# Patient Record
Sex: FEMALE | Race: WHITE | Employment: OTHER | URBAN - METROPOLITAN AREA
[De-identification: names, ages, dates, MRNs, and addresses within clinical notes are randomized per-mention and may not be internally consistent; named-entity substitution may affect disease eponyms.]

---

## 2017-01-03 ENCOUNTER — HOSPITAL ENCOUNTER (INPATIENT)
Facility: HOSPITAL | Age: 67
LOS: 4 days | Discharge: HOME HEALTH | DRG: 191 | End: 2017-01-07
Attending: INTERNAL MEDICINE | Admitting: INTERNAL MEDICINE
Payer: MEDICARE

## 2017-01-03 LAB
BASOPHILS # BLD AUTO: 0.1 X10(3) UL (ref 0–0.1)
BASOPHILS NFR BLD AUTO: 0.9 %
BUN BLD-MCNC: 18 MG/DL (ref 8–20)
CALCIUM BLD-MCNC: 10 MG/DL (ref 8.3–10.3)
CHLORIDE: 100 MMOL/L (ref 101–111)
CO2: 30 MMOL/L (ref 22–32)
CREAT BLD-MCNC: 1.07 MG/DL (ref 0.55–1.02)
EOSINOPHIL # BLD AUTO: 0.14 X10(3) UL (ref 0–0.3)
EOSINOPHIL NFR BLD AUTO: 1.2 %
ERYTHROCYTE [DISTWIDTH] IN BLOOD BY AUTOMATED COUNT: 15.6 % (ref 11.5–16)
GLUCOSE BLD-MCNC: 90 MG/DL (ref 70–99)
HAV IGM SER QL: 1.8 MG/DL (ref 1.7–3)
HCT VFR BLD AUTO: 42.1 % (ref 34–50)
HGB BLD-MCNC: 13 G/DL (ref 12–16)
IMMATURE GRANULOCYTE COUNT: 0.17 X10(3) UL (ref 0–1)
IMMATURE GRANULOCYTE RATIO %: 1.5 %
LYMPHOCYTES # BLD AUTO: 1.84 X10(3) UL (ref 0.9–4)
LYMPHOCYTES NFR BLD AUTO: 15.8 %
MCH RBC QN AUTO: 25.3 PG (ref 27–33.2)
MCHC RBC AUTO-ENTMCNC: 30.9 G/DL (ref 31–37)
MCV RBC AUTO: 82.1 FL (ref 81–100)
MONOCYTES # BLD AUTO: 1.12 X10(3) UL (ref 0.1–0.6)
MONOCYTES NFR BLD AUTO: 9.6 %
NEUTROPHIL ABS PRELIM: 8.29 X10 (3) UL (ref 1.3–6.7)
NEUTROPHILS # BLD AUTO: 8.29 X10(3) UL (ref 1.3–6.7)
NEUTROPHILS NFR BLD AUTO: 71 %
PLATELET # BLD AUTO: 314 10(3)UL (ref 150–450)
POTASSIUM SERPL-SCNC: 4.1 MMOL/L (ref 3.6–5.1)
RBC # BLD AUTO: 5.13 X10(6)UL (ref 3.8–5.1)
RED CELL DISTRIBUTION WIDTH-SD: 46.3 FL (ref 35.1–46.3)
SODIUM SERPL-SCNC: 136 MMOL/L (ref 136–144)
WBC # BLD AUTO: 11.7 X10(3) UL (ref 4–13)

## 2017-01-03 PROCEDURE — 83735 ASSAY OF MAGNESIUM: CPT | Performed by: HOSPITALIST

## 2017-01-03 PROCEDURE — 80048 BASIC METABOLIC PNL TOTAL CA: CPT | Performed by: HOSPITALIST

## 2017-01-03 PROCEDURE — 87633 RESP VIRUS 12-25 TARGETS: CPT | Performed by: HOSPITALIST

## 2017-01-03 PROCEDURE — 94640 AIRWAY INHALATION TREATMENT: CPT

## 2017-01-03 PROCEDURE — 85025 COMPLETE CBC W/AUTO DIFF WBC: CPT | Performed by: HOSPITALIST

## 2017-01-03 PROCEDURE — 87798 DETECT AGENT NOS DNA AMP: CPT | Performed by: HOSPITALIST

## 2017-01-03 PROCEDURE — 87486 CHLMYD PNEUM DNA AMP PROBE: CPT | Performed by: HOSPITALIST

## 2017-01-03 PROCEDURE — 87581 M.PNEUMON DNA AMP PROBE: CPT | Performed by: HOSPITALIST

## 2017-01-03 PROCEDURE — 87999 UNLISTED MICROBIOLOGY PX: CPT

## 2017-01-03 PROCEDURE — 94660 CPAP INITIATION&MGMT: CPT

## 2017-01-03 RX ORDER — GLIMEPIRIDE 4 MG/1
4 TABLET ORAL
COMMUNITY
End: 2017-05-02

## 2017-01-03 RX ORDER — PRAVASTATIN SODIUM 20 MG
20 TABLET ORAL NIGHTLY
Status: DISCONTINUED | OUTPATIENT
Start: 2017-01-03 | End: 2017-01-07

## 2017-01-03 RX ORDER — FUROSEMIDE 40 MG/1
40 TABLET ORAL
Status: DISCONTINUED | OUTPATIENT
Start: 2017-01-03 | End: 2017-01-07

## 2017-01-03 RX ORDER — CYCLOBENZAPRINE HCL 5 MG
5 TABLET ORAL 3 TIMES DAILY
Status: DISCONTINUED | OUTPATIENT
Start: 2017-01-03 | End: 2017-01-07

## 2017-01-03 RX ORDER — FAMOTIDINE 20 MG/1
20 TABLET ORAL DAILY
Status: DISCONTINUED | OUTPATIENT
Start: 2017-01-04 | End: 2017-01-07

## 2017-01-03 RX ORDER — POLYETHYLENE GLYCOL 3350 17 G/17G
17 POWDER, FOR SOLUTION ORAL DAILY PRN
Status: DISCONTINUED | OUTPATIENT
Start: 2017-01-03 | End: 2017-01-07

## 2017-01-03 RX ORDER — ATENOLOL 25 MG/1
25 TABLET ORAL DAILY
Status: DISCONTINUED | OUTPATIENT
Start: 2017-01-04 | End: 2017-01-07

## 2017-01-03 RX ORDER — TRAZODONE HYDROCHLORIDE 50 MG/1
150 TABLET ORAL NIGHTLY
Status: DISCONTINUED | OUTPATIENT
Start: 2017-01-03 | End: 2017-01-07

## 2017-01-03 RX ORDER — ONDANSETRON 2 MG/ML
4 INJECTION INTRAMUSCULAR; INTRAVENOUS EVERY 6 HOURS PRN
Status: DISCONTINUED | OUTPATIENT
Start: 2017-01-03 | End: 2017-01-07

## 2017-01-03 RX ORDER — LISINOPRIL 5 MG/1
5 TABLET ORAL DAILY
COMMUNITY
End: 2017-12-19

## 2017-01-03 RX ORDER — MIRTAZAPINE 15 MG/1
15 TABLET, FILM COATED ORAL NIGHTLY
Status: DISCONTINUED | OUTPATIENT
Start: 2017-01-03 | End: 2017-01-07

## 2017-01-03 RX ORDER — LISINOPRIL 5 MG/1
5 TABLET ORAL DAILY
Status: DISCONTINUED | OUTPATIENT
Start: 2017-01-04 | End: 2017-01-07

## 2017-01-03 RX ORDER — ACETAMINOPHEN 325 MG/1
650 TABLET ORAL EVERY 6 HOURS PRN
Status: DISCONTINUED | OUTPATIENT
Start: 2017-01-03 | End: 2017-01-07

## 2017-01-03 RX ORDER — IPRATROPIUM BROMIDE AND ALBUTEROL SULFATE 2.5; .5 MG/3ML; MG/3ML
3 SOLUTION RESPIRATORY (INHALATION)
Status: DISCONTINUED | OUTPATIENT
Start: 2017-01-03 | End: 2017-01-05

## 2017-01-03 RX ORDER — FLUTICASONE PROPIONATE 50 MCG
2 SPRAY, SUSPENSION (ML) NASAL DAILY
Status: DISCONTINUED | OUTPATIENT
Start: 2017-01-04 | End: 2017-01-07

## 2017-01-03 RX ORDER — IPRATROPIUM BROMIDE 21 UG/1
2 SPRAY, METERED NASAL 3 TIMES DAILY PRN
Status: DISCONTINUED | OUTPATIENT
Start: 2017-01-03 | End: 2017-01-07

## 2017-01-03 RX ORDER — DULOXETIN HYDROCHLORIDE 30 MG/1
60 CAPSULE, DELAYED RELEASE ORAL DAILY
Status: DISCONTINUED | OUTPATIENT
Start: 2017-01-04 | End: 2017-01-07

## 2017-01-04 ENCOUNTER — APPOINTMENT (OUTPATIENT)
Dept: GENERAL RADIOLOGY | Facility: HOSPITAL | Age: 67
DRG: 191 | End: 2017-01-04
Attending: HOSPITALIST
Payer: MEDICARE

## 2017-01-04 LAB
ADENOVIRUS PCR:: NEGATIVE
B PERT DNA SPEC QL NAA+PROBE: NEGATIVE
BASOPHILS # BLD AUTO: 0.11 X10(3) UL (ref 0–0.1)
BASOPHILS NFR BLD AUTO: 1.1 %
BUN BLD-MCNC: 20 MG/DL (ref 8–20)
C PNEUM DNA SPEC QL NAA+PROBE: NEGATIVE
CALCIUM BLD-MCNC: 9.7 MG/DL (ref 8.3–10.3)
CHLORIDE: 100 MMOL/L (ref 101–111)
CO2: 33 MMOL/L (ref 22–32)
CORONAVIRUS 229E PCR:: POSITIVE
CORONAVIRUS HKU1 PCR:: NEGATIVE
CORONAVIRUS NL63 PCR:: NEGATIVE
CORONAVIRUS OC43 PCR:: NEGATIVE
CREAT BLD-MCNC: 0.95 MG/DL (ref 0.55–1.02)
EOSINOPHIL # BLD AUTO: 0.21 X10(3) UL (ref 0–0.3)
EOSINOPHIL NFR BLD AUTO: 2.2 %
ERYTHROCYTE [DISTWIDTH] IN BLOOD BY AUTOMATED COUNT: 15.6 % (ref 11.5–16)
FLUAV H1 2009 PAND RNA SPEC QL NAA+PROBE: NEGATIVE
FLUAV H1 RNA SPEC QL NAA+PROBE: NEGATIVE
FLUAV H3 RNA SPEC QL NAA+PROBE: NEGATIVE
FLUAV RNA SPEC QL NAA+PROBE: NEGATIVE
FLUBV RNA SPEC QL NAA+PROBE: NEGATIVE
GLUCOSE BLD-MCNC: 113 MG/DL (ref 65–99)
GLUCOSE BLD-MCNC: 125 MG/DL (ref 70–99)
GLUCOSE BLD-MCNC: 155 MG/DL (ref 65–99)
GLUCOSE BLD-MCNC: 313 MG/DL (ref 65–99)
HAV IGM SER QL: 2.1 MG/DL (ref 1.7–3)
HCT VFR BLD AUTO: 40.6 % (ref 34–50)
HGB BLD-MCNC: 12.4 G/DL (ref 12–16)
IMMATURE GRANULOCYTE COUNT: 0.2 X10(3) UL (ref 0–1)
IMMATURE GRANULOCYTE RATIO %: 2.1 %
LYMPHOCYTES # BLD AUTO: 1.49 X10(3) UL (ref 0.9–4)
LYMPHOCYTES NFR BLD AUTO: 15.5 %
MCH RBC QN AUTO: 25.3 PG (ref 27–33.2)
MCHC RBC AUTO-ENTMCNC: 30.5 G/DL (ref 31–37)
MCV RBC AUTO: 82.7 FL (ref 81–100)
METAPNEUMOVIRUS PCR:: NEGATIVE
MONOCYTES # BLD AUTO: 0.91 X10(3) UL (ref 0.1–0.6)
MONOCYTES NFR BLD AUTO: 9.4 %
MYCOPLASMA PNEUMONIA PCR:: NEGATIVE
NEUTROPHIL ABS PRELIM: 6.72 X10 (3) UL (ref 1.3–6.7)
NEUTROPHILS # BLD AUTO: 6.72 X10(3) UL (ref 1.3–6.7)
NEUTROPHILS NFR BLD AUTO: 69.7 %
PARAINFLUENZA 1 PCR:: NEGATIVE
PARAINFLUENZA 2 PCR:: NEGATIVE
PARAINFLUENZA 3 PCR:: NEGATIVE
PARAINFLUENZA 4 PCR:: NEGATIVE
PLATELET # BLD AUTO: 284 10(3)UL (ref 150–450)
POTASSIUM SERPL-SCNC: 3.8 MMOL/L (ref 3.6–5.1)
RBC # BLD AUTO: 4.91 X10(6)UL (ref 3.8–5.1)
RED CELL DISTRIBUTION WIDTH-SD: 47.2 FL (ref 35.1–46.3)
RHINOVIRUS/ENTERO PCR:: NEGATIVE
RSV RNA SPEC QL NAA+PROBE: NEGATIVE
SODIUM SERPL-SCNC: 138 MMOL/L (ref 136–144)
WBC # BLD AUTO: 9.6 X10(3) UL (ref 4–13)

## 2017-01-04 PROCEDURE — 85025 COMPLETE CBC W/AUTO DIFF WBC: CPT | Performed by: HOSPITALIST

## 2017-01-04 PROCEDURE — 94640 AIRWAY INHALATION TREATMENT: CPT

## 2017-01-04 PROCEDURE — 83735 ASSAY OF MAGNESIUM: CPT | Performed by: HOSPITALIST

## 2017-01-04 PROCEDURE — 71010 XR CHEST AP PORTABLE  (CPT=71010): CPT

## 2017-01-04 PROCEDURE — 80048 BASIC METABOLIC PNL TOTAL CA: CPT | Performed by: HOSPITALIST

## 2017-01-04 PROCEDURE — 82962 GLUCOSE BLOOD TEST: CPT

## 2017-01-04 RX ORDER — ENOXAPARIN SODIUM 100 MG/ML
0.5 INJECTION SUBCUTANEOUS DAILY
Status: DISCONTINUED | OUTPATIENT
Start: 2017-01-04 | End: 2017-01-07

## 2017-01-04 RX ORDER — DEXTROSE MONOHYDRATE 25 G/50ML
50 INJECTION, SOLUTION INTRAVENOUS
Status: DISCONTINUED | OUTPATIENT
Start: 2017-01-04 | End: 2017-01-07

## 2017-01-04 RX ORDER — METHYLPREDNISOLONE SODIUM SUCCINATE 40 MG/ML
40 INJECTION, POWDER, LYOPHILIZED, FOR SOLUTION INTRAMUSCULAR; INTRAVENOUS EVERY 8 HOURS
Status: COMPLETED | OUTPATIENT
Start: 2017-01-04 | End: 2017-01-05

## 2017-01-04 NOTE — PROGRESS NOTES
[facility] Progress Note:  Anticoagulation Weight Dose Adjustment for enoxaparin ([place] Perfect)    Saima Tinoco is a 77year old female who has been prescribed enoxaparin (LOVENOX) for VTE prophylaxis.       Estimated Creatinine Clearance: 48.2 mL/min (b

## 2017-01-04 NOTE — PAYOR COMM NOTE
Attending Physician: Alphonso Crook DO    Review Type: ADMISSION   Reviewer: Be HOLLINGSWORTH       Date: January 4, 2017 - 2:09 PM  Payor: Tracey Edwards MEDICARE ADV HMO  Authorization Number: [id number]/A  Admit date: 1/3/2017  5:51 PM   Admitted from Emergency Dept. Ronan Peterson Given 3 mL Nebulization [place], [place]    1/4/2017 8885 Given 3 mL Nebulization [place]    1/3/2017 2000 Given 3 mL Nebulization Desire Mclean          RESULTS LAST 24HRS:  Labs Reviewed   BASIC METABOLIC PANEL (8) - Abnormal; Notable for the fo    3. Mo:   - continue pap therapy  4. Dispo: full code. Potential d/c in 1-2 days.

## 2017-01-04 NOTE — RESPIRATORY THERAPY NOTE
ASHLEY Equipment Usage Summary :            Set Mode :AFLEX            Usage in Hours:6;49            90% Pressure (EPAP) : 6.5             90% Insp Pressure (IPAP):            AHI : 4.8            Supplemental Oxygen :   3     LPM

## 2017-01-04 NOTE — PLAN OF CARE
RESPIRATORY - ADULT    • Achieves optimal ventilation and oxygenation Progressing        Pt is alert and oriented, lung sounds are wheezy, pt is positive for corona virus from today, will inform the Dr. She remains on con and droplet isolation.  Slight scarlet

## 2017-01-04 NOTE — PLAN OF CARE
Patient/Family Goals    • Patient/Family Long Term Goal Progressing    • Patient/Family Short Term Goal Progressing        RESPIRATORY - ADULT    • Achieves optimal ventilation and oxygenation Progressing          Patient is alert and oriented.   No complai

## 2017-01-04 NOTE — CONSULTS
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 SPI DX/THER Lucina Almonte  4/22/2015    Comment Procedure: ;  Surgeon: Trupti Kitchen MD;  Location: [address] [place] [address] Grandfather      pancreatic   • Other[other] [OTHER] Mother      Sarcoid   • Hypertension Mother    • Lipids Mother    • Obesity Mother    • Psychiatric Mother         Home Medications:    Outpatient Prescriptions Marked as Taking for the 1/3/17 encounter Take 500 mg by mouth 2 (two) times daily with meals. Disp:  Rfl:    Tiotropium Bromide Monohydrate 18 MCG Inhalation Cap Inhale 1 capsule (18 mcg total) into the lungs daily.  Disp: 90 capsule Rfl: 3   Ketoconazole-Hydrocortisone 2 & 1 % (CREAM) External Ki 01/04/17  0040 01/04/17  0500 01/04/17  0843 01/04/17  1334   BP: 130/58 150/75 136/66 110/61   Pulse: 60 67 81 73   Temp: 98 °F (36.7 °C) 98.2 °F (36.8 °C) 97.5 °F (36.4 °C) 97.9 °F (36.6 °C)   TempSrc: Axillary Oral Oral Oral   Resp: 16 16 18 18   Weight (mg/dL)   Date Value   08/14/2015 0.88   01/09/2014 1.14*   09/04/2013 0.89   ----------  CREATININE (mg/dL)   Date Value   01/04/2017 0.95   01/03/2017 1.07*   11/03/2016 0.70   08/12/2016 0.86   02/02/2016 1.00   06/24/2015 0.91   ----------]    No resul

## 2017-01-04 NOTE — H&P
[facility] Hospitalist History and Physical      No chief complaint on file.        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 obese   Head:  Normocephalic, without obvious abnormality, atraumatic. Eyes:  Sclera anicteric, No conjunctival pallor, EOMs intact.     Nose: Nares normal,  Mucosa normal    Throat: Lips, mucosa, and tongue normal Assessment/Plan:     Patient is a 77year old female with PMH sig for HTN, obesity, HL, GERD, depression/anxiety, ASHLEY, copd on chronic O2 who presents to PCP office with SOB.     #COPD exacerbation - due to coronavirus  -chest xr reviewed, no

## 2017-01-05 LAB
GLUCOSE BLD-MCNC: 160 MG/DL (ref 65–99)
GLUCOSE BLD-MCNC: 173 MG/DL (ref 65–99)
GLUCOSE BLD-MCNC: 178 MG/DL (ref 65–99)
GLUCOSE BLD-MCNC: 268 MG/DL (ref 65–99)

## 2017-01-05 PROCEDURE — 82962 GLUCOSE BLOOD TEST: CPT

## 2017-01-05 PROCEDURE — 94640 AIRWAY INHALATION TREATMENT: CPT

## 2017-01-05 PROCEDURE — 94664 DEMO&/EVAL PT USE INHALER: CPT

## 2017-01-05 RX ORDER — GUAIFENESIN 600 MG
600 TABLET, EXTENDED RELEASE 12 HR ORAL 2 TIMES DAILY
Status: DISCONTINUED | OUTPATIENT
Start: 2017-01-05 | End: 2017-01-06

## 2017-01-05 RX ORDER — IPRATROPIUM BROMIDE AND ALBUTEROL SULFATE 2.5; .5 MG/3ML; MG/3ML
3 SOLUTION RESPIRATORY (INHALATION)
Status: DISCONTINUED | OUTPATIENT
Start: 2017-01-05 | End: 2017-01-07

## 2017-01-05 RX ORDER — PREDNISONE 20 MG/1
40 TABLET ORAL
Status: DISCONTINUED | OUTPATIENT
Start: 2017-01-06 | End: 2017-01-07

## 2017-01-05 RX ORDER — FUROSEMIDE 10 MG/ML
40 INJECTION INTRAMUSCULAR; INTRAVENOUS ONCE
Status: COMPLETED | OUTPATIENT
Start: 2017-01-05 | End: 2017-01-05

## 2017-01-05 NOTE — CM/SW NOTE
01/05/17 1600   CM/SW Referral Data   Referral Source    Reason for Referral Discharge planning   Informant Patient   Pertinent Medical Hx   Primary Care Physician Name [facility]  (Mason IV)   Patient Info   Patient's Mental Status Alert;O issues resolved through [employer] customer service. CM will obtain new order for CPAP mask with nasal pillows, head and chin straps. CM will ask the [place] liaison to follow up with pt. CM contact info left with pt.   Will follow up in am.    Francisco Javier Maravilla

## 2017-01-05 NOTE — PROGRESS NOTES
[facility] Hospitalist Progress Note                                                                   Prabhu 45  3/7/1950    SUBJECTIVE: pt is doing better. Still coughing, but less sob. spray Nasal Daily   • furosemide  40 mg Oral BID (Diuretic)   • Levothyroxine Sodium  137 mcg Oral Before breakfast   • lisinopril  5 mg Oral Daily   • Cyclobenzaprine HCl  5 mg Oral TID   • mirtazapine  15 mg Oral Nightly   • famoTIDine  20 mg Oral Daily

## 2017-01-05 NOTE — RESPIRATORY THERAPY NOTE
ASHLEY Equipment Usage Summary :            Set Mode :AFLEX            Usage in Hours:1;58            90% Pressure (EPAP) :6.5              90% Insp Pressure (IPAP):            AHI : 4            Supplemental Oxygen :    3    LPM

## 2017-01-05 NOTE — PROGRESS NOTES
Pulmonary Progress Note        NAME: Gladis Hess - ROOM: 546/817-W - MRN: [id number] - Age: 77year old - : 3/7/1950        SUBJECTIVE: No events overnight, still feels exhausted, less wheezing    OBJECTIVE:   17  0445 17  0720  01/04/17  0651   WBC 11.7 9.6   HGB 13.0 12.4   MCV 82.1 82.7   .0 284.0         Recent Labs   01/03/17 1917 01/04/17  0651    138   K 4.1 3.8   * 100*   CO2 30.0 33.0*   BUN 18 20   CA 10.0 9.7   MG 1.8 2.1       No results for input(s outpatient  -needs cost effective options for d/c  3. Viral bronchitis: RVP with coronavirus  - hold on abx  - pulm toilet.    4. Mo:   - continue pap therapy  5. Dispo: full code. Potential d/c tomorrow if pt feels better.       [address] [facility]

## 2017-01-05 NOTE — PLAN OF CARE
Diabetes/Glucose Control    • Glucose maintained within prescribed range Progressing        Patient/Family Goals    • Patient/Family Long Term Goal Progressing    • Patient/Family Short Term Goal Progressing        RESPIRATORY - ADULT    • Achieves optimal 3 L NC, 2 L NC prn baseline at home; droplet precautions maintained, + for coronavirus; on tele, NSR; up self to washroom with steady gait; receiving IV solumedrol, will switch to PO prednisone tomorrow AM, possible d/c tomorrow if feeling better per MD; p

## 2017-01-06 PROBLEM — Z99.89 OSA ON CPAP: Status: ACTIVE | Noted: 2017-01-06

## 2017-01-06 PROBLEM — G47.33 OSA ON CPAP: Status: ACTIVE | Noted: 2017-01-06

## 2017-01-06 LAB
GLUCOSE BLD-MCNC: 157 MG/DL (ref 65–99)
GLUCOSE BLD-MCNC: 175 MG/DL (ref 65–99)
GLUCOSE BLD-MCNC: 189 MG/DL (ref 65–99)
GLUCOSE BLD-MCNC: 221 MG/DL (ref 65–99)

## 2017-01-06 PROCEDURE — 82962 GLUCOSE BLOOD TEST: CPT

## 2017-01-06 PROCEDURE — 94664 DEMO&/EVAL PT USE INHALER: CPT

## 2017-01-06 PROCEDURE — 94640 AIRWAY INHALATION TREATMENT: CPT

## 2017-01-06 RX ORDER — SENNOSIDES 8.6 MG
8.6 TABLET ORAL 2 TIMES DAILY PRN
Status: DISCONTINUED | OUTPATIENT
Start: 2017-01-06 | End: 2017-01-07

## 2017-01-06 RX ORDER — DOCUSATE SODIUM 100 MG/1
100 CAPSULE, LIQUID FILLED ORAL 2 TIMES DAILY PRN
Status: DISCONTINUED | OUTPATIENT
Start: 2017-01-06 | End: 2017-01-07

## 2017-01-06 NOTE — PROGRESS NOTES
[facility] Hospitalist Progress Note                                                                   Prabhu 45  3/7/1950    Cc: f/u dyspnea    SUBJECTIVE: pt is doing better.  Complains a Daily   • Fluticasone Propionate  2 spray Nasal Daily   • furosemide  40 mg Oral BID (Diuretic)   • Levothyroxine Sodium  137 mcg Oral Before breakfast   • lisinopril  5 mg Oral Daily   • Cyclobenzaprine HCl  5 mg Oral TID   • mirtazapine  15 mg Oral Night

## 2017-01-06 NOTE — PROGRESS NOTES
Assumed care of pt at 2230, see flowsheets. Pt is AOX4. Pt c/o neck pain, PRN tylenol adminisitered with hot pack, with relief, wctm. Pt maintaining O2 saturation >92% on 3L nasal cannula, pt refusing CPAP, . Pt on tele, NSR. Pt up ad ban, BRP.  PIV sali

## 2017-01-06 NOTE — PROGRESS NOTES
Pulmonary Progress Note        NAME: Paige Boxer - ROOM: 695/517-U - MRN: [id number] - Age: 77year old - : 3/7/1950        SUBJECTIVE: No events overnight, doesn't like the mucinex    OBJECTIVE:   17  0029 17  0515 17  0700  WBC 11.7 9.6   HGB 13.0 12.4   MCV 82.1 82.7   .0 284.0         Recent Labs   01/03/17  1917 01/04/17  0651    138   K 4.1 3.8   * 100*   CO2 30.0 33.0*   BUN 18 20   CA 10.0 9.7   MG 1.8 2.1       No results for input(s): ALT, AST, AL and monitor  -needs cost effective options for d/c  3. Viral bronchitis: RVP with coronavirus  - hold on abx  - pulm toilet.    4. Mo:   - continue pap therapy  5.  Dispo: full code.  -plan to d/c tomorrow barring clinical worsening      Tamara WHITT

## 2017-01-06 NOTE — RESPIRATORY THERAPY NOTE
Set Mode :                          Usage in Hours:                           90% Exp. Pressure(EPAP):                          90% Insp. Pressure(IPAP):                          AHI :                          Supplement

## 2017-01-06 NOTE — PAYOR COMM NOTE
Attending Physician: Cynthia Friend MD    Review Type: CONTINUED STAY  Reviewer: Dm HOLLINGSWORTH     Date: January 6, 2017 - 12:24 PM  Pay[facility]  Authorization Number: [id number]  Admit date: 1/3/2017  5:51 PM        REVIEWER COMMENTS Dose Route User    1/6/2017 0700 Given 3 mL Nebulization Cristina Weeks RCP    1/5/2017 1908 Given 3 mL Nebulization Katrin Nila    1/5/2017 1310 Given 3 mL Nebulization Mi Patel, SYLVIA      lisinopril (PRINIVIL,ZESTRIL) tab 5 mg     Date Action Dose R

## 2017-01-06 NOTE — PROGRESS NOTES
Multidisciplinary Discharge Rounds held 1/6/2017. Treatment team members present today include , , Charge Nurse,  Nurse, RT, PT and Pharmacy caring for [employer].      Other care providers present:    Patient Active Prob

## 2017-01-06 NOTE — PROGRESS NOTES
Sp02 percent ambulation on room air. ..  85%    Sp02 percent ambulation on 02 94% on 2 liters per minute

## 2017-01-06 NOTE — CM/SW NOTE
Order for new CPAP mask and supplies along with pt request for larger portable O2 tanks sent to [address] [place] [address]. Spoke to their liaison, Charli Pollock, who will follow up and request tech be sent to the home. Liaison contact number given to pt.   Pt states [address] [place] [address] already c

## 2017-01-07 VITALS
HEART RATE: 65 BPM | TEMPERATURE: 98 F | DIASTOLIC BLOOD PRESSURE: 70 MMHG | SYSTOLIC BLOOD PRESSURE: 134 MMHG | OXYGEN SATURATION: 97 % | RESPIRATION RATE: 18 BRPM | BODY MASS INDEX: 52 KG/M2 | WEIGHT: 292.13 LBS

## 2017-01-07 LAB
GLUCOSE BLD-MCNC: 163 MG/DL (ref 65–99)
GLUCOSE BLD-MCNC: 99 MG/DL (ref 65–99)

## 2017-01-07 PROCEDURE — 82962 GLUCOSE BLOOD TEST: CPT

## 2017-01-07 PROCEDURE — 94640 AIRWAY INHALATION TREATMENT: CPT

## 2017-01-07 RX ORDER — PREDNISONE 20 MG/1
40 TABLET ORAL
Qty: 6 TABLET | Refills: 0 | Status: SHIPPED | OUTPATIENT
Start: 2017-01-08 | End: 2017-01-07

## 2017-01-07 RX ORDER — PREDNISONE 10 MG/1
TABLET ORAL
Qty: 26 TABLET | Refills: 0 | Status: ON HOLD | OUTPATIENT
Start: 2017-01-07 | End: 2017-06-27

## 2017-01-07 RX ORDER — BENZONATATE 100 MG/1
100 CAPSULE ORAL 3 TIMES DAILY PRN
Qty: 60 CAPSULE | Refills: 0 | Status: SHIPPED | OUTPATIENT
Start: 2017-01-07 | End: 2017-07-12 | Stop reason: ALTCHOICE

## 2017-01-07 NOTE — PROGRESS NOTES
Pulmonary Progress Note        NAME: Braulio Mccloud - ROOM: [address] [place] [address] - MRN: [id number] - Age: 77year old - : 3/7/1950        SUBJECTIVE: No events overnight    OBJECTIVE:   17  1946 17  0012 17  0424 17  0848   BP: 127/58 11 .0 284.0         Recent Labs   01/03/17 1917 01/04/17  0651    138   K 4.1 3.8   * 100*   CO2 30.0 33.0*   BUN 18 20   CA 10.0 9.7   MG 1.8 2.1       No results for input(s): ALT, AST, ALB, AMYLASE, LIPASE, LDH in the last 72 hours. d/c  3. Viral bronchitis: RVP with coronavirus  - hold on abx  - pulm toilet.    4. Mo:   - continue pap therapy  5. Dispo: full code.   -ok for home  -f/u in clinic in the next week or two      [address] [facility] Pulmonary and [address]

## 2017-01-07 NOTE — PLAN OF CARE
Diabetes/Glucose Control    • Glucose maintained within prescribed range Adequate for Discharge        Patient/Family Goals    • Patient/Family Long Term Goal Adequate for Discharge    • Patient/Family Short Term Goal Adequate for Discharge        RESPIRAT

## 2017-01-07 NOTE — RESPIRATORY THERAPY NOTE
ASHLEY Equipment Usage Summary :            Set Mode :AFLEX            Usage in Hours:1;23            90% Pressure (EPAP) : 6.1             90% Insp Pressure (IPAP):            AHI : 6.5            Supplemental Oxygen :    4    LPM

## 2017-01-07 NOTE — CM/SW NOTE
sw notified that Jacob Lint is too expensive for patient. sw looked into mfr assistance programs and there is one program however pt is not eligible since she is a medicare recipient.  anuj notified RN who will discuss with MD.     [address] [facility]

## 2017-01-07 NOTE — PLAN OF CARE
Diabetes/Glucose Control    • Glucose maintained within prescribed range Progressing        Patient/Family Goals    • Patient/Family Long Term Goal Progressing    • Patient/Family Short Term Goal Progressing        Pt is A&Ox4. 2-4L O2. WALKER. Cough.  Refuses

## 2017-01-09 NOTE — CM/SW NOTE
Patient discharged on 01/07/2017 as previously planned.        01/09/17 0900   Discharge disposition   Discharged to: Home-Health   Name of Lee Ann ProcDutch Thakkar 1 services after discharge DME   E provider [facility]   Discharge transpor

## 2017-01-09 NOTE — DISCHARGE SUMMARY
General Medicine Discharge Summary     Patient ID:  Gladis Hess  77year old  3/7/1950    Admit date: 1/3/2017    Discharge date and time: 1/7/2017  5:25 PM     Attending Physician: Leonides Posadas MD office with SOB. #COPD exacerbation, acute on chronic- due to coronavirus  -chest xr reviewed, no evidence of PNA  -cont Nebs, inhalers.  Transition to PO steroids  -Pulm consulted, appreciate  -Wean o2 as able, chronically on oxygen    #HTN- cont bp med arrives    DULoxetine HCl 30 MG Oral Cap DR Particles  Take 2 capsules (60 mg total) by mouth daily. , Normal, Disp-90 capsule, R-3    Fluticasone Furoate-Vilanterol 100-25 MCG/INH Inhalation Aerosol Powder, Breath Activated  Inhale 1 puff into the lungs da 01/07/17  0424   BP: 134/70   Pulse: 65   Temp: 97.9 °F (36.6 °C)   Resp: 18       General: no acute distress, alert and oriented x 3  Heart: RRR  Lungs: clear bilaterally, no active wheezing  Abdomen: nontender, nondistended, intact BS  Extremities: +1 ed

## 2017-01-27 ENCOUNTER — HOSPITAL ENCOUNTER (OUTPATIENT)
Dept: GENERAL RADIOLOGY | Facility: HOSPITAL | Age: 67
Discharge: HOME | End: 2017-01-27
Attending: OTOLARYNGOLOGY
Payer: MEDICARE

## 2017-01-27 DIAGNOSIS — R13.10 DYSPHAGIA: ICD-10-CM

## 2017-01-27 PROCEDURE — 74230 X-RAY XM SWLNG FUNCJ C+: CPT

## 2017-01-27 PROCEDURE — 92611 MOTION FLUOROSCOPY/SWALLOW: CPT

## 2017-01-27 NOTE — PROGRESS NOTES
ADULT VIDEOFLUOROSCOPIC SWALLOWING STUDY    Admission Date: 1/27/2017  Evaluation Date: 01/27/2017  Radiologist: Adenike Lay    Dear Erasmo Vega,  This letter is to inform you of Aye Núñez Videofluoroscopic Swallowing Study results and/or plan o 51 RDI 53 SaO2 ko 79 % BIPAP 16/11 with O2 entrained @ 2 l/min   • COPD (chronic obstructive pulmonary disease) (HCC)      02-2L nc prn at home   • Sleep apnea                       Imaging results:        CONCLUSION:      Low lung volumes.  Mild increas aspiration of consistencies / thin, nectar, honey, puree or solid. GOALS  Goal #1 The patient will tolerate regular consistency and thin liquids without overt signs or symptoms of aspiration with 95 % accuracy over 0 session(s).    Goal #2 The patient/fa

## 2017-02-02 PROCEDURE — 84443 ASSAY THYROID STIM HORMONE: CPT | Performed by: INTERNAL MEDICINE

## 2017-02-02 PROCEDURE — 86800 THYROGLOBULIN ANTIBODY: CPT | Performed by: INTERNAL MEDICINE

## 2017-06-24 NOTE — LETTER
[facility] [address], [address]  Consent for Procedure/Sedation    Date: 10/27/2020   Time: 0817      1.  I authorize the performance upon Nicole Tuttle the following:cardiac catheterization, left ventricular cineangiography Signature of person authorized                                     Relationship to  to consent for patient: _________________________ patient: ___________________    Witness: _______________________________ Date: _____________________    Printed: 10/27/202 No

## 2017-06-27 ENCOUNTER — HOSPITAL ENCOUNTER (OUTPATIENT)
Facility: HOSPITAL | Age: 67
Setting detail: OBSERVATION
Discharge: HOME | End: 2017-06-29
Attending: EMERGENCY MEDICINE | Admitting: INTERNAL MEDICINE
Payer: MEDICARE

## 2017-06-27 ENCOUNTER — APPOINTMENT (OUTPATIENT)
Dept: GENERAL RADIOLOGY | Facility: HOSPITAL | Age: 67
End: 2017-06-27
Attending: EMERGENCY MEDICINE
Payer: MEDICARE

## 2017-06-27 ENCOUNTER — APPOINTMENT (OUTPATIENT)
Dept: NUCLEAR MEDICINE | Facility: HOSPITAL | Age: 67
End: 2017-06-27
Attending: EMERGENCY MEDICINE
Payer: MEDICARE

## 2017-06-27 DIAGNOSIS — R07.9 ACUTE CHEST PAIN: Primary | ICD-10-CM

## 2017-06-27 LAB
ALBUMIN SERPL-MCNC: 2.9 G/DL (ref 3.5–4.8)
ALP LIVER SERPL-CCNC: 128 U/L (ref 55–142)
ALT SERPL-CCNC: 17 U/L (ref 14–54)
APTT PPP: 35.1 SECONDS (ref 25–34)
AST SERPL-CCNC: 18 U/L (ref 15–41)
BASOPHILS # BLD AUTO: 0.07 X10(3) UL (ref 0–0.1)
BASOPHILS NFR BLD AUTO: 0.6 %
BILIRUB SERPL-MCNC: 0.4 MG/DL (ref 0.1–2)
BUN BLD-MCNC: 11 MG/DL (ref 8–20)
CALCIUM BLD-MCNC: 9.8 MG/DL (ref 8.3–10.3)
CHLORIDE: 101 MMOL/L (ref 101–111)
CO2: 34 MMOL/L (ref 22–32)
CREAT BLD-MCNC: 0.9 MG/DL (ref 0.55–1.02)
D-DIMER: 1.52 UG/ML FEU (ref 0–0.49)
EOSINOPHIL # BLD AUTO: 0.15 X10(3) UL (ref 0–0.3)
EOSINOPHIL NFR BLD AUTO: 1.3 %
ERYTHROCYTE [DISTWIDTH] IN BLOOD BY AUTOMATED COUNT: 17 % (ref 11.5–16)
EST. AVERAGE GLUCOSE BLD GHB EST-MCNC: 166 MG/DL (ref 68–126)
GLUCOSE BLD-MCNC: 160 MG/DL (ref 65–99)
GLUCOSE BLD-MCNC: 160 MG/DL (ref 70–99)
HBA1C MFR BLD HPLC: 7.4 % (ref ?–5.7)
HCT VFR BLD AUTO: 40 % (ref 34–50)
HGB BLD-MCNC: 11.6 G/DL (ref 12–16)
IMMATURE GRANULOCYTE COUNT: 0.23 X10(3) UL (ref 0–1)
IMMATURE GRANULOCYTE RATIO %: 2 %
INR BLD: 1.03 (ref 0.89–1.11)
LYMPHOCYTES # BLD AUTO: 1.49 X10(3) UL (ref 0.9–4)
LYMPHOCYTES NFR BLD AUTO: 13 %
M PROTEIN MFR SERPL ELPH: 8 G/DL (ref 6.1–8.3)
MCH RBC QN AUTO: 23.4 PG (ref 27–33.2)
MCHC RBC AUTO-ENTMCNC: 29 G/DL (ref 31–37)
MCV RBC AUTO: 80.8 FL (ref 81–100)
MONOCYTES # BLD AUTO: 1.11 X10(3) UL (ref 0.1–0.6)
MONOCYTES NFR BLD AUTO: 9.7 %
NEUTROPHIL ABS PRELIM: 8.4 X10 (3) UL (ref 1.3–6.7)
NEUTROPHILS # BLD AUTO: 8.4 X10(3) UL (ref 1.3–6.7)
NEUTROPHILS NFR BLD AUTO: 73.4 %
PLATELET # BLD AUTO: 342 10(3)UL (ref 150–450)
POTASSIUM SERPL-SCNC: 4 MMOL/L (ref 3.6–5.1)
PROCALCITONIN SERPL-MCNC: <0.11 NG/ML (ref ?–0.11)
PSA SERPL DL<=0.01 NG/ML-MCNC: 13.5 SECONDS (ref 12–14.3)
RBC # BLD AUTO: 4.95 X10(6)UL (ref 3.8–5.1)
RED CELL DISTRIBUTION WIDTH-SD: 49.3 FL (ref 35.1–46.3)
SODIUM SERPL-SCNC: 138 MMOL/L (ref 136–144)
TROPONIN: <0.046 NG/ML (ref ?–0.05)
TROPONIN: <0.046 NG/ML (ref ?–0.05)
TSI SER-ACNC: 3.65 MIU/ML (ref 0.35–5.5)
WBC # BLD AUTO: 11.5 X10(3) UL (ref 4–13)

## 2017-06-27 PROCEDURE — 84484 ASSAY OF TROPONIN QUANT: CPT | Performed by: HOSPITALIST

## 2017-06-27 PROCEDURE — 80053 COMPREHEN METABOLIC PANEL: CPT | Performed by: EMERGENCY MEDICINE

## 2017-06-27 PROCEDURE — 36415 COLL VENOUS BLD VENIPUNCTURE: CPT

## 2017-06-27 PROCEDURE — 85610 PROTHROMBIN TIME: CPT | Performed by: EMERGENCY MEDICINE

## 2017-06-27 PROCEDURE — 85378 FIBRIN DEGRADE SEMIQUANT: CPT | Performed by: EMERGENCY MEDICINE

## 2017-06-27 PROCEDURE — 82962 GLUCOSE BLOOD TEST: CPT

## 2017-06-27 PROCEDURE — 84484 ASSAY OF TROPONIN QUANT: CPT | Performed by: EMERGENCY MEDICINE

## 2017-06-27 PROCEDURE — 84145 PROCALCITONIN (PCT): CPT | Performed by: HOSPITALIST

## 2017-06-27 PROCEDURE — 83036 HEMOGLOBIN GLYCOSYLATED A1C: CPT | Performed by: HOSPITALIST

## 2017-06-27 PROCEDURE — 84443 ASSAY THYROID STIM HORMONE: CPT | Performed by: HOSPITALIST

## 2017-06-27 PROCEDURE — 94640 AIRWAY INHALATION TREATMENT: CPT

## 2017-06-27 PROCEDURE — 71010 XR CHEST AP PORTABLE  (CPT=71010): CPT | Performed by: EMERGENCY MEDICINE

## 2017-06-27 PROCEDURE — 85025 COMPLETE CBC W/AUTO DIFF WBC: CPT | Performed by: EMERGENCY MEDICINE

## 2017-06-27 PROCEDURE — 99285 EMERGENCY DEPT VISIT HI MDM: CPT

## 2017-06-27 PROCEDURE — 93005 ELECTROCARDIOGRAM TRACING: CPT

## 2017-06-27 PROCEDURE — 78582 LUNG VENTILAT&PERFUS IMAGING: CPT | Performed by: EMERGENCY MEDICINE

## 2017-06-27 PROCEDURE — 85730 THROMBOPLASTIN TIME PARTIAL: CPT | Performed by: EMERGENCY MEDICINE

## 2017-06-27 PROCEDURE — 93010 ELECTROCARDIOGRAM REPORT: CPT

## 2017-06-27 RX ORDER — TRAZODONE HYDROCHLORIDE 50 MG/1
50 TABLET ORAL NIGHTLY
Status: DISCONTINUED | OUTPATIENT
Start: 2017-06-27 | End: 2017-06-29

## 2017-06-27 RX ORDER — DEXTROSE MONOHYDRATE 25 G/50ML
50 INJECTION, SOLUTION INTRAVENOUS
Status: DISCONTINUED | OUTPATIENT
Start: 2017-06-27 | End: 2017-06-29

## 2017-06-27 RX ORDER — LISINOPRIL 5 MG/1
5 TABLET ORAL DAILY
Status: DISCONTINUED | OUTPATIENT
Start: 2017-06-28 | End: 2017-06-29

## 2017-06-27 RX ORDER — PANTOPRAZOLE SODIUM 20 MG/1
20 TABLET, DELAYED RELEASE ORAL
Status: DISCONTINUED | OUTPATIENT
Start: 2017-06-28 | End: 2017-06-29

## 2017-06-27 RX ORDER — SODIUM CHLORIDE 9 MG/ML
INJECTION, SOLUTION INTRAVENOUS CONTINUOUS
Status: ACTIVE | OUTPATIENT
Start: 2017-06-27 | End: 2017-06-27

## 2017-06-27 RX ORDER — ALBUTEROL SULFATE 90 UG/1
2 AEROSOL, METERED RESPIRATORY (INHALATION) EVERY 4 HOURS PRN
Status: DISCONTINUED | OUTPATIENT
Start: 2017-06-27 | End: 2017-06-29

## 2017-06-27 RX ORDER — MIRTAZAPINE 15 MG/1
15 TABLET, FILM COATED ORAL NIGHTLY
Status: DISCONTINUED | OUTPATIENT
Start: 2017-06-27 | End: 2017-06-29

## 2017-06-27 RX ORDER — DULOXETIN HYDROCHLORIDE 30 MG/1
60 CAPSULE, DELAYED RELEASE ORAL DAILY
Status: DISCONTINUED | OUTPATIENT
Start: 2017-06-28 | End: 2017-06-29

## 2017-06-27 RX ORDER — ATENOLOL 50 MG/1
50 TABLET ORAL DAILY
Status: DISCONTINUED | OUTPATIENT
Start: 2017-06-28 | End: 2017-06-29

## 2017-06-27 RX ORDER — ASPIRIN 81 MG/1
324 TABLET, CHEWABLE ORAL ONCE
Status: COMPLETED | OUTPATIENT
Start: 2017-06-27 | End: 2017-06-27

## 2017-06-27 RX ORDER — IPRATROPIUM BROMIDE 21 UG/1
2 SPRAY, METERED NASAL 3 TIMES DAILY PRN
Status: DISCONTINUED | OUTPATIENT
Start: 2017-06-27 | End: 2017-06-29

## 2017-06-27 RX ORDER — FLUTICASONE PROPIONATE 50 MCG
2 SPRAY, SUSPENSION (ML) NASAL DAILY
Status: DISCONTINUED | OUTPATIENT
Start: 2017-06-28 | End: 2017-06-29

## 2017-06-27 RX ORDER — IPRATROPIUM BROMIDE AND ALBUTEROL SULFATE 2.5; .5 MG/3ML; MG/3ML
3 SOLUTION RESPIRATORY (INHALATION)
Status: DISCONTINUED | OUTPATIENT
Start: 2017-06-27 | End: 2017-06-29

## 2017-06-27 RX ORDER — PRAVASTATIN SODIUM 20 MG
20 TABLET ORAL NIGHTLY
Status: DISCONTINUED | OUTPATIENT
Start: 2017-06-27 | End: 2017-06-29

## 2017-06-27 RX ORDER — ONDANSETRON 2 MG/ML
4 INJECTION INTRAMUSCULAR; INTRAVENOUS EVERY 4 HOURS PRN
Status: DISCONTINUED | OUTPATIENT
Start: 2017-06-27 | End: 2017-06-29

## 2017-06-27 RX ORDER — CLONAZEPAM 0.5 MG/1
2 TABLET ORAL AS NEEDED
Status: DISCONTINUED | OUTPATIENT
Start: 2017-06-27 | End: 2017-06-28

## 2017-06-27 NOTE — ED PROVIDER NOTES
Patient Seen in: [facility] Emergency Department    History   Patient presents with:  Chest Pain Angina (cardiovascular)    Stated Complaint: cp     HPI    The patient is a 78-year-old female who presents emergency room with a history of chest disco • Problems with swallowing     Due to nodules on thyroid   • Reflux    • Sleep apnea    • Status post thyroidectomy    • Thyroid ca [facility]) 6/27/2013   • Thyroid cancer ([facility] [address]     localized, s/p resection       Past Surgical History:  No date: APPENDECTOMY MANAGEMENT  4/22/2015: M-SEDJOVANNI BY PRABHJOT PHYS PERFANGELG SV 5+ YR      Comment: Procedure: ;  Surgeon: Valentino Nugent MD;                 Location: [address] [place] [address] MANAGEMENT  2008: OTHER      Comment: removed benign tumor lt  abt 1986: OTHER SURGICAL HISTO INCRUSE ELLIPTA 62.5 MCG/INH Inhalation Aerosol Powder, Breath Activated,  INHALE 1 PUFF BY MOUTH DAILY   TraZODone HCl 50 MG Oral Tab,  Take 1 tablet (50 mg total) by mouth nightly.    Fluticasone Propionate 50 MCG/ACT Nasal Suspension,  2 sprays by Nasal total) into the lungs daily. Fluticasone Furoate-Vilanterol (BREO ELLIPTA) 200-25 MCG/INH Inhalation Aerosol Powder, Breath Activated,  Inhale 1 puff into the lungs daily.    Tiotropium Bromide Monohydrate (SPIRIVA RESPIMAT) 1.25 MCG/ACT Inhalation Aero S appearance. HEENT: Head is normocephalic, atraumatic. Pupils are 3 mm equally round and reactive to light. Oropharynx is clear. Mucous membranes are moist.  NECK: There is no focal tenderness to palpation appreciated. There is no JVD.  No meningeal signs o results are used as part of the total clinical evaluation of the patient.    CBC W/ DIFFERENTIAL - Abnormal; Notable for the following:     HGB 11.6 (*)     MCV 80.8 (*)     MCH 23.4 (*)     MCHC 29.0 (*)     RDW 17.0 (*)     RDW-SD 49.3 (*)     Neutrophil and cardiac monitor. Patient will be omitted to the hospital in light of multiple risk factors for heart disease including high blood pressure high cholesterol and history of previous tobacco use.   Patient's case discussed with Dr. Rahat Mcmullen, and the pa

## 2017-06-27 NOTE — PLAN OF CARE
1800-received from ED. Aa/ox4. Breathing unlabored at rest, WALKER. On oxygen at home, 2.5L. Denies pain. Routine admission completed.

## 2017-06-27 NOTE — PLAN OF CARE
NURSING ADMISSION NOTE      Patient admitted via Cart  Oriented to room. Safety precautions initiated. Bed in low position. Call light in reach.     Admit Navigators completed  Report given to Alberta Romero

## 2017-06-27 NOTE — ED INITIAL ASSESSMENT (HPI)
Pt was was woken up by left sided chest pian, under left breast. Pt states pain started at 0100 last night

## 2017-06-28 LAB
ATRIAL RATE: 81 BPM
GLUCOSE BLD-MCNC: 105 MG/DL (ref 65–99)
GLUCOSE BLD-MCNC: 107 MG/DL (ref 65–99)
GLUCOSE BLD-MCNC: 190 MG/DL (ref 65–99)
GLUCOSE BLD-MCNC: 267 MG/DL (ref 65–99)
P AXIS: 67 DEGREES
P-R INTERVAL: 136 MS
Q-T INTERVAL: 350 MS
QRS DURATION: 90 MS
QTC CALCULATION (BEZET): 406 MS
R AXIS: -11 DEGREES
T AXIS: 28 DEGREES
TROPONIN: <0.046 NG/ML (ref ?–0.05)
VENTRICULAR RATE: 81 BPM

## 2017-06-28 PROCEDURE — 80048 BASIC METABOLIC PNL TOTAL CA: CPT | Performed by: INTERNAL MEDICINE

## 2017-06-28 PROCEDURE — 94640 AIRWAY INHALATION TREATMENT: CPT

## 2017-06-28 PROCEDURE — 82962 GLUCOSE BLOOD TEST: CPT

## 2017-06-28 PROCEDURE — 84484 ASSAY OF TROPONIN QUANT: CPT | Performed by: HOSPITALIST

## 2017-06-28 RX ORDER — PREDNISONE 20 MG/1
20 TABLET ORAL
Status: DISCONTINUED | OUTPATIENT
Start: 2017-06-28 | End: 2017-06-29

## 2017-06-28 RX ORDER — CLONAZEPAM 0.5 MG/1
2 TABLET ORAL NIGHTLY PRN
Status: DISCONTINUED | OUTPATIENT
Start: 2017-06-28 | End: 2017-06-29

## 2017-06-28 NOTE — H&P
[facility] Hospitalist History and Physical      CC: Chest pain    PCP: Shelby Baum MD    History of Present Illness: Patient is a 79year old female with PMH sig for COPD on home 02, ASHLEY, HTN, HL presenting with left sided chest pain since 2am. She reports she thyroidectomy    • Thyroid ca [facility]) 6/27/2013   • Thyroid cancer ([facility]     localized, s/p resection        Medications:  Outpatient Meds:    No current facility-administered medications on file prior to encounter.    Current Outpatient Prescriptions on File mouth 3 (three) times daily as needed for cough. Disp: 60 capsule Rfl: 0   lisinopril 5 MG Oral Tab Take 5 mg by mouth daily. Disp:  Rfl:    Levothyroxine Sodium 137 MCG Oral Tab Take 137 mcg by mouth once daily.  Disp: 90 tablet Rfl: 3   TraMADol HCl 50 MG AC   • Pravastatin Sodium  20 mg Oral Nightly   • Umeclidinium Bromide  1 puff Inhalation Daily   • Tiotropium Bromide Monohydrate  2 puff Inhalation Daily   • TraZODone HCl  50 mg Oral Nightly   • insulin aspart  1-5 Units Subcutaneous TID CC and HS     C 06/27/2017   TP 8.0 06/27/2017   AST 18 06/27/2017   ALT 17 06/27/2017   PTT 35.1 06/27/2017   INR 1.03 06/27/2017   PTP 13.5 06/27/2017   DDIMER 1.52 06/27/2017   TROP <0.046 06/27/2017       Above labs reviewed.     Radiology:    I independently reviewed previous hospital records reviewed.    [facility] hospitalist to continue to follow patient while in house     Jair Howard MD  [facility] Hospitalist  Pager: [phone]

## 2017-06-28 NOTE — BH PROGRESS NOTE
[facility] Resource Referral Counselor Note    Ilda Wild Patient Status:  Observation    3/7/1950 MRN [id number]   [facility] 8NE-A Attending Ron Murdock MD   Hosp Day # 0 PCP Sabrina Saunders MD       S(subjective) Pa

## 2017-06-28 NOTE — PAYOR COMM NOTE
--------------  ADMISSION REVIEW     Payor: [employer] MEDIC[employer] ADV HMO  Subscriber #:  [id number]  Authorization Number: N/A    Admit date: 6/27/2017  1:26 PM       Admitting Physician: Subha Brar MD  Attending Physician:  Subha Brar MD evaluation of the patient.    CBC W/ DIFFERENTIAL - Abnormal; Notable for the following:     HGB 11.6 (*)     MCV 80.8 (*)     MCH 23.4 (*)     MCHC 29.0 (*)     RDW 17.0 (*)     RDW-SD 49.3 (*)     Neutrophil Absolute Prelim 8.40 (*)     Neutrophil Absolut underlying COPD  - No fever here or leukocytosis, CXR doesn't look like PNA  - Will check procal  - She is not wheezy but has poor airmovement  - Mild COPD exacerbation?  - Will ask pulm to see in the AM- schedule nebs overnight- will hold on steroids for Route User    6/28/2017 0805 Given 20 mg Oral Cody Salazar RN      Pravastatin Sodium (PRAVACHOL) tab 20 mg     Date Action Dose Route User    6/27/2017 2126 Given 20 mg Oral Bernard MAHONEY RN      TraZODone HCl (DESYREL) tab 50 mg     Date Acti

## 2017-06-28 NOTE — CONSULTS
[address] Patient Status:  Observation    3/7/1950 MRN [id number]   [facility] 8NE-A Attending Mireya Srivastava MD   Hosp Day # 0 PCP Timothy Brewer MD     Date of Admission: 2017  Admission Diagnosis: Acute RIGHT  No date: BIOPSY OF BREAST, INCISIONAL      Comment: multiple, all benign  2007: BIOPSY OF BREAST, INCISIONAL      Comment: findings: benign; by Dr Sam Azevedo in                [place]  No date:   2009: Vance Gifford cord polyps  4/22/2015: PATIENT DOCUMENTED NOT TO HAVE EXPERIENCED ANY*      Comment: Procedure: ;  Surgeon: Amparo Hernandez MD;                 Location: [address] [place] [address] MANAGEMENT  4/22/2015: PATIENT [facility] ORDER FOR IV ANT*      Comme • Obesity Mother    • Psychiatric Mother          Home Medications:    Outpatient Prescriptions Marked as Taking for the 6/27/17 encounter [facility] Encounter):   ALENDRONATE SODIUM 70 MG Oral Tab TAKE 1 TABLET EVERY WEEK (Patient taking differently: TAKE mouth daily. Disp:  Rfl:    Levothyroxine Sodium 137 MCG Oral Tab Take 137 mcg by mouth once daily. Disp: 90 tablet Rfl: 3   TraMADol HCl 50 MG Oral Tab Take 2 tablets (100 mg total) by mouth every 6 (six) hours as needed for Pain.  Disp: 60 tablet Rfl: 0 AC  •  Pravastatin Sodium (PRAVACHOL) tab 20 mg, 20 mg, Oral, Nightly  •  Umeclidinium Bromide (INCRUSE ELLIPTA) 62.5 MCG/INH inhaler 1 puff, 1 puff, Inhalation, Daily  •  TraZODone HCl (DESYREL) tab 50 mg, 50 mg, Oral, Nightly  •  glucose (DEX4) oral liqu appears stated age and cooperative  Head: Normocephalic, without obvious abnormality, atraumatic  Neck: no adenopathy, no carotid bruit, no JVD, supple, symmetrical, trachea midline and thyroid not enlarged, symmetric, no tenderness/mass/nodules  Lungs: cl needed    Andrea Robins MD  6/28/2017  9:42 AM

## 2017-06-28 NOTE — PROGRESS NOTES
[facility] Hospitalist Progress Note                                                                   [address]  3/7/1950    SUBJECTIVE:   Breathing feels better since admission. No fevers. Bromide, glucose **OR** Glucose-Vitamin C **OR** dextrose 50% **OR** glucose **OR** Glucose-Vitamin C    Assessment/Plan:  Principal Problem:    Acute chest pain         78 yo F admitted to left chest pressure since 2am, now resolved, and SOB over baseline

## 2017-06-28 NOTE — PLAN OF CARE
Diabetes/Glucose Control    • Glucose maintained within prescribed range Progressing        Patient/Family Goals    • Patient/Family Long Term Goal Progressing    • Patient/Family Short Term Goal Progressing        Cardiac monitor shows. ... SR  Alert and o

## 2017-06-29 ENCOUNTER — APPOINTMENT (OUTPATIENT)
Dept: CV DIAGNOSTICS | Facility: HOSPITAL | Age: 67
End: 2017-06-29
Attending: INTERNAL MEDICINE
Payer: MEDICARE

## 2017-06-29 VITALS
DIASTOLIC BLOOD PRESSURE: 64 MMHG | OXYGEN SATURATION: 98 % | TEMPERATURE: 98 F | HEART RATE: 63 BPM | BODY MASS INDEX: 48.87 KG/M2 | HEIGHT: 63 IN | RESPIRATION RATE: 18 BRPM | SYSTOLIC BLOOD PRESSURE: 113 MMHG | WEIGHT: 275.81 LBS

## 2017-06-29 LAB
BUN BLD-MCNC: 15 MG/DL (ref 8–20)
CALCIUM BLD-MCNC: 10.1 MG/DL (ref 8.3–10.3)
CHLORIDE: 101 MMOL/L (ref 101–111)
CO2: 33 MMOL/L (ref 22–32)
CREAT BLD-MCNC: 0.98 MG/DL (ref 0.55–1.02)
GLUCOSE BLD-MCNC: 102 MG/DL (ref 70–99)
GLUCOSE BLD-MCNC: 120 MG/DL (ref 65–99)
GLUCOSE BLD-MCNC: 180 MG/DL (ref 65–99)
GLUCOSE BLD-MCNC: 195 MG/DL (ref 65–99)
POTASSIUM SERPL-SCNC: 4.4 MMOL/L (ref 3.6–5.1)
SODIUM SERPL-SCNC: 140 MMOL/L (ref 136–144)

## 2017-06-29 PROCEDURE — 94640 AIRWAY INHALATION TREATMENT: CPT

## 2017-06-29 PROCEDURE — 93017 CV STRESS TEST TRACING ONLY: CPT | Performed by: INTERNAL MEDICINE

## 2017-06-29 PROCEDURE — 78452 HT MUSCLE IMAGE SPECT MULT: CPT | Performed by: INTERNAL MEDICINE

## 2017-06-29 PROCEDURE — 93018 CV STRESS TEST I&R ONLY: CPT | Performed by: INTERNAL MEDICINE

## 2017-06-29 PROCEDURE — 96372 THER/PROPH/DIAG INJ SC/IM: CPT

## 2017-06-29 PROCEDURE — 82962 GLUCOSE BLOOD TEST: CPT

## 2017-06-29 RX ORDER — HEPARIN SODIUM 5000 [USP'U]/ML
5000 INJECTION, SOLUTION INTRAVENOUS; SUBCUTANEOUS EVERY 8 HOURS SCHEDULED
Status: DISCONTINUED | OUTPATIENT
Start: 2017-06-29 | End: 2017-06-29

## 2017-06-29 RX ORDER — PREDNISONE 20 MG/1
20 TABLET ORAL
Qty: 3 TABLET | Refills: 0 | Status: SHIPPED | OUTPATIENT
Start: 2017-06-29 | End: 2017-06-29

## 2017-06-29 RX ORDER — PREDNISONE 20 MG/1
20 TABLET ORAL
Qty: 3 TABLET | Refills: 0 | Status: SHIPPED | OUTPATIENT
Start: 2017-06-29 | End: 2017-07-12 | Stop reason: ALTCHOICE

## 2017-06-29 RX ORDER — FUROSEMIDE 40 MG/1
40 TABLET ORAL DAILY
Qty: 30 TABLET | Refills: 6 | Status: SHIPPED | OUTPATIENT
Start: 2017-06-30 | End: 2017-11-27

## 2017-06-29 RX ORDER — AMINOPHYLLINE DIHYDRATE 25 MG/ML
INJECTION, SOLUTION INTRAVENOUS
Status: COMPLETED
Start: 2017-06-29 | End: 2017-06-29

## 2017-06-29 RX ORDER — FUROSEMIDE 40 MG/1
40 TABLET ORAL DAILY
Status: DISCONTINUED | OUTPATIENT
Start: 2017-06-30 | End: 2017-06-29

## 2017-06-29 NOTE — PROGRESS NOTES
Patient completed the [facility] Stress test. After receiving Vergil Danker the patient had 6/10 chest pain with wheezing. Oxygen at 2L was given along with Aminophylline 125 mgms with resolution of chest pain. No ekg changes were noted.

## 2017-06-29 NOTE — DISCHARGE SUMMARY
General Medicine Discharge Summary     Patient ID:  Jovita Finley  79year old  3/7/1950    Admit date: 6/27/2017    Discharge date and time: 6/29/2017    Attending Physician: Pamela Ledesma MD have NOT CHANGED    ALENDRONATE SODIUM 70 MG Oral Tab  TAKE 1 TABLET EVERY WEEK    atenolol 50 MG Oral Tab  Take 1 tablet (50 mg total) by mouth daily.     ClonazePAM 2 MG Oral Tab  TAKE 1 TABLET EVERY DAY AS NEEDED FOR ANXIETY  (DOSE  DECREASED)    glimepi as needed for Pain.       STOP taking these medications    Tiotropium Bromide Monohydrate (SPIRIVA HANDIHALER) 18 MCG Inhalation Cap    Activity: activity as tolerated  Diet: diabetic diet  Wound Care: as directed  Code Status: Full Code      Exam on day of

## 2017-06-29 NOTE — PROGRESS NOTES
06/28/17 2015   Vital Signs   Temp 98 °F (36.7 °C)   Temp src Oral   Pulse 78   Heart Rate Source Monitor   Resp 20   /61   BP Location Left arm   BP Method Automatic   Patient Position Lying   Oxygen Therapy   SpO2 95 %   O2 Device Nasal cannula

## 2017-06-29 NOTE — PROGRESS NOTES
[address] Patient Status:  Observation    3/7/1950 MRN [id number]   [facility] 8NE-A Attending Olegario Villarreal MD   Hosp Day # 0 PCP Campos Dunn MD     SUBJECTIVE:overall feels better.  No cough nor CP     OBJ Glucose-Vitamin C (DEX-4) 4-0.006 g chewable tab 4 tablet, 4 tablet, Oral, Q15 Min PRN **OR** dextrose 50% injection 50 mL, 50 mL, Intravenous, Q15 Min PRN **OR** glucose (DEX4) oral liquid 30 g, 30 g, Oral, Q15 Min PRN **OR** Glucose-Vitamin C (DEX-4) 4-0

## 2017-06-29 NOTE — PROGRESS NOTES
[facility]  Progress Note    Sander Soulier Patient Status:  Observation    3/7/1950 MRN [id number]   [facility] 8NE-A Attending Lachelle Lora MD   Hosp Day # 0 PCP Patel Parr MD     Subjective:   No further Readings from Last 3 Encounters:  06/28/17 : 275 lb 12.7 oz (125.1 kg)  05/03/17 : 284 lb (128.8 kg)  02/02/17 : 283 lb (128.4 kg)      Allergies:    Codeine                 Hives  Iodine [Radiology C*    Anaphylaxis    Comment:KIDNEY FAILURE  Mobic [[facility]   Lab  06/27/17   1339  06/27/17   1918  06/28/17   0028   TROP  <0.046  <0.046  <0.046         Tele: SR.        Assessment:    1. Chest pain   -Highly atypical for ischemia   -R/O MI   -Verbal report on stress negative  2. COPD  3.  HL  4.   HTN

## 2017-07-11 PROBLEM — E66.2 OBESITY HYPOVENTILATION SYNDROME (HCC): Status: ACTIVE | Noted: 2017-07-11

## 2017-07-12 PROCEDURE — 82043 UR ALBUMIN QUANTITATIVE: CPT | Performed by: INTERNAL MEDICINE

## 2017-07-12 PROCEDURE — 82570 ASSAY OF URINE CREATININE: CPT | Performed by: INTERNAL MEDICINE

## 2017-11-17 PROBLEM — E21.3 HYPERPARATHYROIDISM (HCC): Status: ACTIVE | Noted: 2017-11-17

## 2018-02-05 PROBLEM — F13.20 BENZODIAZEPINE DEPENDENCE, CONTINUOUS (HCC): Status: ACTIVE | Noted: 2018-02-05

## 2018-02-08 PROBLEM — I50.32 CHRONIC DIASTOLIC CHF (CONGESTIVE HEART FAILURE) (HCC): Status: ACTIVE | Noted: 2018-02-08

## 2018-02-09 PROBLEM — J44.9 PULMONARY HYPERTENSION DUE TO COPD (HCC): Status: ACTIVE | Noted: 2018-02-09

## 2018-02-09 PROBLEM — J96.11 CHRONIC RESPIRATORY FAILURE WITH HYPOXIA (HCC): Status: ACTIVE | Noted: 2018-02-09

## 2018-02-09 PROBLEM — I27.23 PULMONARY HYPERTENSION DUE TO COPD (HCC): Status: ACTIVE | Noted: 2018-02-09

## 2018-02-09 PROBLEM — F33.1 MAJOR DEPRESSIVE DISORDER, RECURRENT, MODERATE (HCC): Status: ACTIVE | Noted: 2018-02-09

## 2018-02-09 PROBLEM — R07.9 ACUTE CHEST PAIN: Status: RESOLVED | Noted: 2017-06-27 | Resolved: 2018-02-09

## 2018-04-02 PROBLEM — Z63.8 FAMILY CONFLICT: Status: ACTIVE | Noted: 2018-04-02

## 2018-04-05 RX ORDER — DEXTROSE MONOHYDRATE 25 G/50ML
50 INJECTION, SOLUTION INTRAVENOUS
Status: CANCELLED | OUTPATIENT
Start: 2018-04-05

## 2018-05-14 ENCOUNTER — HOSPITAL ENCOUNTER (OUTPATIENT)
Facility: HOSPITAL | Age: 68
Discharge: HOME | End: 2018-05-16
Attending: SURGERY | Admitting: SURGERY
Payer: MEDICARE

## 2018-05-14 ENCOUNTER — SURGERY (OUTPATIENT)
Age: 68
End: 2018-05-14

## 2018-05-14 ENCOUNTER — ANESTHESIA (OUTPATIENT)
Dept: SURGERY | Facility: HOSPITAL | Age: 68
End: 2018-05-14
Payer: MEDICARE

## 2018-05-14 ENCOUNTER — ANESTHESIA EVENT (OUTPATIENT)
Dept: SURGERY | Facility: HOSPITAL | Age: 68
End: 2018-05-14
Payer: MEDICARE

## 2018-05-14 DIAGNOSIS — K43.9 VENTRAL HERNIA WITHOUT OBSTRUCTION OR GANGRENE: ICD-10-CM

## 2018-05-14 PROCEDURE — 0WUF4JZ SUPPLEMENT ABDOMINAL WALL WITH SYNTHETIC SUBSTITUTE, PERCUTANEOUS ENDOSCOPIC APPROACH: ICD-10-PCS | Performed by: SURGERY

## 2018-05-14 PROCEDURE — 82962 GLUCOSE BLOOD TEST: CPT

## 2018-05-14 PROCEDURE — 94660 CPAP INITIATION&MGMT: CPT

## 2018-05-14 PROCEDURE — 8E0W4CZ ROBOTIC ASSISTED PROCEDURE OF TRUNK REGION, PERCUTANEOUS ENDOSCOPIC APPROACH: ICD-10-PCS | Performed by: SURGERY

## 2018-05-14 DEVICE — VENTRIO ST HERNIA PATCH
Type: IMPLANTABLE DEVICE | Site: ABDOMEN | Status: FUNCTIONAL
Brand: VENTRIO ST HERNIA PATCH

## 2018-05-14 RX ORDER — ZOLPIDEM TARTRATE 10 MG/1
10 TABLET ORAL NIGHTLY PRN
Status: DISCONTINUED | OUTPATIENT
Start: 2018-05-14 | End: 2018-05-16

## 2018-05-14 RX ORDER — HEPARIN SODIUM 5000 [USP'U]/ML
7500 INJECTION, SOLUTION INTRAVENOUS; SUBCUTANEOUS EVERY 8 HOURS SCHEDULED
Status: DISCONTINUED | OUTPATIENT
Start: 2018-05-14 | End: 2018-05-16

## 2018-05-14 RX ORDER — DULOXETIN HYDROCHLORIDE 30 MG/1
90 CAPSULE, DELAYED RELEASE ORAL DAILY
COMMUNITY
End: 2018-08-13

## 2018-05-14 RX ORDER — ASPIRIN 81 MG/1
81 TABLET ORAL DAILY
COMMUNITY

## 2018-05-14 RX ORDER — MIRTAZAPINE 15 MG/1
15 TABLET, FILM COATED ORAL NIGHTLY
Status: DISCONTINUED | OUTPATIENT
Start: 2018-05-14 | End: 2018-05-16

## 2018-05-14 RX ORDER — QUETIAPINE 50 MG/1
50 TABLET, FILM COATED ORAL DAILY
Status: DISCONTINUED | OUTPATIENT
Start: 2018-05-15 | End: 2018-05-16

## 2018-05-14 RX ORDER — HYDROMORPHONE HYDROCHLORIDE 1 MG/ML
0.4 INJECTION, SOLUTION INTRAMUSCULAR; INTRAVENOUS; SUBCUTANEOUS EVERY 5 MIN PRN
Status: DISCONTINUED | OUTPATIENT
Start: 2018-05-14 | End: 2018-05-14 | Stop reason: HOSPADM

## 2018-05-14 RX ORDER — CLONAZEPAM 2 MG/1
2 TABLET ORAL
COMMUNITY
End: 2019-02-21 | Stop reason: ALTCHOICE

## 2018-05-14 RX ORDER — METOPROLOL SUCCINATE 25 MG/1
25 TABLET, EXTENDED RELEASE ORAL DAILY
COMMUNITY
End: 2018-10-02

## 2018-05-14 RX ORDER — LEVOTHYROXINE SODIUM 137 UG/1
137 TABLET ORAL
COMMUNITY
End: 2019-10-17

## 2018-05-14 RX ORDER — BUPIVACAINE HYDROCHLORIDE AND EPINEPHRINE 5; 5 MG/ML; UG/ML
INJECTION, SOLUTION EPIDURAL; INTRACAUDAL; PERINEURAL AS NEEDED
Status: DISCONTINUED | OUTPATIENT
Start: 2018-05-14 | End: 2018-05-14 | Stop reason: HOSPADM

## 2018-05-14 RX ORDER — HYDROMORPHONE HYDROCHLORIDE 1 MG/ML
0.8 INJECTION, SOLUTION INTRAMUSCULAR; INTRAVENOUS; SUBCUTANEOUS EVERY 2 HOUR PRN
Status: DISCONTINUED | OUTPATIENT
Start: 2018-05-14 | End: 2018-05-16

## 2018-05-14 RX ORDER — METOCLOPRAMIDE HYDROCHLORIDE 5 MG/ML
10 INJECTION INTRAMUSCULAR; INTRAVENOUS AS NEEDED
Status: DISCONTINUED | OUTPATIENT
Start: 2018-05-14 | End: 2018-05-14 | Stop reason: HOSPADM

## 2018-05-14 RX ORDER — HYDROMORPHONE HYDROCHLORIDE 1 MG/ML
1.2 INJECTION, SOLUTION INTRAMUSCULAR; INTRAVENOUS; SUBCUTANEOUS EVERY 2 HOUR PRN
Status: DISCONTINUED | OUTPATIENT
Start: 2018-05-14 | End: 2018-05-16

## 2018-05-14 RX ORDER — HYDROCODONE BITARTRATE AND ACETAMINOPHEN 5; 325 MG/1; MG/1
1 TABLET ORAL EVERY 4 HOURS PRN
Status: DISCONTINUED | OUTPATIENT
Start: 2018-05-14 | End: 2018-05-16

## 2018-05-14 RX ORDER — HEPARIN SODIUM 5000 [USP'U]/ML
5000 INJECTION, SOLUTION INTRAVENOUS; SUBCUTANEOUS ONCE
Status: COMPLETED | OUTPATIENT
Start: 2018-05-14 | End: 2018-05-14

## 2018-05-14 RX ORDER — SODIUM CHLORIDE, SODIUM LACTATE, POTASSIUM CHLORIDE, CALCIUM CHLORIDE 600; 310; 30; 20 MG/100ML; MG/100ML; MG/100ML; MG/100ML
INJECTION, SOLUTION INTRAVENOUS CONTINUOUS
Status: DISCONTINUED | OUTPATIENT
Start: 2018-05-14 | End: 2018-05-14 | Stop reason: HOSPADM

## 2018-05-14 RX ORDER — GLIMEPIRIDE 4 MG/1
4 TABLET ORAL
COMMUNITY
End: 2019-08-01

## 2018-05-14 RX ORDER — DEXTROSE, SODIUM CHLORIDE, AND POTASSIUM CHLORIDE 5; .45; .15 G/100ML; G/100ML; G/100ML
INJECTION INTRAVENOUS CONTINUOUS
Status: DISCONTINUED | OUTPATIENT
Start: 2018-05-14 | End: 2018-05-16

## 2018-05-14 RX ORDER — OMEPRAZOLE 20 MG/1
40 CAPSULE, DELAYED RELEASE ORAL
COMMUNITY
End: 2019-04-19

## 2018-05-14 RX ORDER — DEXTROSE, SODIUM CHLORIDE, AND POTASSIUM CHLORIDE 5; .45; .15 G/100ML; G/100ML; G/100ML
INJECTION INTRAVENOUS
Status: COMPLETED
Start: 2018-05-14 | End: 2018-05-14

## 2018-05-14 RX ORDER — FLUOROMETHOLONE 0.1 %
1 SUSPENSION, DROPS(FINAL DOSAGE FORM)(ML) OPHTHALMIC (EYE) 4 TIMES DAILY
Status: DISCONTINUED | OUTPATIENT
Start: 2018-05-14 | End: 2018-05-16

## 2018-05-14 RX ORDER — METOPROLOL SUCCINATE 25 MG/1
25 TABLET, EXTENDED RELEASE ORAL
Status: DISCONTINUED | OUTPATIENT
Start: 2018-05-15 | End: 2018-05-16

## 2018-05-14 RX ORDER — CLONAZEPAM 1 MG/1
2 TABLET ORAL
Status: DISCONTINUED | OUTPATIENT
Start: 2018-05-14 | End: 2018-05-16

## 2018-05-14 RX ORDER — ACETAMINOPHEN 500 MG
1000 TABLET ORAL ONCE
COMMUNITY
End: 2019-02-21 | Stop reason: ALTCHOICE

## 2018-05-14 RX ORDER — ALENDRONATE SODIUM 70 MG/1
70 TABLET ORAL WEEKLY
COMMUNITY
End: 2019-02-14

## 2018-05-14 RX ORDER — QUETIAPINE 50 MG/1
50 TABLET, FILM COATED ORAL DAILY
COMMUNITY
End: 2018-11-09 | Stop reason: ALTCHOICE

## 2018-05-14 RX ORDER — ONDANSETRON 2 MG/ML
4 INJECTION INTRAMUSCULAR; INTRAVENOUS AS NEEDED
Status: DISCONTINUED | OUTPATIENT
Start: 2018-05-14 | End: 2018-05-14 | Stop reason: HOSPADM

## 2018-05-14 RX ORDER — HYDROCODONE BITARTRATE AND ACETAMINOPHEN 10; 325 MG/1; MG/1
1 TABLET ORAL AS NEEDED
Status: DISCONTINUED | OUTPATIENT
Start: 2018-05-14 | End: 2018-05-14 | Stop reason: HOSPADM

## 2018-05-14 RX ORDER — CIPROFLOXACIN HYDROCHLORIDE 3.5 MG/ML
1 SOLUTION/ DROPS TOPICAL
Status: DISCONTINUED | OUTPATIENT
Start: 2018-05-14 | End: 2018-05-16

## 2018-05-14 RX ORDER — DULOXETIN HYDROCHLORIDE 30 MG/1
90 CAPSULE, DELAYED RELEASE ORAL DAILY
Status: DISCONTINUED | OUTPATIENT
Start: 2018-05-15 | End: 2018-05-16

## 2018-05-14 RX ORDER — METOCLOPRAMIDE HYDROCHLORIDE 5 MG/ML
10 INJECTION INTRAMUSCULAR; INTRAVENOUS EVERY 6 HOURS PRN
Status: DISCONTINUED | OUTPATIENT
Start: 2018-05-14 | End: 2018-05-16

## 2018-05-14 RX ORDER — HYDROCODONE BITARTRATE AND ACETAMINOPHEN 10; 325 MG/1; MG/1
2 TABLET ORAL AS NEEDED
Status: DISCONTINUED | OUTPATIENT
Start: 2018-05-14 | End: 2018-05-14 | Stop reason: HOSPADM

## 2018-05-14 RX ORDER — DEXTROSE MONOHYDRATE 25 G/50ML
50 INJECTION, SOLUTION INTRAVENOUS
Status: DISCONTINUED | OUTPATIENT
Start: 2018-05-14 | End: 2018-05-16

## 2018-05-14 RX ORDER — NALOXONE HYDROCHLORIDE 0.4 MG/ML
80 INJECTION, SOLUTION INTRAMUSCULAR; INTRAVENOUS; SUBCUTANEOUS AS NEEDED
Status: DISCONTINUED | OUTPATIENT
Start: 2018-05-14 | End: 2018-05-14 | Stop reason: HOSPADM

## 2018-05-14 RX ORDER — TRAMADOL HYDROCHLORIDE 50 MG/1
50 TABLET ORAL EVERY 6 HOURS PRN
COMMUNITY
End: 2018-07-27

## 2018-05-14 RX ORDER — ESZOPICLONE 3 MG/1
3 TABLET, FILM COATED ORAL NIGHTLY
COMMUNITY
End: 2018-05-29

## 2018-05-14 RX ORDER — FLUTICASONE PROPIONATE 50 MCG
2 SPRAY, SUSPENSION (ML) NASAL DAILY
Status: DISCONTINUED | OUTPATIENT
Start: 2018-05-15 | End: 2018-05-16

## 2018-05-14 RX ORDER — ONDANSETRON 2 MG/ML
4 INJECTION INTRAMUSCULAR; INTRAVENOUS EVERY 6 HOURS PRN
Status: DISCONTINUED | OUTPATIENT
Start: 2018-05-14 | End: 2018-05-16

## 2018-05-14 RX ORDER — HYDROMORPHONE HYDROCHLORIDE 1 MG/ML
0.4 INJECTION, SOLUTION INTRAMUSCULAR; INTRAVENOUS; SUBCUTANEOUS EVERY 2 HOUR PRN
Status: DISCONTINUED | OUTPATIENT
Start: 2018-05-14 | End: 2018-05-16

## 2018-05-14 RX ORDER — MEPERIDINE HYDROCHLORIDE 25 MG/ML
12.5 INJECTION INTRAMUSCULAR; INTRAVENOUS; SUBCUTANEOUS AS NEEDED
Status: DISCONTINUED | OUTPATIENT
Start: 2018-05-14 | End: 2018-05-14 | Stop reason: HOSPADM

## 2018-05-14 RX ORDER — HYDROCODONE BITARTRATE AND ACETAMINOPHEN 5; 325 MG/1; MG/1
2 TABLET ORAL EVERY 4 HOURS PRN
Status: DISCONTINUED | OUTPATIENT
Start: 2018-05-14 | End: 2018-05-16

## 2018-05-14 RX ORDER — PANTOPRAZOLE SODIUM 40 MG/1
40 TABLET, DELAYED RELEASE ORAL
Status: DISCONTINUED | OUTPATIENT
Start: 2018-05-15 | End: 2018-05-16

## 2018-05-14 RX ORDER — DEXTROSE MONOHYDRATE 25 G/50ML
50 INJECTION, SOLUTION INTRAVENOUS
Status: DISCONTINUED | OUTPATIENT
Start: 2018-05-14 | End: 2018-05-14 | Stop reason: HOSPADM

## 2018-05-14 NOTE — OPERATIVE REPORT
[facility]    PATIENT'S NAME: Severiano Grieves   ATTENDING PHYSICIAN: Curtis Samuel M.D. OPERATING PHYSICIAN: Curtis Samuel M.D.    PATIENT ACCOUNT#:   [de-identified]    LOCATION:  PACU [facility] PACU 3 ED [address] [facility] #:   [id number]       DATE OF BIRTH:  [birth year]/ the abdominal cavity to measure the hernia defects and suture repair. An 8 x 12 cm Ventrio ST mesh was then placed in the abdominal cavity.   It was then secured to the posterior fascia with an EndoClose device and suture to hold it in place while a 2-0 V-

## 2018-05-14 NOTE — CONSULTS
[facility] Hospitalist H&P       CC: medicine consult for comanagement of medical conditions     PCP: Viji Gary MD    History of Present Illness: Patient is a 76year old female with PMH sig for remote A fib, HTN, HL, T2DM, COPD, ASHLEY on cpap, hx thyroid ca date: BIOPSY OF BREAST, INCISIONAL      Comment: multiple, all benign  2007: BIOPSY OF BREAST, INCISIONAL      Comment: findings: benign; by Dr Bryson Devlin in                [place]  No date:   2009: COLONOSCOPY,BIOPSY      Comment: fin Surgeon: Lalita Swan MD;                 Location: [address] [place] [address]  No date: NEEDLE BIOPSY LEFT  No date: NEEDLE BIOPSY RIGHT  2008: OTHER      Comment: removed benign tumor lt  4/22/2015: PATIENT DOCUMENTED NOT TO HAVE EXPERIENCED ANY* by mouth daily as needed for Anxiety. Disp:  Rfl:    DULoxetine HCl 30 MG Oral Cap DR Particles Take 90 mg by mouth daily.  Disp:  Rfl:    glimepiride 4 MG Oral Tab Take 4 mg by mouth every morning before breakfast. Disp:  Rfl:    Levothyroxine Sodium 137 M Disp:  Rfl: 0   [DISCONTINUED] fluorometholone 0.1 % Ophthalmic Suspension Apply 1 drop to eye 4 (four) times daily.  Disp: 1 Bottle Rfl: 1   [DISCONTINUED] TRAZODONE HCL 50 MG Oral Tab TAKE 1 TABLET EVERY NIGHT (DOSE ADJUST AS OF 3/8/17) Disp: 30 tablet Rf kg/m²   General:  Drowsy but easily arousable   Head:  Normocephalic, without obvious abnormality, atraumatic. Eyes:  Sclera anicteric, No conjunctival pallor, EOMs intact. Nose: Nares normal. Septum midline. Mucosa normal. No drainage.    Throat: Lips pain  -norco prn with bowel regimen to prevent constipation  -dilaudid IV prn depending on pain severity    T2DM  -A1c 7  -holding PO hypoglycemic agents  -SSI and accuchecks    HTN, HL  -cont BB  -hold furosemide until adequate PO intake  -resume lisinopr

## 2018-05-14 NOTE — PROGRESS NOTES
[facility] Progress Note:  Anticoagulation Weight Dose Adjustment for heparin    Contreras Skaggs is a 76year old female who has been prescribed heparin 5000 units every 8 hrs for VTE prophylaxis.       CrCl cannot be calculated (Patient's most recent lab

## 2018-05-14 NOTE — ANESTHESIA PREPROCEDURE EVALUATION
PRE-OP EVALUATION    Patient Name: Jerry Delphi    Pre-op Diagnosis: Ventral hernia without obstruction or gangrene [K43.9]    Procedure(s):  ROBOTIC ASSISTED REPAIR INCARCERATED VENTRAL HERNIA  POSSIBLE MESH    Surgeon(s) and Role:     Jannie Stewart MD INSTILL 1 DROP IN AFFECTED EYE(S) FOUR TIMES DAILY Disp: 5 mL Rfl: 0   lisinopril 5 MG Oral Tab Take 1 tablet (5 mg total) by mouth daily. Disp: 90 tablet Rfl: 3   furosemide 40 MG Oral Tab Take 1.5 tablets (60 mg total) by mouth daily.  Disp: 135 tablet Rf Propionate 50 MCG/ACT Nasal Suspension 2 sprays by Nasal route daily. Disp: 1 Bottle Rfl: 3   Cholecalciferol (VITAMIN D) 2000 UNITS Oral Cap Take 10,000 Units by mouth daily. Disp:  Rfl:        Allergies: Codeine;  Iodine [Radiology Contrast Iodinated Dy Comment: Procedure: CAUDAL EPIDURAL STEROID INJECTION;                Surgeon: Shaka Roth MD;  Location: [facility]  4/1/2015: INJECTION, W/WO CONTRAST, DX/THERAPEUTIC SUBST* N/A      Comment: Procedure: LUMBAR EPIDURAL;  Surgeon: TONSILLECTOMY  8/11/2009: UPPER GI ENDOSCOPY,BIOPSY      Comment: findings: hiatal hernia, mild gastritis; bx                results unknown; by Dr Clara Aviles     Smoking status: Former Smoker  3.00 Packs/day  For 50.00 Years     Types: Cigarettes    Start

## 2018-05-14 NOTE — BRIEF OP NOTE
Pre-Operative Diagnosis: Ventral hernia without obstruction or gangrene [K43.9]     Post-Operative Diagnosis: Ventral hernia without obstruction or gangrene [K43.9]      Procedure Performed:   Procedure(s):  ROBOTIC ASSISTED REPAIR OF COMPLEX INCARCERATED

## 2018-05-14 NOTE — H&P
HPI:     Ilda Wild is a 79year old female who presents for evaluation of ventral hernia. Patient has a long-standing ventral hernia. Patient had a previous hernia repair several years ago.  She also had a hysterectomy with a complicated postoperative date:   2009: COLONOSCOPY,BIOPSY      Comment: findings: mild non-specific erythema asc                colon; bx results unknown; by Dr Shireen Hernandez in                [facility] 1986: DISK SURG,ANTER,CERVICAL,SINGLE LVL      Comment: C5-C7 PATIENT [place] PREOPERATIVE ORDER FOR IV ANT*      Comment: Procedure: ;  Surgeon: Casey Goff MD;                 Location: [facility]  abt 1999: REMOVAL OF LUNG,LOBECTOMY      Comment: VATS right; told has a pseudotumor  abt 200 differently: TAKE 1 TABLET EVERY WEEK Tuesday) Disp: 12 tablet Rfl: 1   INCRUSE ELLIPTA 62.5 MCG/INH Inhalation Aerosol Powder, Breath Activated INHALE 1 PUFF BY MOUTH DAILY Disp: 30 each Rfl: 1   simvastatin 10 MG Oral Tab Take 1 tablet (10 mg total) by m Mother           Smoking status: Former Smoker                                                              Packs/day: 2.00      Years: 40.00        Types: Cigarettes     Quit date: 1/1/1999  Smokeless tobacco: Never Used                      Alcohol use:

## 2018-05-14 NOTE — ANESTHESIA POSTPROCEDURE EVALUATION
[address] Patient Status:  Outpatient in a Bed   Age/Gender 76year old female MRN [id number]   [facility] SURGERY Attending Hillary Luna MD   Hosp Day # 0 PCP Rola Gray MD       Anesthesia Post-op Note    Procedure

## 2018-05-15 ENCOUNTER — APPOINTMENT (OUTPATIENT)
Dept: GENERAL RADIOLOGY | Facility: HOSPITAL | Age: 68
End: 2018-05-15
Attending: INTERNAL MEDICINE
Payer: MEDICARE

## 2018-05-15 PROBLEM — K43.9 VENTRAL HERNIA WITHOUT OBSTRUCTION OR GANGRENE: Status: ACTIVE | Noted: 2018-05-15

## 2018-05-15 PROCEDURE — 97116 GAIT TRAINING THERAPY: CPT

## 2018-05-15 PROCEDURE — 94640 AIRWAY INHALATION TREATMENT: CPT

## 2018-05-15 PROCEDURE — 85027 COMPLETE CBC AUTOMATED: CPT | Performed by: SURGERY

## 2018-05-15 PROCEDURE — 97162 PT EVAL MOD COMPLEX 30 MIN: CPT

## 2018-05-15 PROCEDURE — 82962 GLUCOSE BLOOD TEST: CPT

## 2018-05-15 PROCEDURE — 80053 COMPREHEN METABOLIC PANEL: CPT | Performed by: SURGERY

## 2018-05-15 PROCEDURE — 71045 X-RAY EXAM CHEST 1 VIEW: CPT | Performed by: INTERNAL MEDICINE

## 2018-05-15 PROCEDURE — 81003 URINALYSIS AUTO W/O SCOPE: CPT | Performed by: HOSPITALIST

## 2018-05-15 RX ORDER — IPRATROPIUM BROMIDE AND ALBUTEROL SULFATE 2.5; .5 MG/3ML; MG/3ML
3 SOLUTION RESPIRATORY (INHALATION) EVERY 6 HOURS PRN
Status: DISCONTINUED | OUTPATIENT
Start: 2018-05-15 | End: 2018-05-16

## 2018-05-15 RX ORDER — FUROSEMIDE 10 MG/ML
20 INJECTION INTRAMUSCULAR; INTRAVENOUS ONCE
Status: COMPLETED | OUTPATIENT
Start: 2018-05-15 | End: 2018-05-15

## 2018-05-15 NOTE — PROGRESS NOTES
[facility]  Progress Note    Jear[facility] Heart Patient Status:  Observation    3/7/1950 MRN [id number]   [facility] 3NW-A Attending Lisa Berumen MD   Hosp Day # 0 PCP Gonzalo Jones MD     Subjective:    Patient tolerating PO diet, incisio dependence, continuous (HCC)     Chronic diastolic CHF (congestive heart failure) (HCC)     Major depressive disorder, recurrent, moderate (HCC)     Pulmonary hypertension due to COPD ([facility] [address]     Chronic respiratory failure with hypoxia ([facility] [address]     Family conf

## 2018-05-15 NOTE — PROGRESS NOTES
CPAP APPLIED BY RESPIRATORY AND SATS IN THE LOWER 90'S, PT WAKES AT 1645 AND C/O DIFFICULTY BREATHING WITH CPAP ON, ALSO PT AGITATED AND STATES SHE NEEDS TO URINATE, PT MORE AWAKE AND ALERT, ASSISTED TO BATHROOM AND UNABLE TO URINATE SO PT STRAIGHT CATHED

## 2018-05-15 NOTE — PHYSICAL THERAPY NOTE
PHYSICAL THERAPY EVALUATION - INPATIENT     Room Number: 320/320-A  Evaluation Date: 5/15/2018  Type of Evaluation: Initial  Physician Order: PT Eval and Treat    Presenting Problem: Ventral hernia and s/p robotic-assisted repair of incarcerated comp apnea     CPAP   • Status post thyroidectomy    • Thyroid ca [facility]) 6/27/2013   • Thyroid cancer ([facility] [address]     localized, s/p resection   • Visual impairment     GLASSES       Past Surgical History  Past Surgical History:  No date: APPENDECTOMY  No date: BENIGN polyps  4/1/2015: KIRAN BY  PHYS [facility] SVC 5+ YR N/A      Comment: Procedure: LUMBAR EPIDURAL;  Surgeon:                Asia Land MD;  Location: [place] [address] MANAGEMENT  4/22/2015: KIRAN BY  PHYS [address] [facility] 59  N SVC 5+ YR pt typically picks up her 2 grandsons from school and helps with afterschool care; pt reports being challenged c negotiating stoop into the home    SUBJECTIVE  \"I feel terrible, they cut my whole stomach open\"    Patient self-stated goal is decr pn     O (including a wheelchair)?: A Little   -   Need to walk in hospital room?: A Little   -   Climbing 3-5 steps with a railing?: A Lot       AM-PAC Score:  Raw Score: 18   PT Approx Degree of Impairment Score: 46.58%   Standardized Score (AM-PAC Scale): 43.63 following impairments posture, endurance, strength, gait and balance.   Functional outcome measures completed include The AM-PAC '6-Clicks' Inpatient Basic Mobility Short Form was completed and this patient is demonstrating a 46.58% degree of impairment in

## 2018-05-15 NOTE — BH PROGRESS NOTE
[facility] Resource Referral Counselor Note    Valley Angles Patient Status:  Observation    3/7/1950 MRN [id number]   [facility] 3NW-A Attending Gilbert Rivers MD   Hosp Day # 0 PCP Timothy Brewer MD       S(subjective) The pt stat

## 2018-05-15 NOTE — PROGRESS NOTES
[facility] Hospitalist Progress Note     PCP: Yeison Wilson MD    SUBJECTIVE:  No CP, SOB, or N/V. Patient has had 2 episodes of urinary retention requiring straight catheterization and again is unable to void presently. She has not passed flatus.   States sev Before breakfast   • Metoprolol Succinate ER  25 mg Oral Daily Beta Blocker   • mirtazapine  15 mg Oral Nightly   • ciprofloxacin HCl  1 drop Both Eyes TID & HS   • Pantoprazole Sodium  40 mg Oral QAM AC   • QUEtiapine Fumarate  50 mg Oral Daily   • Jose Ramon thyroidectomy; hypothyroidism  -cont synthroid     GERD  -PPI     Antiphospholipid antibody syndrome  -resume aspirin when ok per gen sx     Hx of depression  -cymbalta     Insomnia  -lunesta at home; zolpidem here  -seroquel     ASHLEY  -cpap protocol     Juan Antonio Maki

## 2018-05-15 NOTE — CONSULTS
Pulmonary Consult     Assessment / Plan:  1. Hypoxia - post op. Due to atelectasis. Has known COPD and ASHLEY/OHS  - CXR  - instructed on proper IS use  - if no improvement will need PE eval. She is currently not tachycardic.  She has no dyspnea or chest pain organism unspecified(486)    • Problems with swallowing     Due to nodules on thyroid RESOLVED WITH SURGERY   • Reflux    • Sleep apnea     CPAP   • Status post thyroidectomy    • Thyroid ca [facility]) 6/27/2013   • Thyroid cancer ([facility] [address]     localized, s/p resec PAIN MANAGEMENT  abt 1986: LARYNGOSCOPY,DIRCT,INJ VOCAL CORD      Comment: vocal cord polyps  4/1/2015: M-SEDAJ BY  PHYS [facility] 5+ YR N/A      Comment: Procedure: LUMBAR EPIDURAL;  Surgeon:                Amparo Hernandez MD;  Location: D An[facility]. Disp:  Rfl:  5/14/2018 at 0700   DULoxetine HCl 30 MG Oral Cap DR Particles Take 90 mg by mouth daily.  Disp:  Rfl:  5/13/2018   glimepiride 4 MG Oral Tab Take 4 mg by mouth every morning before breakfast. Disp:  Rfl:  5/14/2018 at 0700   Levothyro MG Oral Tab TAKE 1 TABLET EVERY NIGHT Disp: 90 tablet Rfl: 3    Tiotropium Bromide Monohydrate 18 MCG Inhalation Cap Inhale 18 mcg into the lungs daily.  Disp:  Rfl:  5/7/2018   ofloxacin 0.3 % Ophthalmic Solution 1 drop 4 times a day to operated eye starti tablet (10 mg total) by mouth nightly. Disp: 90 tablet Rfl: 3   [DISCONTINUED] MIRTAZAPINE 15 MG Oral Tab TAKE 1 TABLET EVERY NIGHT Disp: 90 tablet Rfl: 3   [DISCONTINUED] Zolpidem Tartrate 10 MG Oral Tab Take 0.5 tablets (5 mg total) by mouth nightly.  Dis [Meloxicam]       HIVES  Morphine                HIVES  Adhesive Tape               Comment:RED SKIN AND BLISTERS  Compazine               HALLUCINATION  Lipitor [Atorvastat*    PAIN    Comment:LEG PAIN  Latex                   RASH  Hydrocodone

## 2018-05-15 NOTE — PAYOR COMM NOTE
--------------  ADMISSION REVIEW     Payor: [employer]  Subscriber #:  [id number]  Authorization Number: [id number]    Admit date: N/A  Admit time: N/A       Admitting Physician: Tomás Pedersen MD  Attending Physician:  Tomás Pedersen MD  Primary Care 5/14/2018 1155 New Bag 3 g Intravenous (Left Hand) Amy Farmer MD      CeFAZolin Sodium (ANCEF) 3 g in sodium chloride 0.9% 100 mL IVPB     Date Action Dose Route User    5/15/2018 0547 New Bag 3 g Intravenous Laura Davis RN    5/14/2018 2037 Upper Arm) Williams Ashton RN      dextrose 5 % and 0.45 % NaCl with KCl 20 mEq infusion     Date Action Dose Route User    5/15/2018 0035 New [address] (none) Intravenous Mela Friend RN    0/96/5534 1413 New Bag (none) Intravenous Logan Gould RN PO  -bmp in am     Chronic diastolic CHF   -resume lasix ASAP once tolerating PO as bp will allow     COPD  -cont breo & incruse     Hypothyroidism s/p thyroidectomy; hypothyroidism  -cont synthroid     GERD  -PPI     Antiphospholipid antibody syndrome  -r

## 2018-05-15 NOTE — CM/SW NOTE
05/15/18 1400   CM/SW Referral Data   Referral Source Social Work (self-referral)   Reason for Referral Discharge planning   Informant Patient   Pertinent Medical Hx   Primary Care Physician Name [facility]   Patient Info   Patient's Mental Status Alert;[place]

## 2018-05-15 NOTE — PLAN OF CARE
CARDIOVASCULAR - ADULT    • Maintains optimal cardiac output and hemodynamic stability Progressing        Patient/Family Goals    • Patient/Family Long Term Goal Progressing        RESPIRATORY - ADULT    • Achieves optimal ventilation and oxygenation Progr

## 2018-05-15 NOTE — HOME CARE LIAISON
MET WITH PTNT TO DISCUSS HOME HEALTH SERVICES AND COVERAGE CRITERIA. PTNT AGREEABLE TO Som Mckeon. PTNT GIVEN RESIDENTIAL BROCHURE. RESIDENTIAL WITH PROVIDE SN/PT ON DISCHARGE.     Thank you for this referral,   Jorge Momin

## 2018-05-15 NOTE — CM/SW NOTE
Updates sent to [address] [place], [place] [address]cy Yusef is pending improvement of hypoxia     Elvira Abrams RN, [facility]    [phone] pgr: [phone]

## 2018-05-15 NOTE — PROGRESS NOTES
Patient able to void 300 ml of kacey color urine. Bladder scan shows 265 ml of retention. Notified Dr. Jaclyn Baldwin and hold off on garcia catheter at this point. Patient complains of \"dust spots\" that move, but she can see through.   The dust spots come and

## 2018-05-15 NOTE — RESPIRATORY THERAPY NOTE
ASHLEY - Equipment Use Daily Summary:                  . Set Mode: AUTO CPAP WITH C-FLEX                . Usage in hours: 0:53                . 90% Pressure (EPAP) level: 13.9                . 90% Insp. Pressure (IPAP): [facility]  AHI: 8.9

## 2018-05-16 VITALS
RESPIRATION RATE: 18 BRPM | TEMPERATURE: 98 F | SYSTOLIC BLOOD PRESSURE: 108 MMHG | BODY MASS INDEX: 49.9 KG/M2 | OXYGEN SATURATION: 90 % | DIASTOLIC BLOOD PRESSURE: 58 MMHG | HEART RATE: 80 BPM | HEIGHT: 62 IN | WEIGHT: 271.19 LBS

## 2018-05-16 PROCEDURE — 82962 GLUCOSE BLOOD TEST: CPT

## 2018-05-16 PROCEDURE — 85025 COMPLETE CBC W/AUTO DIFF WBC: CPT | Performed by: HOSPITALIST

## 2018-05-16 RX ORDER — HYDROCODONE BITARTRATE AND ACETAMINOPHEN 5; 325 MG/1; MG/1
1 TABLET ORAL EVERY 4 HOURS PRN
Qty: 20 TABLET | Refills: 0 | Status: SHIPPED | OUTPATIENT
Start: 2018-05-16 | End: 2018-05-26

## 2018-05-16 NOTE — PROGRESS NOTES
Patient discharged home at this time. All discharge instructions reviewed with the patient. All questions and concerns were addressed. Prescriptions given to patient by pharmacy laya Cabezas. IV access removed.  Patient left unit in stable condition via wh

## 2018-05-16 NOTE — RESPIRATORY THERAPY NOTE
ASHLEY Equipment Usage Summary :            Set Mode :BILEVEL          Usage in Hours:3;38          90% Pressure (EPAP) : 11           90% Insp Pressure (IPAP); 16            AHI :  20              Supplemental Oxygen :   2   LPM

## 2018-05-16 NOTE — PLAN OF CARE
CARDIOVASCULAR - ADULT    • Maintains optimal cardiac output and hemodynamic stability Progressing        Diabetes/Glucose Control    • Glucose maintained within prescribed range Progressing        GASTROINTESTINAL - ADULT    • Minimal or absence of nausea drainage noted at this time. Educated patient not to remove steri strips, they will fall off on their own, verbalized understanding. Will continue to monitor.

## 2018-05-16 NOTE — DISCHARGE SUMMARY
General Medicine Discharge Summary     Patient ID:  Ethan Lee  76year old  3/7/1950    Admit date: 5/14/2018    Discharge date and time: 5/16/18    Attending Physician: Gilbert Rivers MD     Primary Ca depression  -cymbalta     Insomnia  -lunesta at home  -seroquel     ASHLEY  -cpap protocol       Consults: IP CONSULT TO HOSPITALIST  IP CONSULT TO SPIRITUAL CARE  IP CONSULT TO SOCIAL WORK  IP CONSULT TO RESPIRATORY CARE  IP CONSULT TO PULMONOLOGY  IP CONSUL breakfast.    Levothyroxine Sodium 137 MCG Oral Tab  Take 137 mcg by mouth before breakfast.    Metoprolol Succinate ER 25 MG Oral Tablet 24 Hr  Take 25 mg by mouth daily.     omeprazole 20 MG Oral Capsule Delayed Release  Take 40 mg by mouth every morning

## 2018-05-16 NOTE — PROGRESS NOTES
[facility]  Progress Note    Mendy Yu Patient Status:  Observation    3/7/1950 MRN [id number]   [facility] 3NW-A Attending Itzel Isabel MD   Hosp Day # 0 PCP Taran Rowan MD     Subjective:    Patient tolerating soft diet, repor without obstruction or gangrene      Impression:     77 y/o S/P: Robotic-assisted repair of incarcerated complex ventral hernia with mesh with extensive lysis of adhesions  -pain controlled, tolerating PO diet    Plan:    Diet as tolerated  Reviewed postop

## 2018-05-16 NOTE — PROGRESS NOTES
Pulmonary Progress Note        NAME: Pal Del Rosario - ROOM: 320/320-A - MRN: [id number] - Age: 76year old - : 3/7/1950        SUBJECTIVE: No events overnight, still having pain related to her surgery, breathing is better    OBJECTIVE:   18  0022 0 edema      Recent Labs   05/15/18  0524 05/16/18  0513   WBC 17.2* 12.7   HGB 13.0 11.6*   MCV 84.0 83.5   .0 318.0         Recent Labs   05/15/18  0524      K 4.6      CO2 30.0   BUN 12   CA 9.6         Recent Labs   05/15/18  0524   AL

## 2018-05-28 ENCOUNTER — HOSPITAL ENCOUNTER (EMERGENCY)
Facility: HOSPITAL | Age: 68
Discharge: HOME | End: 2018-05-28
Attending: EMERGENCY MEDICINE
Payer: MEDICARE

## 2018-05-28 VITALS
RESPIRATION RATE: 18 BRPM | DIASTOLIC BLOOD PRESSURE: 76 MMHG | HEART RATE: 76 BPM | WEIGHT: 271.19 LBS | TEMPERATURE: 98 F | SYSTOLIC BLOOD PRESSURE: 132 MMHG | HEIGHT: 62 IN | OXYGEN SATURATION: 99 % | BODY MASS INDEX: 49.9 KG/M2

## 2018-05-28 DIAGNOSIS — T78.3XXA ANGIOEDEMA, INITIAL ENCOUNTER: Primary | ICD-10-CM

## 2018-05-28 PROCEDURE — 96375 TX/PRO/DX INJ NEW DRUG ADDON: CPT

## 2018-05-28 PROCEDURE — 99284 EMERGENCY DEPT VISIT MOD MDM: CPT

## 2018-05-28 PROCEDURE — 96374 THER/PROPH/DIAG INJ IV PUSH: CPT

## 2018-05-28 PROCEDURE — S0028 INJECTION, FAMOTIDINE, 20 MG: HCPCS | Performed by: EMERGENCY MEDICINE

## 2018-05-28 RX ORDER — PREDNISONE 20 MG/1
40 TABLET ORAL DAILY
Qty: 10 TABLET | Refills: 0 | Status: SHIPPED | OUTPATIENT
Start: 2018-05-28 | End: 2018-05-29 | Stop reason: ALTCHOICE

## 2018-05-28 RX ORDER — FAMOTIDINE 10 MG/ML
20 INJECTION, SOLUTION INTRAVENOUS ONCE
Status: COMPLETED | OUTPATIENT
Start: 2018-05-28 | End: 2018-05-28

## 2018-05-28 RX ORDER — METHYLPREDNISOLONE SODIUM SUCCINATE 125 MG/2ML
125 INJECTION, POWDER, LYOPHILIZED, FOR SOLUTION INTRAMUSCULAR; INTRAVENOUS ONCE
Status: COMPLETED | OUTPATIENT
Start: 2018-05-28 | End: 2018-05-28

## 2018-05-28 RX ORDER — DIPHENHYDRAMINE HYDROCHLORIDE 50 MG/ML
25 INJECTION INTRAMUSCULAR; INTRAVENOUS ONCE
Status: COMPLETED | OUTPATIENT
Start: 2018-05-28 | End: 2018-05-28

## 2018-08-01 PROCEDURE — 84432 ASSAY OF THYROGLOBULIN: CPT | Performed by: NURSE PRACTITIONER

## 2018-08-01 PROCEDURE — 82043 UR ALBUMIN QUANTITATIVE: CPT | Performed by: NURSE PRACTITIONER

## 2018-08-01 PROCEDURE — 86800 THYROGLOBULIN ANTIBODY: CPT | Performed by: NURSE PRACTITIONER

## 2018-08-01 PROCEDURE — 82570 ASSAY OF URINE CREATININE: CPT | Performed by: NURSE PRACTITIONER

## 2018-08-02 NOTE — CONSULTS
[facility]  Report of Consultation    Ron Factor Patient Status:  Observation    3/7/1950 MRN [id number]   [facility] 8NE-A Attending Karolina Adames MD   Hosp Day # 0 PCP Jamar Pat MD     Reason for Consu Scheduled:   • predniSONE  20 mg Oral Daily with breakfast   • atenolol  50 mg Oral Daily   • Fluticasone Furoate-Vilanterol  1 puff Inhalation Daily   • DULoxetine HCl  60 mg Oral Daily   • Fluticasone Propionate  2 spray Nasal Daily   • Levothyroxine S total) by mouth nightly. Disp: 90 tablet Rfl: 1   simvastatin 10 MG Oral Tab Take 1 tablet (10 mg total) by mouth nightly. Disp: 90 tablet Rfl: 3   Fluticasone Propionate 50 MCG/ACT Nasal Suspension 2 sprays by Nasal route daily.  Disp: 1 Bottle Rfl: 3   Ti into the lungs daily. Disp: 3 Inhaler Rfl: 3   [DISCONTINUED] Tiotropium Bromide Monohydrate 18 MCG Inhalation Cap Inhale 1 capsule (18 mcg total) into the lungs daily.  Disp: 90 capsule Rfl: 3       Allergies:     Codeine                 Lisa Melgar appreciated.       Laboratories and Data:   Labs:    Recent Labs   Lab  06/27/17   1339   GLU  160*   BUN  11   CREATSERUM  0.90   CA  9.8   ALB  2.9*   NA  138   K  4.0   CL  101   CO2  34.0*   ALKPHO  128   AST  18   ALT  17   BILT  0.4   TP  8.0       Re

## 2018-11-12 PROBLEM — J22 LRTI (LOWER RESPIRATORY TRACT INFECTION): Status: ACTIVE | Noted: 2018-11-12

## 2019-01-24 ENCOUNTER — APPOINTMENT (OUTPATIENT)
Dept: GENERAL RADIOLOGY | Facility: HOSPITAL | Age: 69
DRG: 189 | End: 2019-01-24
Attending: EMERGENCY MEDICINE
Payer: MEDICARE

## 2019-01-24 ENCOUNTER — APPOINTMENT (OUTPATIENT)
Dept: GENERAL RADIOLOGY | Facility: HOSPITAL | Age: 69
DRG: 189 | End: 2019-01-24
Payer: MEDICARE

## 2019-01-24 ENCOUNTER — HOSPITAL ENCOUNTER (INPATIENT)
Facility: HOSPITAL | Age: 69
LOS: 2 days | Discharge: HOME | DRG: 189 | End: 2019-01-29
Attending: EMERGENCY MEDICINE | Admitting: HOSPITALIST
Payer: MEDICARE

## 2019-01-24 DIAGNOSIS — J44.0 ACUTE BRONCHITIS WITH COPD (HCC): Primary | ICD-10-CM

## 2019-01-24 DIAGNOSIS — J20.9 ACUTE BRONCHITIS WITH COPD (HCC): Primary | ICD-10-CM

## 2019-01-24 LAB
ADENOVIRUS PCR:: NEGATIVE
ALBUMIN SERPL-MCNC: 3.5 G/DL (ref 3.1–4.5)
ALBUMIN/GLOB SERPL: 0.7 {RATIO} (ref 1–2)
ALP LIVER SERPL-CCNC: 133 U/L (ref 55–142)
ALT SERPL-CCNC: 26 U/L (ref 14–54)
ANION GAP SERPL CALC-SCNC: 6 MMOL/L (ref 0–18)
AST SERPL-CCNC: 40 U/L (ref 15–41)
B PERT DNA SPEC QL NAA+PROBE: NEGATIVE
BASOPHILS # BLD AUTO: 0.07 X10(3) UL (ref 0–0.1)
BASOPHILS NFR BLD AUTO: 0.6 %
BILIRUB SERPL-MCNC: 0.5 MG/DL (ref 0.1–2)
BUN BLD-MCNC: 19 MG/DL (ref 8–20)
BUN/CREAT SERPL: 17.3 (ref 10–20)
C PNEUM DNA SPEC QL NAA+PROBE: NEGATIVE
CALCIUM BLD-MCNC: 9.9 MG/DL (ref 8.3–10.3)
CHLORIDE SERPL-SCNC: 101 MMOL/L (ref 101–111)
CO2 SERPL-SCNC: 33 MMOL/L (ref 22–32)
CORONAVIRUS 229E PCR:: NEGATIVE
CORONAVIRUS HKU1 PCR:: NEGATIVE
CORONAVIRUS NL63 PCR:: NEGATIVE
CORONAVIRUS OC43 PCR:: NEGATIVE
CREAT BLD-MCNC: 1.1 MG/DL (ref 0.55–1.02)
EOSINOPHIL # BLD AUTO: 0.24 X10(3) UL (ref 0–0.3)
EOSINOPHIL NFR BLD AUTO: 2.2 %
ERYTHROCYTE [DISTWIDTH] IN BLOOD BY AUTOMATED COUNT: 17.2 % (ref 11.5–16)
FLUAV RNA SPEC QL NAA+PROBE: NEGATIVE
FLUBV RNA SPEC QL NAA+PROBE: NEGATIVE
GLOBULIN PLAS-MCNC: 5 G/DL (ref 2.8–4.4)
GLUCOSE BLD-MCNC: 116 MG/DL (ref 65–99)
GLUCOSE BLD-MCNC: 99 MG/DL (ref 70–99)
HCT VFR BLD AUTO: 42.9 % (ref 34–50)
HGB BLD-MCNC: 13.4 G/DL (ref 12–16)
IMMATURE GRANULOCYTE COUNT: 0.11 X10(3) UL (ref 0–1)
IMMATURE GRANULOCYTE RATIO %: 1 %
LYMPHOCYTES # BLD AUTO: 1.93 X10(3) UL (ref 0.9–4)
LYMPHOCYTES NFR BLD AUTO: 17.9 %
M PROTEIN MFR SERPL ELPH: 8.5 G/DL (ref 6.4–8.2)
MCH RBC QN AUTO: 25.4 PG (ref 27–33.2)
MCHC RBC AUTO-ENTMCNC: 31.2 G/DL (ref 31–37)
MCV RBC AUTO: 81.3 FL (ref 81–100)
METAPNEUMOVIRUS PCR:: NEGATIVE
MONOCYTES # BLD AUTO: 1.44 X10(3) UL (ref 0.1–1)
MONOCYTES NFR BLD AUTO: 13.4 %
MYCOPLASMA PNEUMONIA PCR:: NEGATIVE
NEUTROPHIL ABS PRELIM: 6.99 X10 (3) UL (ref 1.3–6.7)
NEUTROPHILS # BLD AUTO: 6.99 X10(3) UL (ref 1.3–6.7)
NEUTROPHILS NFR BLD AUTO: 64.9 %
OSMOLALITY SERPL CALC.SUM OF ELEC: 292 MOSM/KG (ref 275–295)
PARAINFLUENZA 1 PCR:: NEGATIVE
PARAINFLUENZA 2 PCR:: NEGATIVE
PARAINFLUENZA 3 PCR:: NEGATIVE
PARAINFLUENZA 4 PCR:: NEGATIVE
PLATELET # BLD AUTO: 322 10(3)UL (ref 150–450)
POTASSIUM SERPL-SCNC: 4.5 MMOL/L (ref 3.6–5.1)
RBC # BLD AUTO: 5.28 X10(6)UL (ref 3.8–5.1)
RED CELL DISTRIBUTION WIDTH-SD: 50.1 FL (ref 35.1–46.3)
RHINOVIRUS/ENTERO PCR:: NEGATIVE
RSV RNA SPEC QL NAA+PROBE: NEGATIVE
SODIUM SERPL-SCNC: 140 MMOL/L (ref 136–144)
TROPONIN I SERPL-MCNC: <0.046 NG/ML (ref ?–0.05)
WBC # BLD AUTO: 10.8 X10(3) UL (ref 4–13)

## 2019-01-24 PROCEDURE — 99285 EMERGENCY DEPT VISIT HI MDM: CPT

## 2019-01-24 PROCEDURE — 85025 COMPLETE CBC W/AUTO DIFF WBC: CPT | Performed by: EMERGENCY MEDICINE

## 2019-01-24 PROCEDURE — 87798 DETECT AGENT NOS DNA AMP: CPT | Performed by: EMERGENCY MEDICINE

## 2019-01-24 PROCEDURE — 94640 AIRWAY INHALATION TREATMENT: CPT

## 2019-01-24 PROCEDURE — 96375 TX/PRO/DX INJ NEW DRUG ADDON: CPT

## 2019-01-24 PROCEDURE — 73560 X-RAY EXAM OF KNEE 1 OR 2: CPT | Performed by: EMERGENCY MEDICINE

## 2019-01-24 PROCEDURE — 83036 HEMOGLOBIN GLYCOSYLATED A1C: CPT | Performed by: HOSPITALIST

## 2019-01-24 PROCEDURE — 93005 ELECTROCARDIOGRAM TRACING: CPT

## 2019-01-24 PROCEDURE — 96366 THER/PROPH/DIAG IV INF ADDON: CPT

## 2019-01-24 PROCEDURE — 80053 COMPREHEN METABOLIC PANEL: CPT

## 2019-01-24 PROCEDURE — 84484 ASSAY OF TROPONIN QUANT: CPT | Performed by: EMERGENCY MEDICINE

## 2019-01-24 PROCEDURE — 71046 X-RAY EXAM CHEST 2 VIEWS: CPT | Performed by: EMERGENCY MEDICINE

## 2019-01-24 PROCEDURE — 93010 ELECTROCARDIOGRAM REPORT: CPT

## 2019-01-24 PROCEDURE — 96365 THER/PROPH/DIAG IV INF INIT: CPT

## 2019-01-24 PROCEDURE — 87633 RESP VIRUS 12-25 TARGETS: CPT | Performed by: EMERGENCY MEDICINE

## 2019-01-24 PROCEDURE — 71045 X-RAY EXAM CHEST 1 VIEW: CPT | Performed by: EMERGENCY MEDICINE

## 2019-01-24 PROCEDURE — 84484 ASSAY OF TROPONIN QUANT: CPT

## 2019-01-24 PROCEDURE — 80053 COMPREHEN METABOLIC PANEL: CPT | Performed by: EMERGENCY MEDICINE

## 2019-01-24 PROCEDURE — 85025 COMPLETE CBC W/AUTO DIFF WBC: CPT

## 2019-01-24 PROCEDURE — 87999 UNLISTED MICROBIOLOGY PX: CPT

## 2019-01-24 PROCEDURE — 87581 M.PNEUMON DNA AMP PROBE: CPT | Performed by: EMERGENCY MEDICINE

## 2019-01-24 PROCEDURE — 87486 CHLMYD PNEUM DNA AMP PROBE: CPT | Performed by: EMERGENCY MEDICINE

## 2019-01-24 PROCEDURE — 82962 GLUCOSE BLOOD TEST: CPT

## 2019-01-24 RX ORDER — METHYLPREDNISOLONE SODIUM SUCCINATE 125 MG/2ML
125 INJECTION, POWDER, LYOPHILIZED, FOR SOLUTION INTRAMUSCULAR; INTRAVENOUS ONCE
Status: COMPLETED | OUTPATIENT
Start: 2019-01-24 | End: 2019-01-24

## 2019-01-24 RX ORDER — METOPROLOL SUCCINATE 25 MG/1
25 TABLET, EXTENDED RELEASE ORAL
Status: DISCONTINUED | OUTPATIENT
Start: 2019-01-25 | End: 2019-01-29

## 2019-01-24 RX ORDER — ASPIRIN 81 MG/1
324 TABLET, CHEWABLE ORAL ONCE
Status: COMPLETED | OUTPATIENT
Start: 2019-01-24 | End: 2019-01-24

## 2019-01-24 RX ORDER — PENICILLIN V POTASSIUM 250 MG/1
250 TABLET ORAL 4 TIMES DAILY
Status: DISCONTINUED | OUTPATIENT
Start: 2019-01-24 | End: 2019-01-25

## 2019-01-24 RX ORDER — FLUTICASONE PROPIONATE 50 MCG
2 SPRAY, SUSPENSION (ML) NASAL DAILY
Status: DISCONTINUED | OUTPATIENT
Start: 2019-01-24 | End: 2019-01-29

## 2019-01-24 RX ORDER — SIMVASTATIN 10 MG
10 TABLET ORAL NIGHTLY
Status: DISCONTINUED | OUTPATIENT
Start: 2019-01-24 | End: 2019-01-29

## 2019-01-24 RX ORDER — PANTOPRAZOLE SODIUM 40 MG/1
40 TABLET, DELAYED RELEASE ORAL
Status: DISCONTINUED | OUTPATIENT
Start: 2019-01-25 | End: 2019-01-29

## 2019-01-24 RX ORDER — DULOXETIN HYDROCHLORIDE 30 MG/1
90 CAPSULE, DELAYED RELEASE ORAL DAILY
Status: DISCONTINUED | OUTPATIENT
Start: 2019-01-24 | End: 2019-01-29

## 2019-01-24 RX ORDER — ASPIRIN 81 MG/1
81 TABLET ORAL DAILY
Status: DISCONTINUED | OUTPATIENT
Start: 2019-01-24 | End: 2019-01-29

## 2019-01-24 RX ORDER — ALBUTEROL SULFATE 2.5 MG/3ML
2.5 SOLUTION RESPIRATORY (INHALATION) ONCE
Status: COMPLETED | OUTPATIENT
Start: 2019-01-24 | End: 2019-01-24

## 2019-01-24 RX ORDER — ALBUTEROL SULFATE 90 UG/1
1 AEROSOL, METERED RESPIRATORY (INHALATION) EVERY 6 HOURS PRN
Status: DISCONTINUED | OUTPATIENT
Start: 2019-01-24 | End: 2019-01-29

## 2019-01-24 RX ORDER — DEXTROSE MONOHYDRATE 25 G/50ML
50 INJECTION, SOLUTION INTRAVENOUS
Status: DISCONTINUED | OUTPATIENT
Start: 2019-01-24 | End: 2019-01-29

## 2019-01-24 RX ORDER — ALBUTEROL SULFATE 2.5 MG/3ML
5 SOLUTION RESPIRATORY (INHALATION) ONCE
Status: COMPLETED | OUTPATIENT
Start: 2019-01-24 | End: 2019-01-24

## 2019-01-24 RX ORDER — METHYLPREDNISOLONE SODIUM SUCCINATE 125 MG/2ML
60 INJECTION, POWDER, LYOPHILIZED, FOR SOLUTION INTRAMUSCULAR; INTRAVENOUS EVERY 8 HOURS
Status: DISCONTINUED | OUTPATIENT
Start: 2019-01-26 | End: 2019-01-25

## 2019-01-24 RX ORDER — IPRATROPIUM BROMIDE AND ALBUTEROL SULFATE 2.5; .5 MG/3ML; MG/3ML
3 SOLUTION RESPIRATORY (INHALATION)
Status: DISCONTINUED | OUTPATIENT
Start: 2019-01-25 | End: 2019-01-28

## 2019-01-24 RX ORDER — GABAPENTIN 100 MG/1
100 CAPSULE ORAL DAILY
Status: DISCONTINUED | OUTPATIENT
Start: 2019-01-24 | End: 2019-01-29

## 2019-01-25 ENCOUNTER — APPOINTMENT (OUTPATIENT)
Dept: CT IMAGING | Facility: HOSPITAL | Age: 69
DRG: 189 | End: 2019-01-25
Attending: HOSPITALIST
Payer: MEDICARE

## 2019-01-25 LAB
ATRIAL RATE: 78 BPM
BILIRUB UR QL STRIP.AUTO: NEGATIVE
CLARITY UR REFRACT.AUTO: CLEAR
COLOR UR AUTO: YELLOW
EST. AVERAGE GLUCOSE BLD GHB EST-MCNC: 157 MG/DL (ref 68–126)
GLUCOSE BLD-MCNC: 182 MG/DL (ref 65–99)
GLUCOSE BLD-MCNC: 184 MG/DL (ref 65–99)
GLUCOSE BLD-MCNC: 184 MG/DL (ref 65–99)
GLUCOSE BLD-MCNC: 305 MG/DL (ref 65–99)
GLUCOSE UR STRIP.AUTO-MCNC: >=500 MG/DL
HBA1C MFR BLD HPLC: 7.1 % (ref ?–5.7)
KETONES UR STRIP.AUTO-MCNC: NEGATIVE MG/DL
LEUKOCYTE ESTERASE UR QL STRIP.AUTO: NEGATIVE
NITRITE UR QL STRIP.AUTO: NEGATIVE
P AXIS: 58 DEGREES
P-R INTERVAL: 140 MS
PH UR STRIP.AUTO: 5 [PH] (ref 4.5–8)
PROCALCITONIN SERPL-MCNC: <0.11 NG/ML
PROT UR STRIP.AUTO-MCNC: NEGATIVE MG/DL
Q-T INTERVAL: 372 MS
QRS DURATION: 90 MS
QTC CALCULATION (BEZET): 424 MS
R AXIS: -29 DEGREES
RBC UR QL AUTO: NEGATIVE
SP GR UR STRIP.AUTO: 1.02 (ref 1–1.03)
T AXIS: 38 DEGREES
UROBILINOGEN UR STRIP.AUTO-MCNC: <2 MG/DL
VENTRICULAR RATE: 78 BPM

## 2019-01-25 PROCEDURE — 84145 PROCALCITONIN (PCT): CPT | Performed by: INTERNAL MEDICINE

## 2019-01-25 PROCEDURE — 94660 CPAP INITIATION&MGMT: CPT

## 2019-01-25 PROCEDURE — 5A09457 ASSISTANCE WITH RESPIRATORY VENTILATION, 24-96 CONSECUTIVE HOURS, CONTINUOUS POSITIVE AIRWAY PRESSURE: ICD-10-PCS | Performed by: INTERNAL MEDICINE

## 2019-01-25 PROCEDURE — 82962 GLUCOSE BLOOD TEST: CPT

## 2019-01-25 PROCEDURE — 94640 AIRWAY INHALATION TREATMENT: CPT

## 2019-01-25 PROCEDURE — 81003 URINALYSIS AUTO W/O SCOPE: CPT | Performed by: HOSPITALIST

## 2019-01-25 PROCEDURE — 94667 MNPJ CHEST WALL 1ST: CPT

## 2019-01-25 PROCEDURE — 70450 CT HEAD/BRAIN W/O DYE: CPT | Performed by: HOSPITALIST

## 2019-01-25 PROCEDURE — 94664 DEMO&/EVAL PT USE INHALER: CPT

## 2019-01-25 RX ORDER — HEPARIN SODIUM 5000 [USP'U]/ML
7500 INJECTION, SOLUTION INTRAVENOUS; SUBCUTANEOUS EVERY 8 HOURS SCHEDULED
Status: DISCONTINUED | OUTPATIENT
Start: 2019-01-25 | End: 2019-01-28

## 2019-01-25 RX ORDER — METHYLPREDNISOLONE SODIUM SUCCINATE 125 MG/2ML
60 INJECTION, POWDER, LYOPHILIZED, FOR SOLUTION INTRAMUSCULAR; INTRAVENOUS EVERY 8 HOURS
Status: DISCONTINUED | OUTPATIENT
Start: 2019-01-25 | End: 2019-01-28

## 2019-01-25 RX ORDER — POLYETHYLENE GLYCOL 3350 17 G/17G
17 POWDER, FOR SOLUTION ORAL DAILY PRN
Status: DISCONTINUED | OUTPATIENT
Start: 2019-01-25 | End: 2019-01-29

## 2019-01-25 RX ORDER — ACETAMINOPHEN 325 MG/1
650 TABLET ORAL EVERY 6 HOURS PRN
Status: DISCONTINUED | OUTPATIENT
Start: 2019-01-25 | End: 2019-01-29

## 2019-01-25 RX ORDER — DIAZEPAM 5 MG/1
10 TABLET ORAL NIGHTLY PRN
Status: DISCONTINUED | OUTPATIENT
Start: 2019-01-25 | End: 2019-01-28

## 2019-01-25 RX ORDER — DOCUSATE SODIUM 100 MG/1
100 CAPSULE, LIQUID FILLED ORAL 2 TIMES DAILY
Status: DISCONTINUED | OUTPATIENT
Start: 2019-01-25 | End: 2019-01-29

## 2019-01-25 NOTE — PROGRESS NOTES
[facility] Progress Note:  Anticoagulation Weight Dose Adjustment for heparin    Tennille Mclain is a 76year old female who has been prescribed heparin 5000 units every 8 hrs for VTE prophylaxis.       Estimated Creatinine Clearance: 38.7 mL/min (A) (base

## 2019-01-25 NOTE — PLAN OF CARE
RESPIRATORY - ADULT    • Achieves optimal ventilation and oxygenation Progressing        DX: COPD  PLAN OF CARE: OXYGEN THERAPY, TELE, NEBS, IV ANTBX/STEROIDS, CT BRAIN FOR FALL YESTERDAY  PT COMMUNICATES UNDERSTANDING, NO PAIN. EXPIRATORY WHEEZE/RHONCHI. discharge plan: N/A  HIGH RISK FOR READMISSION

## 2019-01-25 NOTE — CM/SW NOTE
01/25/19 1500   CM/SW Screening   Referral Source    Information Source Chart review;Nursing rounds   Patient's Mental Status Alert;Oriented   Patient's [address]   Patient lives with (family)   Patient Status Prior to Admission

## 2019-01-25 NOTE — H&P
[facility] Hospitalist H&P       CC: sob, wheezing    PCP: Annabelle Hinkle MD    History of Present Illness: Pt is a 77 yo with mmp including but not limited to chronic respiratory failure secondary to COPD, pHTN d/t copd, obesity hypoventilation sydrome, ch resection   • Visual impairment     GLASSES        PSH  Past Surgical History:   Procedure Laterality Date   • APPENDECTOMY     • BENIGN BIOPSY LEFT     • BIOPSY OF BREAST, INCISIONAL      multiple, all benign   • BIOPSY OF BREAST, INCISIONAL  7/9/2007 hemithyroidectomy; in [place]   • THYROIDECTOMY Right 6/11/2013    Performed by Rebecca Martinez MD at [facility] MAIN OR   • TONSILLECTOMY     • UPPER GI ENDOSCOPY,BIOPSY  8/11/2009    findings: hiatal hernia, mild gastritis; bx results unknown; by Dr Skye Oneill Inhalation Aero Soln Inhale 1 puff into the lungs every 6 (six) hours as needed for Wheezing. Disp:  Rfl:    acetaminophen 500 MG Oral Tab Take 1,000 mg by mouth one time. Disp:  Rfl:    aspirin 81 MG Oral Tab EC Take 81 mg by mouth daily.  Disp:  Rfl:    C pain, syncope.        OBJECTIVE:  /54 (BP Location: Left arm)   Pulse 81   Temp 98 °F (36.7 °C) (Oral)   Resp 19   Ht 157.5 cm (5' 2\")   Wt 260 lb 11.2 oz (118.3 kg)   SpO2 97%   BMI 47.68 kg/m²   General:  Alert, NAD, appears stated age, on 2L NC by: David Soliz MD on 1/24/2019 at 21:28     Approved by: David Soliz MD            Xr Knee (1 Or 2 Views), Right (cpt=73560)    Result Date: 1/24/2019  PROCEDURE:  XR KNEE (1 OR 2 VIEWS), RIGHT (CPT=73560)  COMPARISON:  None.   INDICATIONS:  chest azithro  -EKG and trop unremarkable    **pHTN, obesity hypoventilation syndrome- encourage weight loss. Follows with [facility] and pulmonary as outpatient    **acute renal insufficiency, mild  -hold ACEI for now (pt on lisinopril as outpatient?   ACEI listed as a

## 2019-01-25 NOTE — CONSULTS
Pulmonary / Critical Care H&P/Consult       NAME: Hiral 78: [id number]/507-[id number] - MRN: [id number] - Age: 76year old - :  3/7/1950    Date of Admission: 2019  6:39 PM  Admission Diagnosis: Acute bronchitis with COPD ([facility] [J44.0, J20.9]    Reason Procedure Laterality Date   • APPENDECTOMY     • BENIGN BIOPSY LEFT     • BIOPSY OF BREAST, INCISIONAL      multiple, all benign   • BIOPSY OF BREAST, INCISIONAL  7/9/2007    findings: benign; by Dr Servando Armijo in [address] [place] [address] N/A 2/18/2016 Price Mata MD at [facility] MAIN OR   • TONSILLECTOMY     • UPPER GI ENDOSCOPY,BIOPSY  8/11/2009    findings: hiatal hernia, mild gastritis; bx results unknown; by Dr Divine Pinto   • Dima Quezada N/A 5/14/2018    Performed by • Hypertension Mother    • Lipids Mother    • Obesity Mother    • Psychiatric Mother         Home Medications:    Outpatient Medications Marked as Taking for the 1/24/19 encounter [facility] Encounter):  Amoxicillin-Pot Clavulanate (AUGMENTIN) 875-125 MG every Monday. Disp:  Rfl:    Fluticasone Furoate-Vilanterol (BREO ELLIPTA) 200-25 MCG/INH Inhalation Aerosol Powder, Breath Activated Inhale 1 puff into the lungs daily.  Disp:  Rfl:    simvastatin 10 MG Oral Tab Take 1 tablet (10 mg total) by mouth nightly O2: 2Lnc              Wt Readings from Last 3 Encounters:  01/24/19 : 260 lb 11.2 oz (118.3 kg)  11/12/18 : 262 lb (118.8 kg)  11/09/18 : 257 lb (116.6 kg)      No intake or output data in the 24 hours ending 01/25/19 1117    /54 (BP Location: Bibi Pillai Does have home O2 though using more prn  - pulm toilet  - check procalc. Can cont azithro x 3 doses  2. Copd: with exac  - iv steroids, scheduled bd.  - cont breo, incruse (for spiriva)   - azithro as above  3.  Mo: not compliant with bipap  - willing to r

## 2019-01-25 NOTE — HOME CARE LIAISON
Referral received from ALICIA Cheng to offer home health services when discharged home. Patient declined Residential at this time. Brochure and liaison card given to patient for any further questions or concerns.   Thanks for the referral.

## 2019-01-25 NOTE — PLAN OF CARE
RESPIRATORY - ADULT    • Achieves optimal ventilation and oxygenation Progressing        Pt is alert and oriented. O2 is currently WNL on 2L nc. NSR/ST on tele. No c/o CP or SOB. Up self. Oriented to room. Admission navigator completed.   Call light a

## 2019-01-25 NOTE — ED INITIAL ASSESSMENT (HPI)
Pt c/o mid sternal chest pain for the past couple days. Pt c/o SOB.  Pt did not take her aspirin today

## 2019-01-26 LAB
ANION GAP SERPL CALC-SCNC: 5 MMOL/L (ref 0–18)
BASOPHILS # BLD AUTO: 0.02 X10(3) UL (ref 0–0.1)
BASOPHILS NFR BLD AUTO: 0.2 %
BUN BLD-MCNC: 24 MG/DL (ref 8–20)
BUN/CREAT SERPL: 28.9 (ref 10–20)
CALCIUM BLD-MCNC: 10.6 MG/DL (ref 8.3–10.3)
CHLORIDE SERPL-SCNC: 101 MMOL/L (ref 101–111)
CO2 SERPL-SCNC: 32 MMOL/L (ref 22–32)
CREAT BLD-MCNC: 0.83 MG/DL (ref 0.55–1.02)
EOSINOPHIL # BLD AUTO: 0 X10(3) UL (ref 0–0.3)
EOSINOPHIL NFR BLD AUTO: 0 %
ERYTHROCYTE [DISTWIDTH] IN BLOOD BY AUTOMATED COUNT: 17 % (ref 11.5–16)
GLUCOSE BLD-MCNC: 182 MG/DL (ref 65–99)
GLUCOSE BLD-MCNC: 190 MG/DL (ref 65–99)
GLUCOSE BLD-MCNC: 190 MG/DL (ref 70–99)
GLUCOSE BLD-MCNC: 231 MG/DL (ref 65–99)
GLUCOSE BLD-MCNC: 271 MG/DL (ref 65–99)
HAV IGM SER QL: 2.4 MG/DL (ref 1.8–2.5)
HCT VFR BLD AUTO: 41.1 % (ref 34–50)
HGB BLD-MCNC: 12.1 G/DL (ref 12–16)
IMMATURE GRANULOCYTE COUNT: 0.13 X10(3) UL (ref 0–1)
IMMATURE GRANULOCYTE RATIO %: 1.1 %
LYMPHOCYTES # BLD AUTO: 0.97 X10(3) UL (ref 0.9–4)
LYMPHOCYTES NFR BLD AUTO: 8.4 %
MCH RBC QN AUTO: 24.2 PG (ref 27–33.2)
MCHC RBC AUTO-ENTMCNC: 29.4 G/DL (ref 31–37)
MCV RBC AUTO: 82.2 FL (ref 81–100)
MONOCYTES # BLD AUTO: 0.48 X10(3) UL (ref 0.1–1)
MONOCYTES NFR BLD AUTO: 4.1 %
NEUTROPHIL ABS PRELIM: 10.01 X10 (3) UL (ref 1.3–6.7)
NEUTROPHILS # BLD AUTO: 10.01 X10(3) UL (ref 1.3–6.7)
NEUTROPHILS NFR BLD AUTO: 86.2 %
OSMOLALITY SERPL CALC.SUM OF ELEC: 295 MOSM/KG (ref 275–295)
PLATELET # BLD AUTO: 331 10(3)UL (ref 150–450)
POTASSIUM SERPL-SCNC: 4.9 MMOL/L (ref 3.6–5.1)
RBC # BLD AUTO: 5 X10(6)UL (ref 3.8–5.1)
RED CELL DISTRIBUTION WIDTH-SD: 51 FL (ref 35.1–46.3)
SODIUM SERPL-SCNC: 138 MMOL/L (ref 136–144)
WBC # BLD AUTO: 11.6 X10(3) UL (ref 4–13)

## 2019-01-26 PROCEDURE — 85025 COMPLETE CBC W/AUTO DIFF WBC: CPT | Performed by: HOSPITALIST

## 2019-01-26 PROCEDURE — 80048 BASIC METABOLIC PNL TOTAL CA: CPT | Performed by: HOSPITALIST

## 2019-01-26 PROCEDURE — 84439 ASSAY OF FREE THYROXINE: CPT | Performed by: INTERNAL MEDICINE

## 2019-01-26 PROCEDURE — 96372 THER/PROPH/DIAG INJ SC/IM: CPT

## 2019-01-26 PROCEDURE — 84132 ASSAY OF SERUM POTASSIUM: CPT | Performed by: HOSPITALIST

## 2019-01-26 PROCEDURE — 94640 AIRWAY INHALATION TREATMENT: CPT

## 2019-01-26 PROCEDURE — 83735 ASSAY OF MAGNESIUM: CPT | Performed by: HOSPITALIST

## 2019-01-26 PROCEDURE — 82962 GLUCOSE BLOOD TEST: CPT

## 2019-01-26 PROCEDURE — 84443 ASSAY THYROID STIM HORMONE: CPT | Performed by: INTERNAL MEDICINE

## 2019-01-26 NOTE — PROGRESS NOTES
[facility] Hospitalist Progress Note                                                                   [address]  3/7/1950    SUBJECTIVE: pt doesn't feel well overall. Very tired and weak.  SOB Pantoprazole Sodium  40 mg Oral QAM AC   • simvastatin  10 mg Oral Nightly   • Umeclidinium Bromide  1 puff Inhalation Daily   • ipratropium-albuterol  3 mL Nebulization 6 times per day   • azithromycin  500 mg Intravenous Q24H   • Insulin Aspart Pen  1-5 PCP     **PPx-scds, heparin subq    [place] Hospitalist  [phone]

## 2019-01-26 NOTE — PROGRESS NOTES
[address] Patient Status:  Observation    3/7/1950 MRN [id number]   [facility] 5NW-A Attending Radha Yo, 160[address] [facility] Day # 0 PCP Dustin Garcia MD     SUBJECTIVE:pt still coughing. Overall a little better.  Elizabeth Blocker  •  Pantoprazole Sodium (PROTONIX) EC tab 40 mg, 40 mg, Oral, QAM AC  •  simvastatin (ZOCOR) tab 10 mg, 10 mg, Oral, Nightly  •  Umeclidinium Bromide (INCRUSE ELLIPTA) 62.5 MCG/INH inhaler 1 puff, 1 puff, Inhalation, Daily  •  ipratropium-albuterol copd exac. - Does have home O2 though using more prn  - pulm toilet  - PCT negative, RVP negative  - finish azithro x 3 doses and then stop  2. COPD: with exac  - iv steroids, scheduled bd.  - cont breo, incruse (for spiriva)   3.  ASHLEY: not compliant with

## 2019-01-26 NOTE — RESPIRATORY THERAPY NOTE
ASHLEY - Equipment Use Daily Summary:                  . Set Mode:BI-FLEX                . Usage in hours: 3:05                . 90% Pressure (EPAP) level:11                . 90% Insp. Pressure (IPAP): 16                . AHI: 0.6                .  Supplemental

## 2019-01-27 LAB
ATRIAL RATE: 150 BPM
GLUCOSE BLD-MCNC: 175 MG/DL (ref 65–99)
GLUCOSE BLD-MCNC: 175 MG/DL (ref 65–99)
GLUCOSE BLD-MCNC: 218 MG/DL (ref 65–99)
GLUCOSE BLD-MCNC: 235 MG/DL (ref 65–99)
Q-T INTERVAL: 324 MS
QRS DURATION: 92 MS
QTC CALCULATION (BEZET): 460 MS
R AXIS: -21 DEGREES
T AXIS: 70 DEGREES
T4 FREE SERPL-MCNC: 1 NG/DL (ref 0.9–1.8)
TSI SER-ACNC: 0.35 MIU/ML (ref 0.35–5.5)
VENTRICULAR RATE: 121 BPM

## 2019-01-27 PROCEDURE — 94668 MNPJ CHEST WALL SBSQ: CPT

## 2019-01-27 PROCEDURE — 82962 GLUCOSE BLOOD TEST: CPT

## 2019-01-27 PROCEDURE — 93010 ELECTROCARDIOGRAM REPORT: CPT | Performed by: INTERNAL MEDICINE

## 2019-01-27 PROCEDURE — 94664 DEMO&/EVAL PT USE INHALER: CPT

## 2019-01-27 PROCEDURE — 94640 AIRWAY INHALATION TREATMENT: CPT

## 2019-01-27 PROCEDURE — 96372 THER/PROPH/DIAG INJ SC/IM: CPT

## 2019-01-27 PROCEDURE — 93005 ELECTROCARDIOGRAM TRACING: CPT

## 2019-01-27 RX ORDER — PREDNISONE 20 MG/1
40 TABLET ORAL
Status: DISCONTINUED | OUTPATIENT
Start: 2019-01-28 | End: 2019-01-29

## 2019-01-27 RX ORDER — DILTIAZEM HYDROCHLORIDE 60 MG/1
60 TABLET, FILM COATED ORAL AS NEEDED
Status: DISCONTINUED | OUTPATIENT
Start: 2019-01-27 | End: 2019-01-29

## 2019-01-27 RX ORDER — PREDNISONE 10 MG/1
TABLET ORAL
Qty: 30 TABLET | Refills: 0 | Status: SHIPPED | OUTPATIENT
Start: 2019-01-27 | End: 2019-05-15

## 2019-01-27 NOTE — DISCHARGE SUMMARY
General Medicine Discharge Summary     Patient ID:  Eyad Montana  76year old  3/7/1950    Admit date: 1/24/2019    Discharge date and time: 1/27/19    Attending Physician: Felix Weinstein DO pulmonary as outpatient     **acute renal insufficiency - resolved  -hold ACEI for now (pt on lisinopril as outpatient? Angelica Redding listed as allergy-->resulting in angioedema--DC and f/u with PCP  - lasix resumed      **recent mechanical fall on ice  -CT Brain Furoate-Vilanterol      Inhale 1 puff into the lungs daily. Refills:  0     ClonazePAM 2 MG Tabs  Commonly known as:  KLONOPIN      Take 2 mg by mouth daily as needed for Anxiety.    Refills:  0     diazepam 10 MG Tabs  Commonly known as:  [address] Where to Get Your Medications      These medications were sent to [address] [place] [address] [place] - [address] [facility] ([place], [phone], [phone]  [facility] [address]    Phone:  [phone]

## 2019-01-27 NOTE — PROGRESS NOTES
[facility] Hospitalist Progress Note                                                                   [address]  3/7/1950    SUBJECTIVE: pt doing better today. Less SOB. Coughing still. Levothyroxine Sodium  137 mcg Oral Before breakfast   • Metoprolol Succinate ER  25 mg Oral Daily Beta Blocker   • Pantoprazole Sodium  40 mg Oral QAM AC   • simvastatin  10 mg Oral Nightly   • Umeclidinium Bromide  1 puff Inhalation Daily   • ipratropium- hypothyroidism, benzo dependence - continue home meds. For benzo dependence--has been 50 years- no longer sees psychiatry- [facility] [address]. On valium nightly prn     **morbid obesity with Body mass index is 47.68 kg/m².   -encourage weight loss, f/u with PCP     *

## 2019-01-27 NOTE — PROGRESS NOTES
[address] Patient Status:  Inpatient    3/7/1950 MRN [id number]   [facility] 5NW-A Attending Vernal Paget, DO   Hosp Day # 0 PCP Vladimir Kim MD     SUBJECTIVE:  Better. No wheezing.  Slight cough     OBJECTIVE: (PROTONIX) EC tab 40 mg, 40 mg, Oral, QAM AC  •  simvastatin (ZOCOR) tab 10 mg, 10 mg, Oral, Nightly  •  Umeclidinium Bromide (INCRUSE ELLIPTA) 62.5 MCG/INH inhaler 1 puff, 1 puff, Inhalation, Daily  •  ipratropium-albuterol (DUONEB) nebulizer solution 3 m asymptomatic afib with RVR which persisted at fast rate.  Confirmed with EKG  -cancel discharge  -cardiology consult for rate control and possible anticoagulation

## 2019-01-28 ENCOUNTER — APPOINTMENT (OUTPATIENT)
Dept: CV DIAGNOSTICS | Facility: HOSPITAL | Age: 69
DRG: 189 | End: 2019-01-28
Attending: INTERNAL MEDICINE
Payer: MEDICARE

## 2019-01-28 LAB
ANION GAP SERPL CALC-SCNC: 5 MMOL/L (ref 0–18)
APTT PPP: 28.7 SECONDS (ref 26.1–34.6)
APTT PPP: 56.4 SECONDS (ref 26.1–34.6)
BUN BLD-MCNC: 30 MG/DL (ref 8–20)
BUN/CREAT SERPL: 25.2 (ref 10–20)
CALCIUM BLD-MCNC: 10.3 MG/DL (ref 8.3–10.3)
CHLORIDE SERPL-SCNC: 97 MMOL/L (ref 101–111)
CO2 SERPL-SCNC: 33 MMOL/L (ref 22–32)
CREAT BLD-MCNC: 1.19 MG/DL (ref 0.55–1.02)
ERYTHROCYTE [DISTWIDTH] IN BLOOD BY AUTOMATED COUNT: 17.8 % (ref 11.5–16)
GLUCOSE BLD-MCNC: 183 MG/DL (ref 65–99)
GLUCOSE BLD-MCNC: 203 MG/DL (ref 65–99)
GLUCOSE BLD-MCNC: 224 MG/DL (ref 65–99)
GLUCOSE BLD-MCNC: 254 MG/DL (ref 65–99)
GLUCOSE BLD-MCNC: 258 MG/DL (ref 70–99)
HCT VFR BLD AUTO: 42.4 % (ref 34–50)
HGB BLD-MCNC: 13.3 G/DL (ref 12–16)
MCH RBC QN AUTO: 25.4 PG (ref 27–33.2)
MCHC RBC AUTO-ENTMCNC: 31.4 G/DL (ref 31–37)
MCV RBC AUTO: 80.9 FL (ref 81–100)
OSMOLALITY SERPL CALC.SUM OF ELEC: 295 MOSM/KG (ref 275–295)
PLATELET # BLD AUTO: 354 10(3)UL (ref 150–450)
POTASSIUM SERPL-SCNC: 4.4 MMOL/L (ref 3.6–5.1)
RBC # BLD AUTO: 5.24 X10(6)UL (ref 3.8–5.1)
RED CELL DISTRIBUTION WIDTH-SD: 50.3 FL (ref 35.1–46.3)
SODIUM SERPL-SCNC: 135 MMOL/L (ref 136–144)
WBC # BLD AUTO: 11.1 X10(3) UL (ref 4–13)

## 2019-01-28 PROCEDURE — 94664 DEMO&/EVAL PT USE INHALER: CPT

## 2019-01-28 PROCEDURE — 85730 THROMBOPLASTIN TIME PARTIAL: CPT | Performed by: INTERNAL MEDICINE

## 2019-01-28 PROCEDURE — 93306 TTE W/DOPPLER COMPLETE: CPT | Performed by: INTERNAL MEDICINE

## 2019-01-28 PROCEDURE — 85027 COMPLETE CBC AUTOMATED: CPT | Performed by: INTERNAL MEDICINE

## 2019-01-28 PROCEDURE — 94640 AIRWAY INHALATION TREATMENT: CPT

## 2019-01-28 PROCEDURE — 80048 BASIC METABOLIC PNL TOTAL CA: CPT | Performed by: INTERNAL MEDICINE

## 2019-01-28 PROCEDURE — 82962 GLUCOSE BLOOD TEST: CPT

## 2019-01-28 RX ORDER — DILTIAZEM HYDROCHLORIDE 240 MG/1
240 CAPSULE, COATED, EXTENDED RELEASE ORAL DAILY
Status: DISCONTINUED | OUTPATIENT
Start: 2019-01-28 | End: 2019-01-29

## 2019-01-28 RX ORDER — IPRATROPIUM BROMIDE AND ALBUTEROL SULFATE 2.5; .5 MG/3ML; MG/3ML
3 SOLUTION RESPIRATORY (INHALATION) EVERY 4 HOURS PRN
Status: DISCONTINUED | OUTPATIENT
Start: 2019-01-28 | End: 2019-01-29

## 2019-01-28 RX ORDER — TRAZODONE HYDROCHLORIDE 50 MG/1
50 TABLET ORAL NIGHTLY PRN
Status: DISCONTINUED | OUTPATIENT
Start: 2019-01-28 | End: 2019-01-29

## 2019-01-28 RX ORDER — HEPARIN SODIUM AND DEXTROSE 10000; 5 [USP'U]/100ML; G/100ML
INJECTION INTRAVENOUS CONTINUOUS
Status: DISCONTINUED | OUTPATIENT
Start: 2019-01-28 | End: 2019-01-29

## 2019-01-28 RX ORDER — HEPARIN SODIUM AND DEXTROSE 10000; 5 [USP'U]/100ML; G/100ML
1000 INJECTION INTRAVENOUS ONCE
Status: COMPLETED | OUTPATIENT
Start: 2019-01-28 | End: 2019-01-28

## 2019-01-28 RX ORDER — IPRATROPIUM BROMIDE AND ALBUTEROL SULFATE 2.5; .5 MG/3ML; MG/3ML
3 SOLUTION RESPIRATORY (INHALATION)
Status: DISCONTINUED | OUTPATIENT
Start: 2019-01-29 | End: 2019-01-29

## 2019-01-28 RX ORDER — ALPRAZOLAM 1 MG/1
1 TABLET ORAL 2 TIMES DAILY PRN
Status: DISCONTINUED | OUTPATIENT
Start: 2019-01-28 | End: 2019-01-29

## 2019-01-28 RX ORDER — HEPARIN SODIUM 5000 [USP'U]/ML
5000 INJECTION INTRAVENOUS; SUBCUTANEOUS ONCE
Status: COMPLETED | OUTPATIENT
Start: 2019-01-28 | End: 2019-01-28

## 2019-01-28 NOTE — CM/SW NOTE
Order received to check cost of NOAC, patient has [facility], will need script sent to patient's pharmacy and to call CM if a prior Phil Felix is required. Will give savings card when medication known. Reviewed with primary rn.      Brandon Garcia RN, Case Manage

## 2019-01-28 NOTE — PROGRESS NOTES
Received patient on cardizem gtt afib rates in the 100-120's. Patient a/o.vss. Titrate as ordered gtt at 10mg/hr. No questions or concerns at this time.

## 2019-01-28 NOTE — PROGRESS NOTES
[facility] Hospitalist Progress Note                                                                   [address]  3/7/1950    SUBJECTIVE: Pt tearful and states all her friends are dead.  Earnstine Beverage Levothyroxine Sodium  137 mcg Oral Before breakfast   • Metoprolol Succinate ER  25 mg Oral Daily Beta Blocker   • Pantoprazole Sodium  40 mg Oral QAM AC   • simvastatin  10 mg Oral Nightly   • Umeclidinium Bromide  1 puff Inhalation Daily   • ipratropium- lasix resumed     **recent mechanical fall on ice  -CT Brain ordered- hit head  -R knee XR with OA, no fx     **chronic diastolic HF- no acute issues. Cont lasix     **DMII- no acute issues. Hold any orals.  Iss, accuchecks     **HL, hypothyroidism s/p thyr

## 2019-01-28 NOTE — PLAN OF CARE
CARDIOVASCULAR - ADULT    • Maintains optimal cardiac output and hemodynamic stability Progressing    • Absence of cardiac arrhythmias or at baseline Progressing        Patient/Family Goals    • Patient/Family Long Term Goal Progressing        RESPIRATORY

## 2019-01-28 NOTE — CM/SW NOTE
Order received that patient has questions re: obs status and insurance. Spoke with patient, informed she is inpatient as of 1/21, IM given. Informed that she should refer to her insurance company for specific out of pockets costs.     /case man

## 2019-01-28 NOTE — ED PROVIDER NOTES
Patient Seen in: [facility] 2ne-a    History   Patient presents with:  Chest Pain Angina (cardiovascular)  Dyspnea CAROLA SOB (respiratory)    Stated Complaint: chest pain    HPI    80-year-old female here for difficulty breathing for the last several findings: benign; by Dr Fernando Graham in [address] [facility] N/A 2016    Performed by Joyce Nguyễn MD at [facility] ENDOSCOPY   •      • CAUDAL EPIDURAL STEROID INJECTION N/A 2014    Performed by Brennan Chandler MD at [address] [place] [address] ROBOT-ASSISTED LAPAROSCOPIC VENTRAL HERNIA REPAIR N/A 5/14/2018    Performed by Mary Lizarraga MD at [facility] MAIN OR       Family history reviewed and is not pertinent to presenting problem.     Social History    Tobacco Use      Smoking status: Former Smoker pain.      ED Course     Labs Reviewed   COMP METABOLIC PANEL (14) - Abnormal; Notable for the following components:       Result Value    CO2 33.0 (*)     Creatinine 1.10 (*)     GFR, Non- 52 (*)     Total Protein 8.5 (*)     Globulin  5.0 POCT GLUCOSE - Abnormal; Notable for the following components:    POC Glucose 182 (*)     All other components within normal limits   POCT GLUCOSE - Abnormal; Notable for the following components:    POC Glucose 231 (*)     All other components within no Abnormality         Status                     ---------                               -----------         ------                     CBC W/ DIFFERENTIAL[[id number]]          Abnormal            Final result                 Please view results for these melinda [id number]  [phone]    Schedule an appointment as soon as possible for a visit      Marialuisa Chirinos MD  [address] [facility]  [phone]    In 2 weeks          Medications Prescribed:  Current Discharge Medication List

## 2019-01-28 NOTE — PROGRESS NOTES
[address] Patient Status:  Inpatient    3/7/1950 MRN [id number]   [facility] 2NE-A Attending Hazel Lung, 1604 [facility] Day # 1 PCP Jeanie Teran MD     [address] [facility] cancelled yesterday due to A. Fib with RVR Daily  •  DULoxetine HCl (CYMBALTA) DR particles cap 90 mg, 90 mg, Oral, Daily  •  Fluticasone Furoate-Vilanterol (BREO ELLIPTA) 200-25 MCG/INH inhaler 1 puff, 1 puff, Inhalation, Daily  •  Fluticasone Propionate (FLONASE) 50 MCG/ACT nasal spray 2 spray, 2 Results   Component Value Date    INR 1.03 06/27/2017    INR 0.98 08/08/2016    INR 1.0 06/05/2013          Imaging: I have independently visualized all relevant chest imaging in PACS. I agree with the radiology interpretation except where noted.        AS

## 2019-01-28 NOTE — CONSULTS
Johan 2 Cardiology  Report of Consultation    [facility] Patient Status:  Inpatient    3/7/1950 MRN [id number]   [facility] 2NE-A Attending Chandni Gupta, 160[address] [facility] Day # 1 PCP Timothy Brewer MD     Reason for [facility] • Thyroid cancer ([facility] [address]     localized, s/p resection   • Visual impairment     GLASSES       Social History:   Smoking:  None  Alcohol:  None    Family History:   No family history of premature arthrosclerotic heart disease     Medications:   Scheduled: Disp:  Rfl: 0   Tiotropium Bromide Monohydrate (SPIRIVA HANDIHALER) 18 MCG Inhalation Cap Inhale 1 capsule (18 mcg total) into the lungs daily.  Disp: 90 capsule Rfl: 3   Albuterol Sulfate  (90 Base) MCG/ACT Inhalation Aero Soln Inhale 1 puff into th review of systems is otherwise negative or unremarkable. Physical Exam:    01/28/19  1211   BP: 105/73   Pulse: 92   Resp: 18   Temp: 98.3 °F (36.8 °C)     Wt Readings from Last 3 Encounters:  01/28/19 : 261 lb 9.6 oz (118.7 kg)  11/12/18 : 262 lb (118. 24.2*   MCHC  31.2  29.4*   RDW  17.2*  17.0*   NEPRELIM  6.99*  10.01*   WBC  10.8  11.6   PLT  322.0  331.0       No results for input(s): PTP, INR in the last 168 hours.     Recent Labs   Lab  01/24/19   1844   TROP  <0.046       Diagnostics:   Tele:  A

## 2019-01-29 VITALS
HEART RATE: 98 BPM | WEIGHT: 261.69 LBS | BODY MASS INDEX: 48.16 KG/M2 | TEMPERATURE: 99 F | RESPIRATION RATE: 18 BRPM | SYSTOLIC BLOOD PRESSURE: 134 MMHG | HEIGHT: 62 IN | OXYGEN SATURATION: 93 % | DIASTOLIC BLOOD PRESSURE: 76 MMHG

## 2019-01-29 LAB
APTT PPP: 55.8 SECONDS (ref 26.1–34.6)
GLUCOSE BLD-MCNC: 113 MG/DL (ref 65–99)
GLUCOSE BLD-MCNC: 174 MG/DL (ref 65–99)
GLUCOSE BLD-MCNC: 261 MG/DL (ref 65–99)
INR BLD: 0.95 (ref 0.9–1.1)
PLATELET # BLD AUTO: 343 10(3)UL (ref 150–450)
PSA SERPL DL<=0.01 NG/ML-MCNC: 13.1 SECONDS (ref 12.4–14.7)

## 2019-01-29 PROCEDURE — 90471 IMMUNIZATION ADMIN: CPT

## 2019-01-29 PROCEDURE — 94640 AIRWAY INHALATION TREATMENT: CPT

## 2019-01-29 PROCEDURE — 85610 PROTHROMBIN TIME: CPT | Performed by: INTERNAL MEDICINE

## 2019-01-29 PROCEDURE — 36430 TRANSFUSION BLD/BLD COMPNT: CPT

## 2019-01-29 PROCEDURE — 85730 THROMBOPLASTIN TIME PARTIAL: CPT | Performed by: INTERNAL MEDICINE

## 2019-01-29 PROCEDURE — 85049 AUTOMATED PLATELET COUNT: CPT | Performed by: INTERNAL MEDICINE

## 2019-01-29 PROCEDURE — 82962 GLUCOSE BLOOD TEST: CPT

## 2019-01-29 RX ORDER — DILTIAZEM HYDROCHLORIDE 300 MG/1
300 CAPSULE, COATED, EXTENDED RELEASE ORAL DAILY
Status: DISCONTINUED | OUTPATIENT
Start: 2019-01-30 | End: 2019-01-29

## 2019-01-29 RX ORDER — TRAZODONE HYDROCHLORIDE 50 MG/1
50 TABLET ORAL NIGHTLY PRN
Qty: 7 TABLET | Refills: 0 | Status: SHIPPED | OUTPATIENT
Start: 2019-01-29 | End: 2019-08-01

## 2019-01-29 RX ORDER — DILTIAZEM HYDROCHLORIDE 300 MG/1
300 CAPSULE, COATED, EXTENDED RELEASE ORAL DAILY
Qty: 30 CAPSULE | Refills: 3 | Status: ON HOLD | OUTPATIENT
Start: 2019-01-30 | End: 2019-06-11

## 2019-01-29 NOTE — PROGRESS NOTES
[address] Patient Status:  Inpatient    3/7/1950 MRN [id number]   [facility] 2NE-A Attending Nancy Olvera, 160[facility] Day # 2 PCP Milad Borja MD     SUBJECTIVE: Pt complains of thick mucus.   Denies significant S Oral, Daily  •  Fluticasone Furoate-Vilanterol (BREO ELLIPTA) 200-25 MCG/INH inhaler 1 puff, 1 puff, Inhalation, Daily  •  Fluticasone Propionate (FLONASE) 50 MCG/ACT nasal spray 2 spray, 2 spray, Nasal, Daily  •  furosemide (LASIX) tab 60 mg, 60 mg, Oral, MCHC 31.4 01/28/2019    RDW 17.8 01/28/2019    .0 01/29/2019     Lab Results   Component Value Date     01/28/2019    K 4.4 01/28/2019    CL 97 01/28/2019    CO2 33.0 01/28/2019    BUN 30 01/28/2019    CREATSERUM 1.19 01/28/2019

## 2019-01-29 NOTE — PROGRESS NOTES
[facility] Cardiology  Progress Note    Ky Sour Patient Status:  Inpatient    3/7/1950 MRN [id number]   [facility] 2NE-A Attending Radha Yo, [address] [facility] [address] Day # 2 PCP Dustin Garcia MD     Subjective:   No chest pain.   B hours) at 1/29/2019 1730  Last data filed at 1/29/2019 1330  Gross per 24 hour   Intake 1060 ml   Output 750 ml   Net 310 ml       Wt Readings from Last 3 Encounters:  01/29/19 : 261 lb 11 oz (118.7 kg)  11/12/18 : 262 lb (118.8 kg)  11/09/18 : 257 lb (116 --   10.3   ALB  3.5   --    --    --    NA  140  138   --   135*   K  4.5   --   4.9  4.4   CL  101  101   --   97*   CO2  33.0*  32.0   --   33.0*   ALKPHO  133   --    --    --    AST  40   --    --    --    ALT  26   --    --    --    BILT  0.5   -- ASHLEY  6. Abn CT lungs  7. NL LV function  8. Elevated PAP   -Most likely reflective of obesity, ASHLEY, COPD  9. Obesity           Plan:   1. D/C IV Heparin  2.  Eliquis  3. Increase Cardizem to 300mg Po qAM  4. Optimize COPD  5. Lasix  6.   F/U in 2 we

## 2019-01-29 NOTE — CM/SW NOTE
Script for Eliquis sent to pharmacy, no prior auth required. 30 day free trial card given to patient.     General Petersen RN,   Phone [phone]  Pager [phone]

## 2019-01-30 NOTE — PROGRESS NOTES
NURSING DISCHARGE NOTE    Discharged Home via Wheelchair. Accompanied by Support staff  Belongings Taken by patient/family. Pt PIV removed with catheter intact and tele discontinued. Pt received discharge instructions and follow up information.  Med

## 2019-02-03 NOTE — DISCHARGE SUMMARY
General Medicine Discharge Summary     Patient ID:  Porter Madrigal  76year old  3/7/1950    Admit date: 1/24/2019    Discharge date and time: 1/29/2019  6:41 PM     Attending Physician: Rachel Loza unremarkable     **Afib with RVR  -per chart review has had episode in the past  -tsh checked - cont current synthroid dose and f/u with pcp/endo as she hasnt seen in a while  -Echo done - see report: normal ef, elevated PA pressure  -Cards consulted  -IV MG Oral Tab  4 pills daily x 3 days, then 3 pills daily x 3 days, then 2 pills daily x 3 days, then 1 pill daily x 3 days, then end., Normal, Disp-30 tablet, R-0      CONTINUE these medications which have NOT CHANGED    furosemide 40 MG Oral Tab  Take 1.5 R-3    Fluticasone Propionate 50 MCG/ACT Nasal Suspension  2 sprays by Nasal route daily. , Script not printed, Disp-1 Bottle, R-3    Cholecalciferol (VITAMIN D) 2000 UNITS Oral Cap  Take 10,000 Units by mouth daily.   , Historical      STOP taking these med

## 2019-02-05 PROBLEM — R91.1 LUNG NODULE: Status: ACTIVE | Noted: 2019-02-05

## 2019-02-05 PROBLEM — N18.30 HYPERTENSIVE HEART AND KIDNEY DISEASE WITH CHRONIC DIASTOLIC CONGESTIVE HEART FAILURE AND STAGE 3 CHRONIC KIDNEY DISEASE (HCC): Status: ACTIVE | Noted: 2019-02-05

## 2019-02-05 PROBLEM — I50.32 HYPERTENSIVE HEART AND KIDNEY DISEASE WITH CHRONIC DIASTOLIC CONGESTIVE HEART FAILURE AND STAGE 3 CHRONIC KIDNEY DISEASE (HCC): Status: ACTIVE | Noted: 2019-02-05

## 2019-02-05 PROBLEM — J44.1 COPD WITH EXACERBATION (HCC): Status: ACTIVE | Noted: 2019-02-05

## 2019-02-05 PROBLEM — I13.0 HYPERTENSIVE HEART AND KIDNEY DISEASE WITH CHRONIC DIASTOLIC CONGESTIVE HEART FAILURE AND STAGE 3 CHRONIC KIDNEY DISEASE (HCC): Status: ACTIVE | Noted: 2019-02-05

## 2019-02-05 PROBLEM — I70.0 AORTIC ATHEROSCLEROSIS (HCC): Status: ACTIVE | Noted: 2019-02-05

## 2019-02-18 PROBLEM — E11.22 TYPE 2 DIABETES MELLITUS WITH STAGE 3 CHRONIC KIDNEY DISEASE, WITHOUT LONG-TERM CURRENT USE OF INSULIN (HCC): Status: ACTIVE | Noted: 2019-02-18

## 2019-02-18 PROBLEM — N18.30 TYPE 2 DIABETES MELLITUS WITH STAGE 3 CHRONIC KIDNEY DISEASE, WITHOUT LONG-TERM CURRENT USE OF INSULIN (HCC): Status: ACTIVE | Noted: 2019-02-18

## 2019-02-18 PROBLEM — I48.0 PAF (PAROXYSMAL ATRIAL FIBRILLATION) (HCC): Status: ACTIVE | Noted: 2019-02-18

## 2019-06-05 ENCOUNTER — APPOINTMENT (OUTPATIENT)
Dept: GENERAL RADIOLOGY | Facility: HOSPITAL | Age: 69
DRG: 193 | End: 2019-06-05
Attending: EMERGENCY MEDICINE
Payer: MEDICARE

## 2019-06-05 ENCOUNTER — HOSPITAL ENCOUNTER (INPATIENT)
Facility: HOSPITAL | Age: 69
LOS: 6 days | Discharge: HOME | DRG: 193 | End: 2019-06-11
Attending: EMERGENCY MEDICINE | Admitting: INTERNAL MEDICINE
Payer: MEDICARE

## 2019-06-05 DIAGNOSIS — J18.9 COMMUNITY ACQUIRED PNEUMONIA OF LEFT LOWER LOBE OF LUNG: Primary | ICD-10-CM

## 2019-06-05 DIAGNOSIS — J44.1 COPD EXACERBATION (HCC): ICD-10-CM

## 2019-06-05 DIAGNOSIS — E87.1 HYPONATREMIA: ICD-10-CM

## 2019-06-05 LAB
ALBUMIN SERPL-MCNC: 3.7 G/DL (ref 3.4–5)
ALBUMIN/GLOB SERPL: 0.8 {RATIO} (ref 1–2)
ALP LIVER SERPL-CCNC: 126 U/L (ref 55–142)
ALT SERPL-CCNC: 18 U/L (ref 13–56)
ANION GAP SERPL CALC-SCNC: 4 MMOL/L (ref 0–18)
AST SERPL-CCNC: 19 U/L (ref 15–37)
ATRIAL RATE: 54 BPM
BASOPHILS # BLD AUTO: 0.07 X10(3) UL (ref 0–0.2)
BASOPHILS NFR BLD AUTO: 0.7 %
BILIRUB SERPL-MCNC: 0.4 MG/DL (ref 0.1–2)
BILIRUB UR QL STRIP.AUTO: NEGATIVE
BUN BLD-MCNC: 12 MG/DL (ref 7–18)
BUN/CREAT SERPL: 15.2 (ref 10–20)
CALCIUM BLD-MCNC: 10.3 MG/DL (ref 8.5–10.1)
CHLORIDE SERPL-SCNC: 103 MMOL/L (ref 98–112)
CLARITY UR REFRACT.AUTO: CLEAR
CO2 SERPL-SCNC: 28 MMOL/L (ref 21–32)
COLOR UR AUTO: YELLOW
CREAT BLD-MCNC: 0.79 MG/DL (ref 0.55–1.02)
DEPRECATED RDW RBC AUTO: 47.3 FL (ref 35.1–46.3)
EOSINOPHIL # BLD AUTO: 0.14 X10(3) UL (ref 0–0.7)
EOSINOPHIL NFR BLD AUTO: 1.4 %
ERYTHROCYTE [DISTWIDTH] IN BLOOD BY AUTOMATED COUNT: 16.6 % (ref 11–15)
GLOBULIN PLAS-MCNC: 4.5 G/DL (ref 2.8–4.4)
GLUCOSE BLD-MCNC: 117 MG/DL (ref 70–99)
GLUCOSE BLD-MCNC: 264 MG/DL (ref 70–99)
GLUCOSE BLD-MCNC: 299 MG/DL (ref 70–99)
GLUCOSE UR STRIP.AUTO-MCNC: NEGATIVE MG/DL
HCT VFR BLD AUTO: 42.6 % (ref 35–48)
HGB BLD-MCNC: 13.1 G/DL (ref 12–16)
IMM GRANULOCYTES # BLD AUTO: 0.09 X10(3) UL (ref 0–1)
IMM GRANULOCYTES NFR BLD: 0.9 %
KETONES UR STRIP.AUTO-MCNC: NEGATIVE MG/DL
LEUKOCYTE ESTERASE UR QL STRIP.AUTO: NEGATIVE
LYMPHOCYTES # BLD AUTO: 1.57 X10(3) UL (ref 1–4)
LYMPHOCYTES NFR BLD AUTO: 16.1 %
M PROTEIN MFR SERPL ELPH: 8.2 G/DL (ref 6.4–8.2)
MCH RBC QN AUTO: 24.5 PG (ref 26–34)
MCHC RBC AUTO-ENTMCNC: 30.8 G/DL (ref 31–37)
MCV RBC AUTO: 79.6 FL (ref 80–100)
MONOCYTES # BLD AUTO: 1.28 X10(3) UL (ref 0.1–1)
MONOCYTES NFR BLD AUTO: 13.1 %
NEUTROPHILS # BLD AUTO: 6.62 X10 (3) UL (ref 1.5–7.7)
NEUTROPHILS # BLD AUTO: 6.62 X10(3) UL (ref 1.5–7.7)
NEUTROPHILS NFR BLD AUTO: 67.8 %
NITRITE UR QL STRIP.AUTO: NEGATIVE
NT-PROBNP SERPL-MCNC: 332 PG/ML (ref ?–125)
OSMOLALITY SERPL CALC.SUM OF ELEC: 281 MOSM/KG (ref 275–295)
P AXIS: 68 DEGREES
P-R INTERVAL: 150 MS
PH UR STRIP.AUTO: 5 [PH] (ref 4.5–8)
PLATELET # BLD AUTO: 323 10(3)UL (ref 150–450)
POTASSIUM SERPL-SCNC: 4.4 MMOL/L (ref 3.5–5.1)
PROCALCITONIN SERPL-MCNC: 0.04 NG/ML
PROT UR STRIP.AUTO-MCNC: NEGATIVE MG/DL
Q-T INTERVAL: 478 MS
QRS DURATION: 132 MS
QTC CALCULATION (BEZET): 453 MS
R AXIS: -25 DEGREES
RBC # BLD AUTO: 5.35 X10(6)UL (ref 3.8–5.3)
RBC UR QL AUTO: NEGATIVE
SODIUM SERPL-SCNC: 135 MMOL/L (ref 136–145)
SP GR UR STRIP.AUTO: 1.01 (ref 1–1.03)
T AXIS: 15 DEGREES
TROPONIN I SERPL-MCNC: <0.045 NG/ML (ref ?–0.04)
UROBILINOGEN UR STRIP.AUTO-MCNC: <2 MG/DL
VENTRICULAR RATE: 54 BPM
WBC # BLD AUTO: 9.8 X10(3) UL (ref 4–11)

## 2019-06-05 PROCEDURE — 96375 TX/PRO/DX INJ NEW DRUG ADDON: CPT

## 2019-06-05 PROCEDURE — 99285 EMERGENCY DEPT VISIT HI MDM: CPT

## 2019-06-05 PROCEDURE — 94660 CPAP INITIATION&MGMT: CPT

## 2019-06-05 PROCEDURE — 84145 PROCALCITONIN (PCT): CPT | Performed by: INTERNAL MEDICINE

## 2019-06-05 PROCEDURE — 80053 COMPREHEN METABOLIC PANEL: CPT | Performed by: EMERGENCY MEDICINE

## 2019-06-05 PROCEDURE — 94640 AIRWAY INHALATION TREATMENT: CPT

## 2019-06-05 PROCEDURE — 93010 ELECTROCARDIOGRAM REPORT: CPT

## 2019-06-05 PROCEDURE — 84484 ASSAY OF TROPONIN QUANT: CPT | Performed by: EMERGENCY MEDICINE

## 2019-06-05 PROCEDURE — 87449 NOS EACH ORGANISM AG IA: CPT | Performed by: INTERNAL MEDICINE

## 2019-06-05 PROCEDURE — 5A09357 ASSISTANCE WITH RESPIRATORY VENTILATION, LESS THAN 24 CONSECUTIVE HOURS, CONTINUOUS POSITIVE AIRWAY PRESSURE: ICD-10-PCS | Performed by: INTERNAL MEDICINE

## 2019-06-05 PROCEDURE — 85025 COMPLETE CBC W/AUTO DIFF WBC: CPT | Performed by: EMERGENCY MEDICINE

## 2019-06-05 PROCEDURE — 81003 URINALYSIS AUTO W/O SCOPE: CPT | Performed by: EMERGENCY MEDICINE

## 2019-06-05 PROCEDURE — 71045 X-RAY EXAM CHEST 1 VIEW: CPT | Performed by: EMERGENCY MEDICINE

## 2019-06-05 PROCEDURE — 93005 ELECTROCARDIOGRAM TRACING: CPT

## 2019-06-05 PROCEDURE — 36415 COLL VENOUS BLD VENIPUNCTURE: CPT

## 2019-06-05 PROCEDURE — 96365 THER/PROPH/DIAG IV INF INIT: CPT

## 2019-06-05 PROCEDURE — 82962 GLUCOSE BLOOD TEST: CPT

## 2019-06-05 PROCEDURE — 87040 BLOOD CULTURE FOR BACTERIA: CPT | Performed by: EMERGENCY MEDICINE

## 2019-06-05 PROCEDURE — 83880 ASSAY OF NATRIURETIC PEPTIDE: CPT | Performed by: EMERGENCY MEDICINE

## 2019-06-05 RX ORDER — PANTOPRAZOLE SODIUM 20 MG/1
20 TABLET, DELAYED RELEASE ORAL
Status: DISCONTINUED | OUTPATIENT
Start: 2019-06-05 | End: 2019-06-11

## 2019-06-05 RX ORDER — METOPROLOL SUCCINATE 25 MG/1
25 TABLET, EXTENDED RELEASE ORAL
Status: DISCONTINUED | OUTPATIENT
Start: 2019-06-06 | End: 2019-06-11

## 2019-06-05 RX ORDER — ROSUVASTATIN CALCIUM 40 MG/1
40 TABLET, COATED ORAL NIGHTLY
Status: DISCONTINUED | OUTPATIENT
Start: 2019-06-05 | End: 2019-06-11

## 2019-06-05 RX ORDER — ACETAMINOPHEN 325 MG/1
650 TABLET ORAL EVERY 6 HOURS PRN
Status: DISCONTINUED | OUTPATIENT
Start: 2019-06-05 | End: 2019-06-11

## 2019-06-05 RX ORDER — ENOXAPARIN SODIUM 100 MG/ML
40 INJECTION SUBCUTANEOUS DAILY
Status: CANCELLED | OUTPATIENT
Start: 2019-06-05

## 2019-06-05 RX ORDER — DIAZEPAM 10 MG/1
10 TABLET ORAL NIGHTLY PRN
Status: DISCONTINUED | OUTPATIENT
Start: 2019-06-05 | End: 2019-06-11

## 2019-06-05 RX ORDER — METHYLPREDNISOLONE SODIUM SUCCINATE 125 MG/2ML
60 INJECTION, POWDER, LYOPHILIZED, FOR SOLUTION INTRAMUSCULAR; INTRAVENOUS EVERY 8 HOURS
Status: DISCONTINUED | OUTPATIENT
Start: 2019-06-05 | End: 2019-06-06

## 2019-06-05 RX ORDER — METHYLPREDNISOLONE SODIUM SUCCINATE 125 MG/2ML
125 INJECTION, POWDER, LYOPHILIZED, FOR SOLUTION INTRAMUSCULAR; INTRAVENOUS ONCE
Status: COMPLETED | OUTPATIENT
Start: 2019-06-05 | End: 2019-06-05

## 2019-06-05 RX ORDER — ASPIRIN 81 MG/1
81 TABLET ORAL DAILY
Status: DISCONTINUED | OUTPATIENT
Start: 2019-06-06 | End: 2019-06-11

## 2019-06-05 RX ORDER — DEXTROSE MONOHYDRATE 25 G/50ML
50 INJECTION, SOLUTION INTRAVENOUS
Status: DISCONTINUED | OUTPATIENT
Start: 2019-06-05 | End: 2019-06-11

## 2019-06-05 RX ORDER — IPRATROPIUM BROMIDE AND ALBUTEROL SULFATE 2.5; .5 MG/3ML; MG/3ML
3 SOLUTION RESPIRATORY (INHALATION) ONCE
Status: COMPLETED | OUTPATIENT
Start: 2019-06-05 | End: 2019-06-05

## 2019-06-05 RX ORDER — IPRATROPIUM BROMIDE AND ALBUTEROL SULFATE 2.5; .5 MG/3ML; MG/3ML
3 SOLUTION RESPIRATORY (INHALATION) EVERY 6 HOURS
Status: DISCONTINUED | OUTPATIENT
Start: 2019-06-05 | End: 2019-06-10

## 2019-06-05 RX ORDER — GABAPENTIN 100 MG/1
100 CAPSULE ORAL DAILY
Status: DISCONTINUED | OUTPATIENT
Start: 2019-06-06 | End: 2019-06-11

## 2019-06-05 RX ORDER — SODIUM CHLORIDE 9 MG/ML
INJECTION, SOLUTION INTRAVENOUS CONTINUOUS
Status: CANCELLED | OUTPATIENT
Start: 2019-06-05

## 2019-06-05 RX ORDER — DILTIAZEM HYDROCHLORIDE 300 MG/1
300 CAPSULE, COATED, EXTENDED RELEASE ORAL DAILY
Status: DISCONTINUED | OUTPATIENT
Start: 2019-06-06 | End: 2019-06-11

## 2019-06-05 RX ORDER — FLUTICASONE PROPIONATE 50 MCG
2 SPRAY, SUSPENSION (ML) NASAL DAILY
Status: DISCONTINUED | OUTPATIENT
Start: 2019-06-06 | End: 2019-06-11

## 2019-06-05 RX ORDER — TRAZODONE HYDROCHLORIDE 50 MG/1
50 TABLET ORAL NIGHTLY PRN
Status: DISCONTINUED | OUTPATIENT
Start: 2019-06-05 | End: 2019-06-11

## 2019-06-05 NOTE — ED PROVIDER NOTES
Patient Seen in: [facility] Emergency Department    History   Patient presents with:  Dyspnea SHANNON SOB (respiratory)    Stated Complaint: shannon    HPI    63-year-old female presents to the emergency department after seeing her pulmonologist today.   Adina Clark SURGERY   • Reflux    • Sleep apnea     CPAP   • Status post thyroidectomy    • Thyroid ca [facility]) 6/27/2013   • Thyroid cancer ([facility] [address]     localized, s/p resection   • Visual impairment     GLASSES       Past Surgical History:   Procedure Laterality Date   • Karen Chu MD at [address] [facility] 5-6-7   • THYROID LOBECTOMY,UNILAT  abt 1982    left hemithyroidectomy; in [address]  Right 6/11/2013    Performed by Rogelio Pedersen MD at [address] [facility] stridor. She is in respiratory distress. She has wheezes. Abdominal: Soft. Bowel sounds are normal. There is no tenderness. There is no rebound. Musculoskeletal: Normal range of motion. She exhibits no edema or tenderness.    Lymphadenopathy:     She ha CULTURE   BLOOD CULTURE     EKG    Rate, intervals and axes as noted on EKG Report.   Rate: 54  Rhythm: Sinus Rhythm  Reading: Right bundle branch block no acute ST segment changes              Xr Chest Ap Portable  (cpt=71045)    Result Date: 6/5/2019  PRO 6/5/2019  3:04 PM                 Disposition and Plan     Clinical Impression:  Community acquired pneumonia of left lower lobe of lung ([facility] [address]  (primary encounter diagnosis)  COPD exacerbation ([facility] [address]  Hyponatremia    Disposition:  Admit  6/5/2019  3:04 pm

## 2019-06-05 NOTE — PLAN OF CARE
NURSING ADMISSION NOTE      Patient admitted via cart from ER  Oriented to room. Safety precautions initiated. Bed in low position. Call light in reach. Updated history and pta meds.  Gave report to Willow

## 2019-06-05 NOTE — ED NOTES
Transport here to take patient to room. Patient stable following neb treatment. Placed back on 2L nc O2. No acute distress observed. Remains updated with plan of care. Belongings placed in bag and accompany her during transport.

## 2019-06-05 NOTE — ED INITIAL ASSESSMENT (HPI)
Pt sent to ED by her doctor for wheezing. Pt hasn't felt well for > 2 weeks. Pt c/o cough. Pt does have COPD.

## 2019-06-05 NOTE — CONSULTS
Pulmonary H&P/Consult       NAME: Violet Closs - ROOM: C5/C5 - MRN: [id number] - Age: 71year old - :  3/7/1950    Date of Admission: 2019  1:07 PM  Admission Diagnosis: No admission diagnoses are documented for this encounter.   Reason for consult: localized, s/p resection   • Visual impairment     GLASSES      Past Surgical History:   Procedure Laterality Date   • APPENDECTOMY     • BENIGN BIOPSY LEFT  ?   • BIOPSY OF BREAST, INCISIONAL      multiple, all benign   • BIOPSY OF BREAST, INCISIONAL  7/9 hemithyroidectomy; in [place]   • THYROIDECTOMY Right 6/11/2013    Performed by Jose Taylor MD at [facility] MAIN OR   • TONSILLECTOMY     • UPPER GI ENDOSCOPY,BIOPSY  8/11/2009    findings: hiatal hernia, mild gastritis; bx results unknown; by Dr Pop Ackerman Attends [employer] service: Not on file        Active member of club or organization: Not on file        Attends meetings of clubs or organizations: Not on file        Relationship status: Not on file      Intimate partner violence:        Fear of current o 3 Encounters:  06/05/19 : 248 lb (112.5 kg)  06/05/19 : 248 lb (112.5 kg)  05/17/19 : 249 lb (112.9 kg)        Intake/Output Summary (Last 24 hours) at 6/5/2019 1551  Last data filed at 6/5/2019 1502  Gross per 24 hour   Intake 100 ml   Output —   Net 100 1.2 Final     Comment:     Reference interval is for non-anticoagulated patients.   Suggested INR therapeutic range for Vitamin K  antagonist therapy:     Standard Dose (moderate intensity                    therapeutic range):       2.0 - 3.0     Higher in

## 2019-06-05 NOTE — ED NOTES
Patient remains updated with results and plan of care. Reports not feeling much different after neb treatment. Continues to have expiratory wheezing. Provided with blanket and something to eat/drink. Remains on 2L nc O2 at this time.

## 2019-06-05 NOTE — ED NOTES
Report called to Luz Bales RN at this time for room 3621. Respiratory called to administer additional nebulizer treatment. Patient remains updated with results and plan of care. Remains on 2L nc O2. Eating sandwich and drinking juice.  Will arrange transport o

## 2019-06-05 NOTE — ED NOTES
Pt awake and alert, skin w/d,resps reg/slightly labored. Dr. Earline Elias at bedside speaking with pt.

## 2019-06-05 NOTE — H&P
[facility] Hospitalist H&P       CC: Patient presents with:  Dyspnea CAROLA SOB (respiratory)       PCP: Ralph Dey MD    History of Present Illness:  66y/o F with hx COPD, sarcoidosis, ASHLEY, HTN, HLD, hypothyroidism who was sent to the ED from pulm clinic for S Surgical History:   Procedure Laterality Date   • APPENDECTOMY     • BENIGN BIOPSY LEFT  ?   • BIOPSY OF BREAST, INCISIONAL      multiple, all benign   • BIOPSY OF BREAST, INCISIONAL  7/9/2007    findings: benign; by Dr Hannah Bal in [place] [address]. Performed by Harriet Humphreys MD at [facility] MAIN OR   • TONSILLECTOMY     • UPPER GI ENDOSCOPY,BIOPSY  8/11/2009    findings: hiatal hernia, mild gastritis; bx results unknown; by Dr Rodrigue Pina   • Giovanny Mario N/A 5/14/201 (112 kg)  06/05/19 : 248 lb (112.5 kg)  05/17/19 : 249 lb (112.9 kg)  05/15/19 : 250 lb (113.4 kg)  02/21/19 : 256 lb (116.1 kg)  02/19/19 : 254 lb (115.2 kg)    Gen: alert, oriented, NAD  Pulm: diminished throughout with diffuse exp wheezing and poor air lobe on image 136 is unchanged. There is more confluent opacification with associated bronchiectasis in the left lower lobe than on the previous study. There is biapical pleural parenchymal scarring as well.  Pleura:  Normal. No pleural effusion or thickeni There are several peripheral nodular opacities in the lateral and inferior aspect of the right lower lobe which are new since 11/15/2017. The largest lesion measures up to 12 mm.  The findings may be infectious or inflammatory, however, correlation with a d obstruction or wall thickening. There is no pneumoperitoneum or significant free fluid. URINARY BLADDER: No bladder wall thickening. No perivesical fat stranding. No evidence of bladder calculi. PELVIC LYMPH NODES: No pelvic/inguinal lymphadenopathy.  PELVI left lung base with partial silhouette of the left diaphragm contour. No new pleural effusion. Small scattered nodular opacities are similar to the prior. The largest 1 is in the right upper lung zone measuring approximately 1.5 cm.   Heart and pulmonary

## 2019-06-06 LAB
ANION GAP SERPL CALC-SCNC: 4 MMOL/L (ref 0–18)
BUN BLD-MCNC: 18 MG/DL (ref 7–18)
BUN/CREAT SERPL: 23.1 (ref 10–20)
CALCIUM BLD-MCNC: 10.2 MG/DL (ref 8.5–10.1)
CHLORIDE SERPL-SCNC: 103 MMOL/L (ref 98–112)
CO2 SERPL-SCNC: 29 MMOL/L (ref 21–32)
CREAT BLD-MCNC: 0.78 MG/DL (ref 0.55–1.02)
DEPRECATED RDW RBC AUTO: 46.6 FL (ref 35.1–46.3)
ERYTHROCYTE [DISTWIDTH] IN BLOOD BY AUTOMATED COUNT: 16.3 % (ref 11–15)
GLUCOSE BLD-MCNC: 211 MG/DL (ref 70–99)
GLUCOSE BLD-MCNC: 220 MG/DL (ref 70–99)
GLUCOSE BLD-MCNC: 224 MG/DL (ref 70–99)
GLUCOSE BLD-MCNC: 238 MG/DL (ref 70–99)
GLUCOSE BLD-MCNC: 287 MG/DL (ref 70–99)
HCT VFR BLD AUTO: 39.7 % (ref 35–48)
HGB BLD-MCNC: 12.1 G/DL (ref 12–16)
L PNEUMO AG UR QL: NEGATIVE
MCH RBC QN AUTO: 24.1 PG (ref 26–34)
MCHC RBC AUTO-ENTMCNC: 30.5 G/DL (ref 31–37)
MCV RBC AUTO: 79.1 FL (ref 80–100)
OSMOLALITY SERPL CALC.SUM OF ELEC: 291 MOSM/KG (ref 275–295)
PLATELET # BLD AUTO: 303 10(3)UL (ref 150–450)
POTASSIUM SERPL-SCNC: 4.4 MMOL/L (ref 3.5–5.1)
RBC # BLD AUTO: 5.02 X10(6)UL (ref 3.8–5.3)
SODIUM SERPL-SCNC: 136 MMOL/L (ref 136–145)
STREP PNEUMO ANTIGEN, URINE: NEGATIVE
WBC # BLD AUTO: 6.3 X10(3) UL (ref 4–11)

## 2019-06-06 PROCEDURE — 85027 COMPLETE CBC AUTOMATED: CPT | Performed by: INTERNAL MEDICINE

## 2019-06-06 PROCEDURE — 94640 AIRWAY INHALATION TREATMENT: CPT

## 2019-06-06 PROCEDURE — 82962 GLUCOSE BLOOD TEST: CPT

## 2019-06-06 PROCEDURE — 94664 DEMO&/EVAL PT USE INHALER: CPT

## 2019-06-06 PROCEDURE — 80048 BASIC METABOLIC PNL TOTAL CA: CPT | Performed by: INTERNAL MEDICINE

## 2019-06-06 RX ORDER — PREDNISONE 20 MG/1
40 TABLET ORAL
Status: DISCONTINUED | OUTPATIENT
Start: 2019-06-07 | End: 2019-06-11

## 2019-06-06 RX ORDER — METHYLPREDNISOLONE SODIUM SUCCINATE 125 MG/2ML
60 INJECTION, POWDER, LYOPHILIZED, FOR SOLUTION INTRAMUSCULAR; INTRAVENOUS EVERY 8 HOURS
Status: COMPLETED | OUTPATIENT
Start: 2019-06-06 | End: 2019-06-06

## 2019-06-06 RX ORDER — ALPRAZOLAM 0.5 MG/1
0.5 TABLET ORAL DAILY PRN
Status: DISCONTINUED | OUTPATIENT
Start: 2019-06-06 | End: 2019-06-11

## 2019-06-06 NOTE — BH PROGRESS NOTE
[facility] Resource Referral Counselor Note    Lawerealpa Cash Patient Status:  Inpatient    3/7/1950 MRN [id number]   [facility] 3NE-A Attending Kye Soriano MD   Hosp Day # 1 PCP Tom Dozier MD       S(subjective) Patient

## 2019-06-06 NOTE — PROGRESS NOTES
[facility] Hospitalist Progress Note                                                                   [address]  3/7/1950    SUBJECTIVE:  Pt seen and examined.   States breathing and cough are Daily   • furosemide  60 mg Oral Daily   • gabapentin  100 mg Oral Daily   • Levothyroxine Sodium  137 mcg Oral Before breakfast   • Metoprolol Succinate ER  25 mg Oral Daily Beta Blocker   • Pantoprazole Sodium  20 mg Oral BID AC   • Rosuvastatin Calcium

## 2019-06-06 NOTE — PROGRESS NOTES
[address] Patient Status:  Inpatient    3/7/1950 MRN [id number]   [facility] 3NE-A Attending Khai Mosher MD   Hosp Day # 1 PCP Katelyn Matthew MD     Pulm / Critical Care Progress Note     S: pt states she is fee cooperative, No respiratory distress. Head: Normocephalic, without obvious abnormality, atraumatic. Lungs: slight rales / wheezing   Chest wall: No tenderness or deformity. Heart: Regular rate and rhythm, normal S1S2, no murmur.    Abdomen: soft, non-

## 2019-06-06 NOTE — CM/SW NOTE
ALEJANDRO paged [address] care with referral.  Liaison will meet with the patient/familyto provide choice, explanation of services, and financial disclosure.

## 2019-06-06 NOTE — PLAN OF CARE
Problem: Diabetes/Glucose Control  Goal: Glucose maintained within prescribed range  Description  INTERVENTIONS:  - Monitor Blood Glucose as ordered  - Assess for signs and symptoms of hyperglycemia and hypoglycemia  - Administer ordered medications to m replacement as ordered  - Monitor response to electrolyte replacements, including rhythm and repeat lab results as appropriate  - Fluid restriction as ordered  - Instruct patient on fluid and nutrition restrictions as appropriate  Outcome: Progressing   A/

## 2019-06-06 NOTE — PLAN OF CARE
PT on 3L NC this morning, sats 95%. States her breathing feels \"better\" BS diminished pre neb to exp wheeze post. Will continue as scheduled.

## 2019-06-06 NOTE — HOME CARE LIAISON
Received referral from Akil Pillai 55 with patient to discuss home health services. Patient is currently declining home health services. Updated [facility] via text page.  Thank you for the referral.

## 2019-06-06 NOTE — RESPIRATORY THERAPY NOTE
ASHLEY Equipment Usage Summary :            Set Mode :AUTO CP5;52          90% Pressure (EPAP) :  11          90% Insp Pressure (IPAP);           AHI : 6.3          Supplemental Oxygen :  2    LPM

## 2019-06-06 NOTE — PLAN OF CARE
A&Ox4. Wearing 2L of oxygen while awake (wears 2L PRN at home). Denies CAROLA at this time, states she is starting to feel better. Lungs clear to auscultation on the right, diminished with crackles on the left. Non-productive cough. ASHLEY with Bipap at night.  R perform as needed  - Assess and instruct to report SOB or any respiratory difficulty  - Respiratory Therapy support as indicated  - Manage/alleviate anxiety  - Monitor for signs/symptoms of CO2 retention  Outcome: Progressing     Problem: METABOLIC/FLUID A

## 2019-06-07 LAB
ANION GAP SERPL CALC-SCNC: 3 MMOL/L (ref 0–18)
BASOPHILS # BLD AUTO: 0.02 X10(3) UL (ref 0–0.2)
BASOPHILS NFR BLD AUTO: 0.2 %
BUN BLD-MCNC: 25 MG/DL (ref 7–18)
BUN/CREAT SERPL: 29.1 (ref 10–20)
CALCIUM BLD-MCNC: 10.4 MG/DL (ref 8.5–10.1)
CHLORIDE SERPL-SCNC: 104 MMOL/L (ref 98–112)
CO2 SERPL-SCNC: 32 MMOL/L (ref 21–32)
CREAT BLD-MCNC: 0.86 MG/DL (ref 0.55–1.02)
DEPRECATED RDW RBC AUTO: 47.9 FL (ref 35.1–46.3)
EOSINOPHIL # BLD AUTO: 0 X10(3) UL (ref 0–0.7)
EOSINOPHIL NFR BLD AUTO: 0 %
ERYTHROCYTE [DISTWIDTH] IN BLOOD BY AUTOMATED COUNT: 16.8 % (ref 11–15)
GLUCOSE BLD-MCNC: 197 MG/DL (ref 70–99)
GLUCOSE BLD-MCNC: 215 MG/DL (ref 70–99)
GLUCOSE BLD-MCNC: 220 MG/DL (ref 70–99)
GLUCOSE BLD-MCNC: 239 MG/DL (ref 70–99)
GLUCOSE BLD-MCNC: 334 MG/DL (ref 70–99)
HCT VFR BLD AUTO: 40.2 % (ref 35–48)
HGB BLD-MCNC: 12.1 G/DL (ref 12–16)
IMM GRANULOCYTES # BLD AUTO: 0.1 X10(3) UL (ref 0–1)
IMM GRANULOCYTES NFR BLD: 0.8 %
LYMPHOCYTES # BLD AUTO: 0.84 X10(3) UL (ref 1–4)
LYMPHOCYTES NFR BLD AUTO: 7 %
MCH RBC QN AUTO: 24.2 PG (ref 26–34)
MCHC RBC AUTO-ENTMCNC: 30.1 G/DL (ref 31–37)
MCV RBC AUTO: 80.2 FL (ref 80–100)
MONOCYTES # BLD AUTO: 0.33 X10(3) UL (ref 0.1–1)
MONOCYTES NFR BLD AUTO: 2.7 %
NEUTROPHILS # BLD AUTO: 10.75 X10 (3) UL (ref 1.5–7.7)
NEUTROPHILS # BLD AUTO: 10.75 X10(3) UL (ref 1.5–7.7)
NEUTROPHILS NFR BLD AUTO: 89.3 %
OSMOLALITY SERPL CALC.SUM OF ELEC: 299 MOSM/KG (ref 275–295)
PLATELET # BLD AUTO: 341 10(3)UL (ref 150–450)
POTASSIUM SERPL-SCNC: 4.5 MMOL/L (ref 3.5–5.1)
RBC # BLD AUTO: 5.01 X10(6)UL (ref 3.8–5.3)
SODIUM SERPL-SCNC: 139 MMOL/L (ref 136–145)
WBC # BLD AUTO: 12 X10(3) UL (ref 4–11)

## 2019-06-07 PROCEDURE — 85025 COMPLETE CBC W/AUTO DIFF WBC: CPT | Performed by: INTERNAL MEDICINE

## 2019-06-07 PROCEDURE — 94640 AIRWAY INHALATION TREATMENT: CPT

## 2019-06-07 PROCEDURE — 97161 PT EVAL LOW COMPLEX 20 MIN: CPT

## 2019-06-07 PROCEDURE — 82962 GLUCOSE BLOOD TEST: CPT

## 2019-06-07 PROCEDURE — 97530 THERAPEUTIC ACTIVITIES: CPT

## 2019-06-07 PROCEDURE — 80048 BASIC METABOLIC PNL TOTAL CA: CPT | Performed by: INTERNAL MEDICINE

## 2019-06-07 NOTE — PROGRESS NOTES
[address] Patient Status:  Inpatient    3/7/1950 MRN [id number]   [facility] 3NE-A Attending Michelle Chahal MD   Hosp Day # 2 PCP Teodora Fischer MD     Pulm / Critical Care Progress Note     S: pt improving slowly. without obvious abnormality, atraumatic. Lungs: scattered exp wheezing   Chest wall: No tenderness or deformity. Heart: Regular rate and rhythm, normal S1S2, no murmur. Abdomen: soft, non-tender, non-distended, positive BS.    Extremity: no edema

## 2019-06-07 NOTE — PROGRESS NOTES
[facility] Hospitalist Progress Note                                                                   [address]  3/7/1950    SUBJECTIVE:  Pt seen and examined.   Feels a little better today, s Inhalation Daily   • Fluticasone Propionate  2 spray Nasal Daily   • furosemide  60 mg Oral Daily   • gabapentin  100 mg Oral Daily   • Levothyroxine Sodium  137 mcg Oral Before breakfast   • Metoprolol Succinate ER  25 mg Oral Daily Beta Blocker   • Panto

## 2019-06-07 NOTE — PLAN OF CARE
Assumed care at [id number]. A&O x 4. On 2L tolerating well. Scheduled nebulizer. IV ABT per MAR. IV solumedrol. Accucheck QID. CPAP at night. PRN valium and trazadone given. NSR on tele. Staff will continue to monitor.        Problem: Diabetes/Glucose Control  Go ADULT  Goal: Electrolytes maintained within normal limits  Description  INTERVENTIONS:  - Monitor labs and rhythm and assess patient for signs and symptoms of electrolyte imbalances  - Administer electrolyte replacement as ordered  - Monitor response to el

## 2019-06-07 NOTE — PHYSICAL THERAPY NOTE
PHYSICAL THERAPY EVALUATION - INPATIENT     Room Number: 5050/2344-V  Evaluation Date: 6/7/2019  Type of Evaluation: Initial  Physician Order: PT Eval and Treat    Presenting Problem: PNA, COPD  Reason for Therapy: Mobility Dysfunction and Discharge Visual impairment     GLASSES       Past Surgical History  Past Surgical History:   Procedure Laterality Date   • APPENDECTOMY     • BENIGN BIOPSY LEFT  ?   • BIOPSY OF BREAST, INCISIONAL      multiple, all benign   • BIOPSY OF BREAST, INCISIONAL  7/9/2007 hemithyroidectomy; in [place]   • THYROIDECTOMY Right 6/11/2013    Performed by Alisia Coelho MD at [facility] MAIN OR   • TONSILLECTOMY     • UPPER GI ENDOSCOPY,BIOPSY  8/11/2009    findings: hiatal hernia, mild gastritis; bx results unknown; by Dr Muse Sor a chair (including a wheelchair)?: None   -   Need to walk in hospital room?: None   -   Climbing 3-5 steps with a railing?: A Little       AM-PAC Score:  Raw Score: 23   Approx Degree of Impairment: 11.2%   Standardized Score (AM-PAC Scale): 56.93   CMS M RECOMMENDATIONS  PT Discharge Recommendations: Home    PLAN  PT Treatment Plan: Energy conservation; Endurance; Patient education;Gait training;Stair training;Transfer training  Rehab Potential : Good  Frequency (Obs): 3x/week  Number of Visits to Meet Estab

## 2019-06-07 NOTE — PLAN OF CARE
Problem: Impaired Functional Mobility  Goal: Achieve highest/safest level of mobility/gait  Description  Interventions:  - Assess patient's functional ability and stability  - Promote increasing activity/tolerance for mobility and gait  - Educate and eng Home

## 2019-06-07 NOTE — RESPIRATORY THERAPY NOTE
ASHLEY Equipment Usage Summary :            Set Mode :AUTO BIPAP W FLEX          Usage in Hours:4;23          90% Pressure (EPAP) : 11           90% Insp Pressure (IPAP);16          AHI : 1          Supplemental Oxygen :      LPM

## 2019-06-07 NOTE — PLAN OF CARE
Pt is alert and oriented, denies any pain or shortness of breath, wheezing noted bilaterally on auscultation. Saturating well on 2L of O2 per NC, was on 3L this morning. Will continue to wean off. Ambulating in halls, steady gait. IV antiobiotics infusion i

## 2019-06-08 LAB
GLUCOSE BLD-MCNC: 163 MG/DL (ref 70–99)
GLUCOSE BLD-MCNC: 210 MG/DL (ref 70–99)
GLUCOSE BLD-MCNC: 249 MG/DL (ref 70–99)
GLUCOSE BLD-MCNC: 261 MG/DL (ref 70–99)

## 2019-06-08 PROCEDURE — 94640 AIRWAY INHALATION TREATMENT: CPT

## 2019-06-08 PROCEDURE — 82962 GLUCOSE BLOOD TEST: CPT

## 2019-06-08 RX ORDER — BENZONATATE 100 MG/1
100 CAPSULE ORAL 3 TIMES DAILY
Status: DISCONTINUED | OUTPATIENT
Start: 2019-06-08 | End: 2019-06-11

## 2019-06-08 RX ORDER — GUAIFENESIN 600 MG
600 TABLET, EXTENDED RELEASE 12 HR ORAL 2 TIMES DAILY
Status: DISCONTINUED | OUTPATIENT
Start: 2019-06-08 | End: 2019-06-11

## 2019-06-08 RX ORDER — BENZONATATE 100 MG/1
100 CAPSULE ORAL 3 TIMES DAILY PRN
Status: DISCONTINUED | OUTPATIENT
Start: 2019-06-08 | End: 2019-06-08

## 2019-06-08 NOTE — PROGRESS NOTES
[facility] Hospitalist Progress Note                                                                   [address]  3/7/1950    SUBJECTIVE:  Feels breathing hasnt turned the corner yet.   Still • furosemide  60 mg Oral Daily   • gabapentin  100 mg Oral Daily   • Levothyroxine Sodium  137 mcg Oral Before breakfast   • Metoprolol Succinate ER  25 mg Oral Daily Beta Blocker   • Pantoprazole Sodium  20 mg Oral BID AC   • Rosuvastatin Calcium  40 mg

## 2019-06-08 NOTE — PLAN OF CARE
Patient alert and oriented x4. On ra, . NSR-SB on tele. Denies pain. Dry cough. PRN tessalon. IV steroids. IV abx. Resting comfortably in bed. Will continue to monitor.       Problem: Diabetes/Glucose Control  Goal: Glucose maintained within pre maintained within normal limits  Description  INTERVENTIONS:  - Monitor labs and rhythm and assess patient for signs and symptoms of electrolyte imbalances  - Administer electrolyte replacement as ordered  - Monitor response to electrolyte replacements, in

## 2019-06-08 NOTE — RESPIRATORY THERAPY NOTE
ASHLEY : EQUIPMENT USE: DAILY SUMMARY                                            SET MODE: BIFLEX                                          USAGE IN HOURS: 5:40                                          90% EXP. PRESSURE

## 2019-06-08 NOTE — DISCHARGE SUMMARY
General Medicine Discharge Summary     Patient ID:  Leigha De La Cruz  71year old  3/7/1950    Admit date: 6/5/2019    Discharge date and time: 6/8/2019    Attending Physician: Leonard Fragoso MD Patient instructions:      Current Discharge Medication List    CONTINUE these medications which have NOT CHANGED    predniSONE 10 MG Oral Tab  4 pills daily x 3 days, then 3 pills daily x 3 days, then 2 pills daily x 3 days, then 1 pill daily x 3 da mouth before breakfast.    Fluticasone Propionate 50 MCG/ACT Nasal Suspension  2 sprays by Nasal route daily.     ACCU-CHEK FASTCLIX LANCETS Does not apply Misc  Pt to test 4 times daily for 2 weeks then will change to 2 times daily Diag E11.9    Glucose Bl

## 2019-06-08 NOTE — PROGRESS NOTES
[address] Patient Status:  Inpatient    3/7/1950 MRN [id number]   [facility] 3NE-A Attending Nelson Burns MD   Hosp Day # 3 PCP Geno Hernandes MD     SUBJECTIVE: feels somewhat better but still coughing and w Sodium (SYNTHROID, LEVOTHROID) tab 137 mcg, 137 mcg, Oral, Before breakfast  •  Metoprolol Succinate ER (Toprol XL) 24 hr tab 25 mg, 25 mg, Oral, Daily Beta Blocker  •  Pantoprazole Sodium (PROTONIX) EC tab 20 mg, 20 mg, Oral, BID AC  •  Rosuvastatin Calci follow    Ana Santillan MD  6/8/2019  3:09 PM

## 2019-06-08 NOTE — PLAN OF CARE
Assumed care at [address] [place] [address]. A&O x 4. On 2L PRN and CPAP at night. NSR on tele. PRN valium and trazadone given per [facility]. Accucheck QID. IV ABT per MAR. IV solumedrol. Patient needs O2 walk on 6/8 before discharge. Staff will continue to monitor.        Problem: Ivone METABOLIC/FLUID AND ELECTROLYTES - ADULT  Goal: Electrolytes maintained within normal limits  Description  INTERVENTIONS:  - Monitor labs and rhythm and assess patient for signs and symptoms of electrolyte imbalances  - Administer electrolyte replacement a

## 2019-06-09 LAB
GLUCOSE BLD-MCNC: 117 MG/DL (ref 70–99)
GLUCOSE BLD-MCNC: 173 MG/DL (ref 70–99)
GLUCOSE BLD-MCNC: 232 MG/DL (ref 70–99)
GLUCOSE BLD-MCNC: 299 MG/DL (ref 70–99)

## 2019-06-09 PROCEDURE — 94640 AIRWAY INHALATION TREATMENT: CPT

## 2019-06-09 PROCEDURE — 82962 GLUCOSE BLOOD TEST: CPT

## 2019-06-09 NOTE — PLAN OF CARE
Patient does not want Mucinex, claims to already suffer from dry throat and nose, states Mucinex causes it to get worse than it is worth.

## 2019-06-09 NOTE — RESPIRATORY THERAPY NOTE
ASHLEY : EQUIPMENT USE: DAILY SUMMARY                                            SET MODE: BIFLEX                                          USAGE IN HOURS: 4:04                                          90% EXP. PRESSURE

## 2019-06-09 NOTE — PROGRESS NOTES
[address] Patient Status:  Inpatient    3/7/1950 MRN [id number]   [facility] 3NE-A Attending Jaqueline Delgadillo MD   Hosp Day # 4 PCP Glendy Mays MD     [facility]. On room air.  Still has occ cough Daily  •  Levothyroxine Sodium (SYNTHROID, LEVOTHROID) tab 137 mcg, 137 mcg, Oral, Before breakfast  •  Metoprolol Succinate ER (Toprol XL) 24 hr tab 25 mg, 25 mg, Oral, Daily Beta Blocker  •  Pantoprazole Sodium (PROTONIX) EC tab 20 mg, 20 mg, Oral, BID A control  - cont Breo, incruse for spiriva  - BD protocol  4. ASHLEY/OHS  -BiPAP 16/11; had not been using at home but did use overnight here and likes her mask  -PAPNAP as OP  5.  Dispo: full code  - dc planning for tomorrow    Kacie Harper MD  6/9/2019  4:3

## 2019-06-09 NOTE — CM/SW NOTE
SW received a page from the RN stating she needs a smaller O2 concentrator. SHANNAN  Reviewed previous SW notes, it appears pt has Home O2 from [employer] [place]. SHANNAN called [facility]  regarding [employer] tank/smaller tanks.  Di Bradley stated he would send the order forms to

## 2019-06-09 NOTE — PROGRESS NOTES
[facility] Hospitalist Progress Note                                                                   [address]  3/7/1950    SUBJECTIVE:  Required 2L with exertion. No fevers.   She does no Fluticasone Propionate  2 spray Nasal Daily   • furosemide  60 mg Oral Daily   • gabapentin  100 mg Oral Daily   • Levothyroxine Sodium  137 mcg Oral Before breakfast   • Metoprolol Succinate ER  25 mg Oral Daily Beta Blocker   • Pantoprazole Sodium  20 mg

## 2019-06-09 NOTE — PROGRESS NOTES
SPO2% ON ROOM AIR AT REST: 94  SPO2% AMBULATION ON ROOM AIR: 86  SPO2% AMBULATION ON O2: 97 ON: 2 LITERS PER MINUTE

## 2019-06-10 ENCOUNTER — APPOINTMENT (OUTPATIENT)
Dept: ULTRASOUND IMAGING | Facility: HOSPITAL | Age: 69
DRG: 193 | End: 2019-06-10
Attending: INTERNAL MEDICINE
Payer: MEDICARE

## 2019-06-10 LAB
GLUCOSE BLD-MCNC: 127 MG/DL (ref 70–99)
GLUCOSE BLD-MCNC: 132 MG/DL (ref 70–99)
GLUCOSE BLD-MCNC: 197 MG/DL (ref 70–99)
GLUCOSE BLD-MCNC: 231 MG/DL (ref 70–99)

## 2019-06-10 PROCEDURE — 82962 GLUCOSE BLOOD TEST: CPT

## 2019-06-10 PROCEDURE — 94640 AIRWAY INHALATION TREATMENT: CPT

## 2019-06-10 PROCEDURE — 93971 EXTREMITY STUDY: CPT | Performed by: INTERNAL MEDICINE

## 2019-06-10 RX ORDER — GUAIFENESIN 600 MG
600 TABLET, EXTENDED RELEASE 12 HR ORAL 2 TIMES DAILY
Qty: 30 TABLET | Refills: 0 | Status: SHIPPED | OUTPATIENT
Start: 2019-06-10 | End: 2019-08-01 | Stop reason: ALTCHOICE

## 2019-06-10 RX ORDER — CEFUROXIME AXETIL 500 MG/1
500 TABLET ORAL 2 TIMES DAILY
Qty: 10 TABLET | Refills: 0 | Status: SHIPPED | OUTPATIENT
Start: 2019-06-10 | End: 2019-07-02 | Stop reason: ALTCHOICE

## 2019-06-10 RX ORDER — IPRATROPIUM BROMIDE AND ALBUTEROL SULFATE 2.5; .5 MG/3ML; MG/3ML
3 SOLUTION RESPIRATORY (INHALATION) EVERY 4 HOURS PRN
Status: DISCONTINUED | OUTPATIENT
Start: 2019-06-10 | End: 2019-06-11

## 2019-06-10 NOTE — PROGRESS NOTES
This rn called down to ultrasound and they stated they will not be able to perform her ultrasound for 3 more hours, they are aware that discharge is pending having this test. Pt updated.

## 2019-06-10 NOTE — PROGRESS NOTES
[address] Patient Status:  Inpatient    3/7/1950 MRN [id number]   [facility] 3NE-A Attending Laurie Garcia MD   [facility] Day # 5 PCP Josh Hernández MD     SUBJECTIVE: Pt complains of asymmetric left leg swelling w MCG/ACT nasal spray 2 spray, 2 spray, Nasal, Daily  •  furosemide (LASIX) tab 60 mg, 60 mg, Oral, Daily  •  gabapentin (NEURONTIN) cap 100 mg, 100 mg, Oral, Daily  •  Levothyroxine Sodium (SYNTHROID, LEVOTHROID) tab 137 mcg, 137 mcg, Oral, Before breakfast hygiene  -BDs per protocol  2.  PNA- improving  -treat for CAP- failed OP treatment with 2 rounds of PO abx  -PCT neg but given clinical improvement continue abx  -s/p 5 days of azithro/ceftriaxone- Can transition to ceftin to finish 10d course of abx  -mon

## 2019-06-10 NOTE — RESPIRATORY THERAPY NOTE
ASHLEY : EQUIPMENT USE: DAILY SUMMARY                                            SET MODE: BIFLEX                                          USAGE IN HOURS: 6:29                                          90% EXP. PRESSURE

## 2019-06-10 NOTE — DISCHARGE SUMMARY
General Medicine Discharge Summary     Patient ID:  Geraldo Nageotte  71year old  3/7/1950    Admit date: 6/5/2019    Discharge date and time: 6/11/2019    Attending Physician: Geovany Galarza MD HOSPITALIST  IP CONSULT TO SOCIAL WORK  IP CONSULT TO PHYSICAL THERAPY  IP CONSULT TO RESPIRATORY CARE    Operative Procedures:        Patient instructions:      Current Discharge Medication List    START taking these medications    guaiFENesin  MG O total) into the lungs daily. Albuterol Sulfate  (90 Base) MCG/ACT Inhalation Aero Soln  Inhale 1 puff into the lungs every 6 (six) hours as needed for Wheezing. aspirin 81 MG Oral Tab EC  Take 81 mg by mouth daily.     glimepiride 4 MG Oral Tab

## 2019-06-10 NOTE — RESPIRATORY THERAPY NOTE
Received pt on RA saturating 94%. Pt BS are clear at the top and diminished at the bottom. Pt states her breathing is feeling a lot better. Continue duoneb tx as ordered.

## 2019-06-10 NOTE — PLAN OF CARE
Resumed care at 1930  Pt A&Ox4 cooperative  VS WNL, Ra, NSR  CPAP at noc  Dry congested cough, scheduled tessalon and mucinex given  Electrolyte protocol  Up ad ban, steady gait  Accucheck QID with novolog s/s 4units given at 2100  Left [facility] SL  Call light a fluid and nutrition restrictions as appropriate  Outcome: Progressing     Problem: Impaired Functional Mobility  Goal: Achieve highest/safest level of mobility/gait  Description  Interventions:  - Assess patient's functional ability and stability  - Promot

## 2019-06-10 NOTE — PROGRESS NOTES
Patient awaiting ultrasound of the left lower extremity d/t swelling. Ultrasound department called and backed up until tonight.   Patient may discharge after having ultrasound of left lower extremity if normal.

## 2019-06-11 VITALS
OXYGEN SATURATION: 95 % | SYSTOLIC BLOOD PRESSURE: 124 MMHG | DIASTOLIC BLOOD PRESSURE: 56 MMHG | TEMPERATURE: 98 F | WEIGHT: 247 LBS | HEART RATE: 58 BPM | RESPIRATION RATE: 20 BRPM | HEIGHT: 62 IN | BODY MASS INDEX: 45.45 KG/M2

## 2019-06-11 PROBLEM — E78.5 HYPERLIPIDEMIA ASSOCIATED WITH TYPE 2 DIABETES MELLITUS (HCC): Status: ACTIVE | Noted: 2019-06-11

## 2019-06-11 PROBLEM — E11.69 HYPERLIPIDEMIA ASSOCIATED WITH TYPE 2 DIABETES MELLITUS (HCC): Status: ACTIVE | Noted: 2019-06-11

## 2019-06-11 LAB
GLUCOSE BLD-MCNC: 117 MG/DL (ref 70–99)
GLUCOSE BLD-MCNC: 204 MG/DL (ref 70–99)

## 2019-06-11 PROCEDURE — 82962 GLUCOSE BLOOD TEST: CPT

## 2019-06-11 RX ORDER — PREDNISONE 10 MG/1
TABLET ORAL
Qty: 18 TABLET | Refills: 0 | Status: SHIPPED | OUTPATIENT
Start: 2019-06-11 | End: 2019-06-20 | Stop reason: ALTCHOICE

## 2019-06-11 RX ORDER — DILTIAZEM HYDROCHLORIDE 300 MG/1
300 CAPSULE, COATED, EXTENDED RELEASE ORAL DAILY
Qty: 30 CAPSULE | Refills: 3 | Status: SHIPPED | OUTPATIENT
Start: 2019-06-11 | End: 2019-08-23

## 2019-06-11 RX ORDER — BENZONATATE 100 MG/1
100 CAPSULE ORAL 3 TIMES DAILY
Qty: 30 CAPSULE | Refills: 1 | Status: SHIPPED | OUTPATIENT
Start: 2019-06-11 | End: 2019-07-02 | Stop reason: ALTCHOICE

## 2019-06-11 NOTE — PLAN OF CARE
NURSING DISCHARGE NOTE    Discharged Home via Wheelchair. Accompanied by Support staff  Belongings Taken by patient/family.       Patient educated on medications, s/s to monitor for, follow up appointments, and when to call [place] for [place] faxed to cough, deep breathe, Incentive Spirometry  - Assess the need for suctioning and perform as needed  - Assess and instruct to report SOB or any respiratory difficulty  - Respiratory Therapy support as indicated  - Manage/alleviate anxiety  - Monitor for sign

## 2019-06-11 NOTE — RESPIRATORY THERAPY NOTE
ASHLEY Equipment Usage Summary :            Set Mode :BIPAP W FLEX          Usage in Hours:5;27          90% Pressure (EPAP) : 11           90% Insp Pressure (IPAP);16          AHI : 1.4          Supplemental Oxygen :   3   LPM

## 2019-06-11 NOTE — PLAN OF CARE
Problem: Diabetes/Glucose Control  Goal: Glucose maintained within prescribed range  Description  INTERVENTIONS:  - Monitor Blood Glucose as ordered  - Assess for signs and symptoms of hyperglycemia and hypoglycemia  - Administer ordered medications to m limits  Description  INTERVENTIONS:  - Monitor labs and rhythm and assess patient for signs and symptoms of electrolyte imbalances  - Administer electrolyte replacement as ordered  - Monitor response to electrolyte replacements, including rhythm and repeat

## 2019-06-11 NOTE — PAYOR COMM NOTE
--------------  ADMISSION REVIEW         6/5    ED    Dyspnea SHANNON SOB      Stated Complaint: shannon     HPI     61-year-old female presents to the emergency department after seeing her pulmonologist today.   Patient has a known history of asthma COPD and state Absolute 1.28 (*)             EKG     Rate, intervals and axes as noted on EKG Report. Rate: 54  Rhythm: Sinus Rhythm  Reading: Right bundle branch block no acute ST segment changes    CXR  CONCLUSION:  1. New increased markings at the left lung base.   Lacey Thorpe

## 2019-06-11 NOTE — PROGRESS NOTES
[address] Patient Status:  Inpatient    3/7/1950 MRN [id number]   [facility] 3NE-A Attending David Caballero MD   Hosp Day # 6 PCP Tabby Sarmiento MD     SUBJECTIVE: Pt denies complaints today.       OBJECTIVE:  B Daily  •  gabapentin (NEURONTIN) cap 100 mg, 100 mg, Oral, Daily  •  Levothyroxine Sodium (SYNTHROID, LEVOTHROID) tab 137 mcg, 137 mcg, Oral, Before breakfast  •  Metoprolol Succinate ER (Toprol XL) 24 hr tab 25 mg, 25 mg, Oral, Daily Beta Blocker  •  Pant PO abx  -PCT neg but given clinical improvement continue abx  -s/p 5 days of azithro/ceftriaxone- Can transition to ceftin to finish 10d course of abx  -monitor cultures- NGTD  -adjust abx as indicated  3.  COPD ex- overall better.   - oral prednisone taper

## 2019-06-11 NOTE — PROGRESS NOTES
[facility] Hospitalist Progress Note                                                                   [address]  3/7/1950    SUBJECTIVE:  Has Left lower ext swelling.       OBJECTIVE:  Temp: mg Oral Daily   • Levothyroxine Sodium  137 mcg Oral Before breakfast   • Metoprolol Succinate ER  25 mg Oral Daily Beta Blocker   • Pantoprazole Sodium  20 mg Oral BID AC   • Rosuvastatin Calcium  40 mg Oral Nightly     Continuous Infusions:   PRN: ipratr

## 2019-06-12 PROCEDURE — 82785 ASSAY OF IGE: CPT | Performed by: INTERNAL MEDICINE

## 2019-06-12 PROCEDURE — 36415 COLL VENOUS BLD VENIPUNCTURE: CPT | Performed by: INTERNAL MEDICINE

## 2019-06-12 PROCEDURE — 82784 ASSAY IGA/IGD/IGG/IGM EACH: CPT | Performed by: INTERNAL MEDICINE

## 2019-07-23 ENCOUNTER — CARDPULM VISIT (OUTPATIENT)
Dept: CARDIAC REHAB | Facility: HOSPITAL | Age: 69
End: 2019-07-23
Attending: INTERNAL MEDICINE
Payer: MEDICARE

## 2019-07-26 RX ORDER — DIPHENHYDRAMINE HCL 25 MG
25 CAPSULE ORAL EVERY 6 HOURS PRN
COMMUNITY
End: 2019-10-22 | Stop reason: ALTCHOICE

## 2019-07-29 ENCOUNTER — CARDPULM VISIT (OUTPATIENT)
Dept: CARDIAC REHAB | Facility: HOSPITAL | Age: 69
End: 2019-07-29
Attending: INTERNAL MEDICINE
Payer: MEDICARE

## 2019-07-31 ENCOUNTER — APPOINTMENT (OUTPATIENT)
Dept: CARDIAC REHAB | Facility: HOSPITAL | Age: 69
End: 2019-07-31
Attending: INTERNAL MEDICINE
Payer: MEDICARE

## 2019-08-02 ENCOUNTER — APPOINTMENT (OUTPATIENT)
Dept: CARDIAC REHAB | Facility: HOSPITAL | Age: 69
End: 2019-08-02
Attending: INTERNAL MEDICINE
Payer: MEDICARE

## 2019-08-05 ENCOUNTER — CARDPULM VISIT (OUTPATIENT)
Dept: CARDIAC REHAB | Facility: HOSPITAL | Age: 69
End: 2019-08-05
Attending: INTERNAL MEDICINE
Payer: MEDICARE

## 2019-08-07 ENCOUNTER — APPOINTMENT (OUTPATIENT)
Dept: CARDIAC REHAB | Facility: HOSPITAL | Age: 69
End: 2019-08-07
Attending: INTERNAL MEDICINE
Payer: MEDICARE

## 2019-08-09 ENCOUNTER — CARDPULM VISIT (OUTPATIENT)
Dept: CARDIAC REHAB | Facility: HOSPITAL | Age: 69
End: 2019-08-09
Attending: INTERNAL MEDICINE
Payer: MEDICARE

## 2019-08-12 ENCOUNTER — CARDPULM VISIT (OUTPATIENT)
Dept: CARDIAC REHAB | Facility: HOSPITAL | Age: 69
End: 2019-08-12
Attending: INTERNAL MEDICINE
Payer: MEDICARE

## 2019-08-14 ENCOUNTER — CARDPULM VISIT (OUTPATIENT)
Dept: CARDIAC REHAB | Facility: HOSPITAL | Age: 69
End: 2019-08-14
Attending: INTERNAL MEDICINE
Payer: MEDICARE

## 2019-08-14 PROCEDURE — 93798 PHYS/QHP OP CAR RHAB W/ECG: CPT

## 2019-08-21 ENCOUNTER — CARDPULM VISIT (OUTPATIENT)
Dept: CARDIAC REHAB | Facility: HOSPITAL | Age: 69
End: 2019-08-21
Attending: INTERNAL MEDICINE
Payer: MEDICARE

## 2019-08-23 ENCOUNTER — CARDPULM VISIT (OUTPATIENT)
Dept: CARDIAC REHAB | Facility: HOSPITAL | Age: 69
End: 2019-08-23
Attending: INTERNAL MEDICINE
Payer: MEDICARE

## 2019-08-30 ENCOUNTER — CARDPULM VISIT (OUTPATIENT)
Dept: CARDIAC REHAB | Facility: HOSPITAL | Age: 69
End: 2019-08-30
Attending: INTERNAL MEDICINE
Payer: MEDICARE

## 2019-09-04 ENCOUNTER — CARDPULM VISIT (OUTPATIENT)
Dept: CARDIAC REHAB | Facility: HOSPITAL | Age: 69
End: 2019-09-04
Attending: INTERNAL MEDICINE
Payer: MEDICARE

## 2019-09-06 ENCOUNTER — CARDPULM VISIT (OUTPATIENT)
Dept: CARDIAC REHAB | Facility: HOSPITAL | Age: 69
End: 2019-09-06
Attending: INTERNAL MEDICINE
Payer: MEDICARE

## 2019-09-09 ENCOUNTER — CARDPULM VISIT (OUTPATIENT)
Dept: CARDIAC REHAB | Facility: HOSPITAL | Age: 69
End: 2019-09-09
Attending: INTERNAL MEDICINE
Payer: MEDICARE

## 2019-09-11 ENCOUNTER — CARDPULM VISIT (OUTPATIENT)
Dept: CARDIAC REHAB | Facility: HOSPITAL | Age: 69
End: 2019-09-11
Attending: INTERNAL MEDICINE
Payer: MEDICARE

## 2019-09-16 PROCEDURE — 86800 THYROGLOBULIN ANTIBODY: CPT | Performed by: INTERNAL MEDICINE

## 2019-09-18 ENCOUNTER — CARDPULM VISIT (OUTPATIENT)
Dept: CARDIAC REHAB | Facility: HOSPITAL | Age: 69
End: 2019-09-18
Attending: INTERNAL MEDICINE
Payer: MEDICARE

## 2019-10-02 ENCOUNTER — APPOINTMENT (OUTPATIENT)
Dept: CARDIAC REHAB | Facility: HOSPITAL | Age: 69
End: 2019-10-02
Attending: INTERNAL MEDICINE
Payer: MEDICARE

## 2019-10-04 ENCOUNTER — APPOINTMENT (OUTPATIENT)
Dept: CARDIAC REHAB | Facility: HOSPITAL | Age: 69
End: 2019-10-04
Attending: INTERNAL MEDICINE
Payer: MEDICARE

## 2019-10-07 ENCOUNTER — APPOINTMENT (OUTPATIENT)
Dept: CARDIAC REHAB | Facility: HOSPITAL | Age: 69
End: 2019-10-07
Attending: INTERNAL MEDICINE
Payer: MEDICARE

## 2019-10-09 ENCOUNTER — APPOINTMENT (OUTPATIENT)
Dept: CARDIAC REHAB | Facility: HOSPITAL | Age: 69
End: 2019-10-09
Attending: INTERNAL MEDICINE
Payer: MEDICARE

## 2019-10-11 ENCOUNTER — APPOINTMENT (OUTPATIENT)
Dept: CARDIAC REHAB | Facility: HOSPITAL | Age: 69
End: 2019-10-11
Attending: INTERNAL MEDICINE
Payer: MEDICARE

## 2019-10-14 ENCOUNTER — APPOINTMENT (OUTPATIENT)
Dept: CARDIAC REHAB | Facility: HOSPITAL | Age: 69
End: 2019-10-14
Attending: INTERNAL MEDICINE
Payer: MEDICARE

## 2019-10-16 PROBLEM — M17.12 PRIMARY OSTEOARTHRITIS OF LEFT KNEE: Status: ACTIVE | Noted: 2019-10-16

## 2019-10-16 PROBLEM — M79.89 LEFT LEG SWELLING: Status: ACTIVE | Noted: 2019-10-16

## 2019-10-23 ENCOUNTER — APPOINTMENT (OUTPATIENT)
Dept: CARDIAC REHAB | Facility: HOSPITAL | Age: 69
End: 2019-10-23
Attending: INTERNAL MEDICINE
Payer: MEDICARE

## 2019-10-25 ENCOUNTER — APPOINTMENT (OUTPATIENT)
Dept: CARDIAC REHAB | Facility: HOSPITAL | Age: 69
End: 2019-10-25
Attending: INTERNAL MEDICINE
Payer: MEDICARE

## 2019-10-28 ENCOUNTER — ANESTHESIA EVENT (OUTPATIENT)
Dept: SURGERY | Facility: HOSPITAL | Age: 69
End: 2019-10-28
Payer: MEDICARE

## 2019-10-30 ENCOUNTER — ANESTHESIA (OUTPATIENT)
Dept: SURGERY | Facility: HOSPITAL | Age: 69
End: 2019-10-30
Payer: MEDICARE

## 2019-10-30 ENCOUNTER — HOSPITAL ENCOUNTER (OUTPATIENT)
Facility: HOSPITAL | Age: 69
Setting detail: HOSPITAL OUTPATIENT SURGERY
Discharge: HOME | End: 2019-10-30
Attending: ORTHOPAEDIC SURGERY | Admitting: ORTHOPAEDIC SURGERY
Payer: MEDICARE

## 2019-10-30 VITALS
OXYGEN SATURATION: 96 % | BODY MASS INDEX: 47.87 KG/M2 | SYSTOLIC BLOOD PRESSURE: 117 MMHG | TEMPERATURE: 97 F | DIASTOLIC BLOOD PRESSURE: 65 MMHG | RESPIRATION RATE: 16 BRPM | WEIGHT: 260.13 LBS | HEART RATE: 66 BPM | HEIGHT: 62 IN

## 2019-10-30 DIAGNOSIS — M65.341 TRIGGER FINGER, RIGHT RING FINGER: ICD-10-CM

## 2019-10-30 DIAGNOSIS — G56.01 CARPAL TUNNEL SYNDROME ON RIGHT: ICD-10-CM

## 2019-10-30 PROCEDURE — 82962 GLUCOSE BLOOD TEST: CPT

## 2019-10-30 PROCEDURE — 0LN70ZZ RELEASE RIGHT HAND TENDON, OPEN APPROACH: ICD-10-PCS | Performed by: ORTHOPAEDIC SURGERY

## 2019-10-30 PROCEDURE — 01N50ZZ RELEASE MEDIAN NERVE, OPEN APPROACH: ICD-10-PCS | Performed by: ORTHOPAEDIC SURGERY

## 2019-10-30 RX ORDER — BUPIVACAINE HYDROCHLORIDE 5 MG/ML
INJECTION, SOLUTION EPIDURAL; INTRACAUDAL AS NEEDED
Status: DISCONTINUED | OUTPATIENT
Start: 2019-10-30 | End: 2019-10-30 | Stop reason: HOSPADM

## 2019-10-30 RX ORDER — LIDOCAINE HYDROCHLORIDE 10 MG/ML
INJECTION, SOLUTION INFILTRATION; PERINEURAL AS NEEDED
Status: DISCONTINUED | OUTPATIENT
Start: 2019-10-30 | End: 2019-10-30 | Stop reason: HOSPADM

## 2019-10-30 RX ORDER — SODIUM CHLORIDE, SODIUM LACTATE, POTASSIUM CHLORIDE, CALCIUM CHLORIDE 600; 310; 30; 20 MG/100ML; MG/100ML; MG/100ML; MG/100ML
INJECTION, SOLUTION INTRAVENOUS CONTINUOUS
Status: DISCONTINUED | OUTPATIENT
Start: 2019-10-30 | End: 2019-10-30

## 2019-10-30 RX ORDER — CEFAZOLIN SODIUM/WATER 2 G/20 ML
2 SYRINGE (ML) INTRAVENOUS ONCE
Status: COMPLETED | OUTPATIENT
Start: 2019-10-30 | End: 2019-10-30

## 2019-10-30 RX ORDER — CEFAZOLIN SODIUM/WATER 2 G/20 ML
SYRINGE (ML) INTRAVENOUS
Status: DISCONTINUED
Start: 2019-10-30 | End: 2019-10-30

## 2019-10-30 RX ORDER — KETAMINE HYDROCHLORIDE 50 MG/ML
INJECTION, SOLUTION, CONCENTRATE INTRAMUSCULAR; INTRAVENOUS AS NEEDED
Status: DISCONTINUED | OUTPATIENT
Start: 2019-10-30 | End: 2019-10-30 | Stop reason: SURG

## 2019-10-30 RX ORDER — ACETAMINOPHEN 500 MG
1000 TABLET ORAL ONCE
Status: DISCONTINUED | OUTPATIENT
Start: 2019-10-30 | End: 2019-10-30

## 2019-10-30 RX ORDER — ACETAMINOPHEN 500 MG
1000 TABLET ORAL ONCE
Status: ON HOLD | COMMUNITY
End: 2020-01-15

## 2019-10-30 RX ORDER — GLYCOPYRROLATE 0.2 MG/ML
INJECTION, SOLUTION INTRAMUSCULAR; INTRAVENOUS AS NEEDED
Status: DISCONTINUED | OUTPATIENT
Start: 2019-10-30 | End: 2019-10-30 | Stop reason: SURG

## 2019-10-30 RX ORDER — NALOXONE HYDROCHLORIDE 0.4 MG/ML
80 INJECTION, SOLUTION INTRAMUSCULAR; INTRAVENOUS; SUBCUTANEOUS AS NEEDED
Status: DISCONTINUED | OUTPATIENT
Start: 2019-10-30 | End: 2019-10-30

## 2019-10-30 RX ORDER — MIDAZOLAM HYDROCHLORIDE 1 MG/ML
INJECTION INTRAMUSCULAR; INTRAVENOUS AS NEEDED
Status: DISCONTINUED | OUTPATIENT
Start: 2019-10-30 | End: 2019-10-30 | Stop reason: SURG

## 2019-10-30 RX ORDER — INSULIN ASPART 100 [IU]/ML
INJECTION, SOLUTION INTRAVENOUS; SUBCUTANEOUS ONCE
Status: DISCONTINUED | OUTPATIENT
Start: 2019-10-30 | End: 2019-10-30

## 2019-10-30 RX ORDER — HYDROCODONE BITARTRATE AND ACETAMINOPHEN 5; 325 MG/1; MG/1
1 TABLET ORAL EVERY 6 HOURS PRN
Qty: 5 TABLET | Refills: 0 | Status: ON HOLD | OUTPATIENT
Start: 2019-10-30 | End: 2020-01-15

## 2019-10-30 RX ORDER — DEXTROSE MONOHYDRATE 25 G/50ML
50 INJECTION, SOLUTION INTRAVENOUS
Status: DISCONTINUED | OUTPATIENT
Start: 2019-10-30 | End: 2019-10-30

## 2019-10-30 RX ADMIN — SODIUM CHLORIDE, SODIUM LACTATE, POTASSIUM CHLORIDE, CALCIUM CHLORIDE: 600; 310; 30; 20 INJECTION, SOLUTION INTRAVENOUS at 13:12:00

## 2019-10-30 RX ADMIN — MIDAZOLAM HYDROCHLORIDE 2 MG: 1 INJECTION INTRAMUSCULAR; INTRAVENOUS at 12:23:00

## 2019-10-30 RX ADMIN — CEFAZOLIN SODIUM/WATER 2 G: 2 G/20 ML SYRINGE (ML) INTRAVENOUS at 12:31:00

## 2019-10-30 RX ADMIN — KETAMINE HYDROCHLORIDE 15 MG: 50 INJECTION, SOLUTION, CONCENTRATE INTRAMUSCULAR; INTRAVENOUS at 12:23:00

## 2019-10-30 RX ADMIN — GLYCOPYRROLATE 0.1 MG: 0.2 INJECTION, SOLUTION INTRAMUSCULAR; INTRAVENOUS at 12:23:00

## 2019-10-30 NOTE — ANESTHESIA PREPROCEDURE EVALUATION
PRE-OP EVALUATION    Patient Name: Shanika Gore    Pre-op Diagnosis: Carpal tunnel syndrome on right [G56.01]  Trigger finger, right ring finger [M65.341]    Procedure(s):  ENDOSCOPIC VERSUS OPEN CARPAL TUNNEL RELEASE RIGHT WRIST AND RING FINGER TRIGGER lungs every 4 (four) hours as needed for Wheezing., Disp: 1 Inhaler, Rfl: 6  OMEPRAZOLE 20 MG Oral Capsule Delayed Release, TAKE ONE CAPSULE BY MOUTH TWICE DAILY BEFORE MEALS, Disp: 180 capsule, Rfl: 1  Fluticasone Furoate-Vilanterol (BREO ELLIPTA) 200-25 Laterality Date   • APPENDECTOMY     • BENIGN BIOPSY LEFT  ?   • BIOPSY OF BREAST, INCISIONAL      multiple, all benign   • BIOPSY OF BREAST, INCISIONAL  7/9/2007    findings: benign; by Dr Nayla Maravilla in [address] [place] [address] N/A 2/18/2016    Perform at [facility] MAIN OR   • TONSILLECTOMY     • UPPER GI ENDOSCOPY,BIOPSY  8/11/2009    findings: hiatal hernia, mild gastritis; bx results unknown; by Dr Berny King   • XI ROBOT-ASSISTED [address] N/A 5/14/2018    Performed by YANNICK Barajas

## 2019-10-30 NOTE — OPERATIVE REPORT
OPERATIVE NOTE    Patient Name: Diann Jernigan  Age:  71year old  Sex: female  MRN: [id number]  : 3/7/1950  Date of Admission: 10/30/2019  Date of Surgery: 10/30/19  Primary Surgeon: Sangeeta Goldstein MD  Assistant(s): None    PREOPERATIVE DIAGNOSIS:  Right draped in the usual orthopedic fashion. Time-out was performed with the OR staff and correct limb/site, informed consent and site markings were confirmed.      A surgical incision was made over the A1 pulley of the right ring finger from distal palmar creas release was marked out on the hand based off of the monterroso's line, the distal palmar crease and the 4th ray. Once the incision was marked out, the skin was incised and hemostasis was achieved with bipolar cautery.  The soft tissue was sharply incised down t

## 2019-10-30 NOTE — H&P
ORTHOPEDIC SURGERY CLINIC H+P     Patient Shunwastmary jo 3  Date of Appointment: 10/30/2019  Chief Complaint: No chief complaint on file.       HPI:    The patient is a 71year old year old female     Patient with 1-2 months of worsening daytime and nigh localized, s/p resection   • Visual impairment     glasses       Past Surgical History  Past Surgical History:   Procedure Laterality Date   • APPENDECTOMY     • BENIGN BIOPSY LEFT  ?   • BIOPSY OF BREAST, INCISIONAL      multiple, all benign   • BIOPSY OF LOBECTOMY,UNILAT  abt 1982    left hemithyroidectomy; in [place]   • THYROIDECTOMY Right 6/11/2013    Performed by Amador Pinto MD at [facility] MAIN OR   • TONSILLECTOMY     • UPPER GI ENDOSCOPY,BIOPSY  8/11/2009    findings: hiatal hernia, mild gastritis; of Systems otherwise negative     Physical Exam:      10/30/19  1138   BP: (!) 128/91   Pulse: 80   Resp: 18   Temp: 98.1 °F (36.7 °C)      The patient is normocephalic and atraumatic  Extraocular Muscles are Intact  Tongue is midline  Skin intact on upper

## 2019-10-30 NOTE — ANESTHESIA POSTPROCEDURE EVALUATION
[address] Patient Status:  Hospital Outpatient Surgery   Age/Gender 71year old female MRN [id number]   [facility] SURGERY Attending Femi Alvarez, [address] [facility] Se Day # 0 PCP Timothy Brewer MD       Anesthesia Post-op Note

## 2019-11-14 PROBLEM — M65.341 TRIGGER FINGER, RIGHT RING FINGER: Status: ACTIVE | Noted: 2019-11-14

## 2019-11-19 PROBLEM — E78.00 PURE HYPERCHOLESTEROLEMIA: Status: ACTIVE | Noted: 2019-11-19

## 2019-11-19 PROBLEM — S83.272A COMPLEX TEAR OF LATERAL MENISCUS OF LEFT KNEE AS CURRENT INJURY: Status: ACTIVE | Noted: 2019-11-19

## 2019-11-19 PROBLEM — Z79.01 LONG TERM CURRENT USE OF ANTICOAGULANT: Status: ACTIVE | Noted: 2019-11-19

## 2019-11-19 PROBLEM — M79.604 PAIN IN BOTH LOWER EXTREMITIES: Status: ACTIVE | Noted: 2019-11-19

## 2019-11-19 PROBLEM — M79.605 PAIN IN BOTH LOWER EXTREMITIES: Status: ACTIVE | Noted: 2019-11-19

## 2019-12-03 PROBLEM — M17.11 PRIMARY OSTEOARTHRITIS OF RIGHT KNEE: Status: ACTIVE | Noted: 2019-12-03

## 2020-01-12 ENCOUNTER — HOSPITAL ENCOUNTER (EMERGENCY)
Facility: HOSPITAL | Age: 70
Discharge: HOME | End: 2020-01-12
Attending: EMERGENCY MEDICINE
Payer: MEDICARE

## 2020-01-12 ENCOUNTER — APPOINTMENT (OUTPATIENT)
Dept: CT IMAGING | Facility: HOSPITAL | Age: 70
End: 2020-01-12
Attending: EMERGENCY MEDICINE
Payer: MEDICARE

## 2020-01-12 VITALS
RESPIRATION RATE: 16 BRPM | HEART RATE: 62 BPM | OXYGEN SATURATION: 95 % | SYSTOLIC BLOOD PRESSURE: 164 MMHG | BODY MASS INDEX: 47.47 KG/M2 | WEIGHT: 257.94 LBS | DIASTOLIC BLOOD PRESSURE: 80 MMHG | HEIGHT: 62 IN | TEMPERATURE: 98 F

## 2020-01-12 DIAGNOSIS — H15.102 EPISCLERITIS OF LEFT EYE: Primary | ICD-10-CM

## 2020-01-12 LAB
ALBUMIN SERPL-MCNC: 3.8 G/DL (ref 3.4–5)
ALBUMIN/GLOB SERPL: 0.8 {RATIO} (ref 1–2)
ALP LIVER SERPL-CCNC: 104 U/L (ref 55–142)
ALT SERPL-CCNC: 13 U/L (ref 13–56)
ANION GAP SERPL CALC-SCNC: 5 MMOL/L (ref 0–18)
AST SERPL-CCNC: 27 U/L (ref 15–37)
BASOPHILS # BLD AUTO: 0.04 X10(3) UL (ref 0–0.2)
BASOPHILS NFR BLD AUTO: 0.5 %
BILIRUB SERPL-MCNC: 0.6 MG/DL (ref 0.1–2)
BUN BLD-MCNC: 11 MG/DL (ref 7–18)
BUN/CREAT SERPL: 13.3 (ref 10–20)
CALCIUM BLD-MCNC: 10.4 MG/DL (ref 8.5–10.1)
CHLORIDE SERPL-SCNC: 105 MMOL/L (ref 98–112)
CO2 SERPL-SCNC: 29 MMOL/L (ref 21–32)
CREAT BLD-MCNC: 0.83 MG/DL (ref 0.55–1.02)
DEPRECATED RDW RBC AUTO: 47.3 FL (ref 35.1–46.3)
EOSINOPHIL # BLD AUTO: 0.12 X10(3) UL (ref 0–0.7)
EOSINOPHIL NFR BLD AUTO: 1.4 %
ERYTHROCYTE [DISTWIDTH] IN BLOOD BY AUTOMATED COUNT: 16.6 % (ref 11–15)
GLOBULIN PLAS-MCNC: 4.8 G/DL (ref 2.8–4.4)
GLUCOSE BLD-MCNC: 93 MG/DL (ref 70–99)
HCT VFR BLD AUTO: 42.4 % (ref 35–48)
HGB BLD-MCNC: 12.9 G/DL (ref 12–16)
IMM GRANULOCYTES # BLD AUTO: 0.07 X10(3) UL (ref 0–1)
IMM GRANULOCYTES NFR BLD: 0.8 %
LYMPHOCYTES # BLD AUTO: 1.22 X10(3) UL (ref 1–4)
LYMPHOCYTES NFR BLD AUTO: 14.3 %
M PROTEIN MFR SERPL ELPH: 8.6 G/DL (ref 6.4–8.2)
MCH RBC QN AUTO: 24.5 PG (ref 26–34)
MCHC RBC AUTO-ENTMCNC: 30.4 G/DL (ref 31–37)
MCV RBC AUTO: 80.6 FL (ref 80–100)
MONOCYTES # BLD AUTO: 0.92 X10(3) UL (ref 0.1–1)
MONOCYTES NFR BLD AUTO: 10.7 %
NEUTROPHILS # BLD AUTO: 6.19 X10 (3) UL (ref 1.5–7.7)
NEUTROPHILS # BLD AUTO: 6.19 X10(3) UL (ref 1.5–7.7)
NEUTROPHILS NFR BLD AUTO: 72.3 %
OSMOLALITY SERPL CALC.SUM OF ELEC: 287 MOSM/KG (ref 275–295)
PLATELET # BLD AUTO: 307 10(3)UL (ref 150–450)
POTASSIUM SERPL-SCNC: 4.3 MMOL/L (ref 3.5–5.1)
RBC # BLD AUTO: 5.26 X10(6)UL (ref 3.8–5.3)
SODIUM SERPL-SCNC: 139 MMOL/L (ref 136–145)
WBC # BLD AUTO: 8.6 X10(3) UL (ref 4–11)

## 2020-01-12 PROCEDURE — 99285 EMERGENCY DEPT VISIT HI MDM: CPT

## 2020-01-12 PROCEDURE — 36415 COLL VENOUS BLD VENIPUNCTURE: CPT

## 2020-01-12 PROCEDURE — 70450 CT HEAD/BRAIN W/O DYE: CPT | Performed by: EMERGENCY MEDICINE

## 2020-01-12 PROCEDURE — 70480 CT ORBIT/EAR/FOSSA W/O DYE: CPT | Performed by: EMERGENCY MEDICINE

## 2020-01-12 PROCEDURE — 80053 COMPREHEN METABOLIC PANEL: CPT | Performed by: EMERGENCY MEDICINE

## 2020-01-12 PROCEDURE — 85025 COMPLETE CBC W/AUTO DIFF WBC: CPT | Performed by: EMERGENCY MEDICINE

## 2020-01-12 RX ORDER — ONDANSETRON 4 MG/1
4 TABLET, ORALLY DISINTEGRATING ORAL EVERY 4 HOURS PRN
Qty: 10 TABLET | Refills: 0 | Status: SHIPPED | OUTPATIENT
Start: 2020-01-12 | End: 2020-01-19

## 2020-01-12 RX ORDER — PREDNISONE 20 MG/1
40 TABLET ORAL DAILY
Qty: 10 TABLET | Refills: 0 | Status: ON HOLD | OUTPATIENT
Start: 2020-01-12 | End: 2020-01-17

## 2020-01-12 RX ORDER — PREDNISONE 20 MG/1
40 TABLET ORAL ONCE
Status: COMPLETED | OUTPATIENT
Start: 2020-01-12 | End: 2020-01-12

## 2020-01-12 NOTE — ED PROVIDER NOTES
Patient Seen in: [facility] Emergency Department      History   Patient presents with:   Eye Visual Problem    Stated Complaint: \"eye\" is twisted loss of occular motor    HPI    69-year-old female comes in the hospital complaint of having difficult Osteoarthritis knees    • Peripheral vascular disease (HCC)     varicose veins   • Pneumonia, organism unspecified(486)    • Problems with swallowing     Due to nodules on thyroid RESOLVED WITH SURGERY   • Reflux    • Renal disorder    • Sleep apnea     cp granulomatis pneuomonitis   • REPAIR UMBILICAL UNPK,5+P/E,MGNLA  7/9/2007    by Dr Dio Mario in [place]   • [address] 16 Right 4/4/2018    Performed by Nohelia Au MD at [address] [place] [address] normal motion, PEERL, throat clear, neck supple, no JVD, trachea midline, No LAD  Heart: S1S2 normal. No murmurs, regular rate and rhythm  Lungs: Clear to auscultation bilaterally  Abdomen: Soft nontender nondistended normal active bowel sounds without chelsie VENTRICLES/SULCI:  Ventricles and sulci are normal in size. INTRACRANIAL:  Moderate low-attenuation within the periventricular and subcortical white matter consistent small vessel ischemic disease. There are no abnormal extraaxial fluid collections.   The mole gland. Question if there is any inflammation or symptoms  of inflammation or infection in the lack mole gland. Correlation with the physical exam and clinical history is recommended. INTRACONAL SPACE:  No visible mass, with normal retrobulbar fat. every 4 (four) hours as needed.   Qty: 10 tablet Refills: 0

## 2020-01-12 NOTE — ED INITIAL ASSESSMENT (HPI)
Saw eye doctor 2 days ago, told infection in lt eye,, now has issue with rt eye States dizzy when both eyes are open,  States rt eye gazes to right.   Denies injury

## 2020-01-15 ENCOUNTER — HOSPITAL ENCOUNTER (INPATIENT)
Facility: HOSPITAL | Age: 70
LOS: 2 days | Discharge: HOME HEALTH | DRG: 123 | End: 2020-01-17
Attending: EMERGENCY MEDICINE | Admitting: HOSPITALIST
Payer: MEDICARE

## 2020-01-15 ENCOUNTER — APPOINTMENT (OUTPATIENT)
Dept: MRI IMAGING | Facility: HOSPITAL | Age: 70
DRG: 123 | End: 2020-01-15
Attending: HOSPITALIST
Payer: MEDICARE

## 2020-01-15 ENCOUNTER — APPOINTMENT (OUTPATIENT)
Dept: GENERAL RADIOLOGY | Facility: HOSPITAL | Age: 70
DRG: 123 | End: 2020-01-15
Attending: HOSPITALIST
Payer: MEDICARE

## 2020-01-15 DIAGNOSIS — H49.02 LEFT OCULOMOTOR NERVE PALSY: Primary | ICD-10-CM

## 2020-01-15 LAB
ALBUMIN SERPL-MCNC: 3.5 G/DL (ref 3.4–5)
ALBUMIN/GLOB SERPL: 0.7 {RATIO} (ref 1–2)
ALP LIVER SERPL-CCNC: 95 U/L (ref 55–142)
ALT SERPL-CCNC: 18 U/L (ref 13–56)
ANION GAP SERPL CALC-SCNC: 5 MMOL/L (ref 0–18)
AST SERPL-CCNC: 15 U/L (ref 15–37)
BASOPHILS # BLD AUTO: 0.05 X10(3) UL (ref 0–0.2)
BASOPHILS NFR BLD AUTO: 0.4 %
BILIRUB SERPL-MCNC: 0.4 MG/DL (ref 0.1–2)
BUN BLD-MCNC: 19 MG/DL (ref 7–18)
BUN/CREAT SERPL: 17 (ref 10–20)
CALCIUM BLD-MCNC: 9.7 MG/DL (ref 8.5–10.1)
CHLORIDE SERPL-SCNC: 103 MMOL/L (ref 98–112)
CO2 SERPL-SCNC: 30 MMOL/L (ref 21–32)
CREAT BLD-MCNC: 1.12 MG/DL (ref 0.55–1.02)
DEPRECATED RDW RBC AUTO: 49.3 FL (ref 35.1–46.3)
EOSINOPHIL # BLD AUTO: 0.12 X10(3) UL (ref 0–0.7)
EOSINOPHIL NFR BLD AUTO: 1 %
ERYTHROCYTE [DISTWIDTH] IN BLOOD BY AUTOMATED COUNT: 16.7 % (ref 11–15)
GLOBULIN PLAS-MCNC: 4.9 G/DL (ref 2.8–4.4)
GLUCOSE BLD-MCNC: 143 MG/DL (ref 70–99)
GLUCOSE BLD-MCNC: 155 MG/DL (ref 70–99)
HCT VFR BLD AUTO: 41.1 % (ref 35–48)
HGB BLD-MCNC: 12.4 G/DL (ref 12–16)
IMM GRANULOCYTES # BLD AUTO: 0.16 X10(3) UL (ref 0–1)
IMM GRANULOCYTES NFR BLD: 1.4 %
INR BLD: 1.01 (ref 0.9–1.1)
LYMPHOCYTES # BLD AUTO: 2.3 X10(3) UL (ref 1–4)
LYMPHOCYTES NFR BLD AUTO: 19.5 %
M PROTEIN MFR SERPL ELPH: 8.4 G/DL (ref 6.4–8.2)
MCH RBC QN AUTO: 24.8 PG (ref 26–34)
MCHC RBC AUTO-ENTMCNC: 30.2 G/DL (ref 31–37)
MCV RBC AUTO: 82 FL (ref 80–100)
MONOCYTES # BLD AUTO: 1.16 X10(3) UL (ref 0.1–1)
MONOCYTES NFR BLD AUTO: 9.8 %
NEUTROPHILS # BLD AUTO: 8 X10 (3) UL (ref 1.5–7.7)
NEUTROPHILS # BLD AUTO: 8 X10(3) UL (ref 1.5–7.7)
NEUTROPHILS NFR BLD AUTO: 67.9 %
OSMOLALITY SERPL CALC.SUM OF ELEC: 291 MOSM/KG (ref 275–295)
PLATELET # BLD AUTO: 334 10(3)UL (ref 150–450)
POTASSIUM SERPL-SCNC: 3.9 MMOL/L (ref 3.5–5.1)
PSA SERPL DL<=0.01 NG/ML-MCNC: 13.7 SECONDS (ref 12.5–14.7)
RBC # BLD AUTO: 5.01 X10(6)UL (ref 3.8–5.3)
SODIUM SERPL-SCNC: 138 MMOL/L (ref 136–145)
T4 FREE SERPL-MCNC: 0.8 NG/DL (ref 0.8–1.7)
TSI SER-ACNC: 13.4 MIU/ML (ref 0.36–3.74)
WBC # BLD AUTO: 11.8 X10(3) UL (ref 4–11)

## 2020-01-15 PROCEDURE — 84439 ASSAY OF FREE THYROXINE: CPT | Performed by: HOSPITALIST

## 2020-01-15 PROCEDURE — A9575 INJ GADOTERATE MEGLUMI 0.1ML: HCPCS | Performed by: HOSPITALIST

## 2020-01-15 PROCEDURE — 82962 GLUCOSE BLOOD TEST: CPT

## 2020-01-15 PROCEDURE — 85610 PROTHROMBIN TIME: CPT | Performed by: HOSPITALIST

## 2020-01-15 PROCEDURE — 99285 EMERGENCY DEPT VISIT HI MDM: CPT

## 2020-01-15 PROCEDURE — 5A09357 ASSISTANCE WITH RESPIRATORY VENTILATION, LESS THAN 24 CONSECUTIVE HOURS, CONTINUOUS POSITIVE AIRWAY PRESSURE: ICD-10-PCS | Performed by: HOSPITALIST

## 2020-01-15 PROCEDURE — 70553 MRI BRAIN STEM W/O & W/DYE: CPT | Performed by: HOSPITALIST

## 2020-01-15 PROCEDURE — 94660 CPAP INITIATION&MGMT: CPT

## 2020-01-15 PROCEDURE — 85025 COMPLETE CBC W/AUTO DIFF WBC: CPT | Performed by: HOSPITALIST

## 2020-01-15 PROCEDURE — 70544 MR ANGIOGRAPHY HEAD W/O DYE: CPT | Performed by: HOSPITALIST

## 2020-01-15 PROCEDURE — 84443 ASSAY THYROID STIM HORMONE: CPT | Performed by: HOSPITALIST

## 2020-01-15 PROCEDURE — 80053 COMPREHEN METABOLIC PANEL: CPT | Performed by: HOSPITALIST

## 2020-01-15 PROCEDURE — 71045 X-RAY EXAM CHEST 1 VIEW: CPT | Performed by: HOSPITALIST

## 2020-01-15 RX ORDER — DEXTROSE MONOHYDRATE 25 G/50ML
50 INJECTION, SOLUTION INTRAVENOUS
Status: DISCONTINUED | OUTPATIENT
Start: 2020-01-15 | End: 2020-01-17

## 2020-01-15 RX ORDER — ONDANSETRON 2 MG/ML
4 INJECTION INTRAMUSCULAR; INTRAVENOUS EVERY 6 HOURS PRN
Status: DISCONTINUED | OUTPATIENT
Start: 2020-01-15 | End: 2020-01-17

## 2020-01-15 RX ORDER — MONTELUKAST SODIUM 10 MG/1
10 TABLET ORAL NIGHTLY
Status: DISCONTINUED | OUTPATIENT
Start: 2020-01-15 | End: 2020-01-17

## 2020-01-15 RX ORDER — PREDNISONE 20 MG/1
60 TABLET ORAL DAILY
Status: DISCONTINUED | OUTPATIENT
Start: 2020-01-15 | End: 2020-01-17

## 2020-01-15 RX ORDER — DULOXETIN HYDROCHLORIDE 60 MG/1
60 CAPSULE, DELAYED RELEASE ORAL DAILY
Status: DISCONTINUED | OUTPATIENT
Start: 2020-01-15 | End: 2020-01-17

## 2020-01-15 RX ORDER — METOCLOPRAMIDE HYDROCHLORIDE 5 MG/ML
10 INJECTION INTRAMUSCULAR; INTRAVENOUS EVERY 8 HOURS PRN
Status: DISCONTINUED | OUTPATIENT
Start: 2020-01-15 | End: 2020-01-17

## 2020-01-15 RX ORDER — HYDROCODONE BITARTRATE AND ACETAMINOPHEN 5; 325 MG/1; MG/1
1 TABLET ORAL EVERY 6 HOURS PRN
Status: DISCONTINUED | OUTPATIENT
Start: 2020-01-15 | End: 2020-01-17

## 2020-01-15 RX ORDER — TRAZODONE HYDROCHLORIDE 100 MG/1
100 TABLET ORAL NIGHTLY PRN
Status: DISCONTINUED | OUTPATIENT
Start: 2020-01-15 | End: 2020-01-15

## 2020-01-15 RX ORDER — HYDROCODONE BITARTRATE AND ACETAMINOPHEN 5; 325 MG/1; MG/1
1 TABLET ORAL EVERY 6 HOURS PRN
Status: DISCONTINUED | OUTPATIENT
Start: 2020-01-15 | End: 2020-01-15

## 2020-01-15 RX ORDER — ASPIRIN 81 MG/1
81 TABLET ORAL DAILY
Status: DISCONTINUED | OUTPATIENT
Start: 2020-01-15 | End: 2020-01-17

## 2020-01-15 RX ORDER — ALBUTEROL SULFATE 90 UG/1
2 AEROSOL, METERED RESPIRATORY (INHALATION) EVERY 4 HOURS PRN
Status: DISCONTINUED | OUTPATIENT
Start: 2020-01-15 | End: 2020-01-17

## 2020-01-15 RX ORDER — COVID-19 ANTIGEN TEST
660 KIT MISCELLANEOUS DAILY PRN
Status: ON HOLD | COMMUNITY
End: 2021-01-13

## 2020-01-15 RX ORDER — AZITHROMYCIN 250 MG/1
250 TABLET, FILM COATED ORAL
Status: DISCONTINUED | OUTPATIENT
Start: 2020-01-15 | End: 2020-01-17

## 2020-01-15 RX ORDER — TRAZODONE HYDROCHLORIDE 100 MG/1
300 TABLET ORAL NIGHTLY PRN
Status: DISCONTINUED | OUTPATIENT
Start: 2020-01-15 | End: 2020-01-17

## 2020-01-15 RX ORDER — PRAVASTATIN SODIUM 20 MG
20 TABLET ORAL NIGHTLY
Status: DISCONTINUED | OUTPATIENT
Start: 2020-01-15 | End: 2020-01-17

## 2020-01-15 RX ORDER — ACETAMINOPHEN 325 MG/1
650 TABLET ORAL EVERY 6 HOURS PRN
Status: DISCONTINUED | OUTPATIENT
Start: 2020-01-15 | End: 2020-01-17

## 2020-01-15 RX ORDER — PANTOPRAZOLE SODIUM 20 MG/1
20 TABLET, DELAYED RELEASE ORAL
Status: DISCONTINUED | OUTPATIENT
Start: 2020-01-15 | End: 2020-01-17

## 2020-01-15 RX ORDER — FLUTICASONE PROPIONATE 50 MCG
2 SPRAY, SUSPENSION (ML) NASAL DAILY
Status: DISCONTINUED | OUTPATIENT
Start: 2020-01-15 | End: 2020-01-17

## 2020-01-15 RX ORDER — METOPROLOL SUCCINATE 25 MG/1
25 TABLET, EXTENDED RELEASE ORAL
Status: DISCONTINUED | OUTPATIENT
Start: 2020-01-15 | End: 2020-01-17

## 2020-01-15 RX ORDER — DILTIAZEM HYDROCHLORIDE 300 MG/1
300 CAPSULE, COATED, EXTENDED RELEASE ORAL DAILY
Status: DISCONTINUED | OUTPATIENT
Start: 2020-01-15 | End: 2020-01-17

## 2020-01-15 NOTE — ED INITIAL ASSESSMENT (HPI)
Pt to ED from ophthalmologist for CT+CTA brain and orbits, wanting pt to be admitted to see neurology, concern for aneurysm.  Pt c/o double vision in left eye since Friday evening, dizziness worsening over 1 week

## 2020-01-15 NOTE — ED NOTES
PT PRESENTS TO ER WITH LEFT EYE PATCH ON AND WILL NOT ALLOW TO BE REMOVED BY RN, Maria Fleischer MD.

## 2020-01-15 NOTE — ED NOTES
PER PT, GIVEN PREDNISONE EYE DROPS, Friday REPORTS PAIN IN LEFT EYE WITH DOUBLE VISION. CALLED DR RIGGS'S OFFICE AND WAS TOLD TO CONTINUE DROPS. PAIN CONTINUED, PT CAME TO ER ON Sunday AND HAD CT SCAN.  F/U WITH  [facility] ON Monday WHO REQUESTED HER TO HAVE MRI,

## 2020-01-15 NOTE — ED PROVIDER NOTES
Patient Seen in: [facility] Emergency Department      History   Patient presents with:  Dizziness  Eye Visual Problem    Stated Complaint: double vision,dizziness    HPI    78-year-old female sent to the emergency room by her ophthalmologist for adm nodules on thyroid RESOLVED WITH SURGERY   • Reflux    • Renal disorder    • Sleep apnea     cpap   • Status post thyroidectomy    • Thyroid ca [facility]) 6/27/2013   • Thyroid cancer ([facility] [address]     localized, s/p resection   • Visual impairment     glasses LENS IMPLANT [id number] Right 4/4/2018    Performed by Belinda Salazar MD at [address] [facility] 5-6-7   • THYROID LOBECTOMY,UNILAT  abt 1982    left hemithyroidectomy; [address] Right 6/11/ nonfocal   Skin: no rashes or nodules    ED Course   Labs Reviewed - No data to display               MDM     30-year-old female sent to the emergency room for admission. Case was discussed with [facility] hospitalist who will contact consultants. Admitted.   Un

## 2020-01-16 ENCOUNTER — APPOINTMENT (OUTPATIENT)
Dept: ULTRASOUND IMAGING | Facility: HOSPITAL | Age: 70
DRG: 123 | End: 2020-01-16
Attending: INTERNAL MEDICINE
Payer: MEDICARE

## 2020-01-16 LAB
BILIRUB UR QL STRIP.AUTO: NEGATIVE
C3 SERPL-MCNC: 117 MG/DL (ref 90–180)
C4 SERPL-MCNC: 21.9 MG/DL (ref 10–40)
CK SERPL-CCNC: 115 U/L (ref 26–192)
CLARITY UR REFRACT.AUTO: CLEAR
COLOR UR AUTO: YELLOW
CRP SERPL-MCNC: <0.29 MG/DL (ref ?–0.3)
GLUCOSE BLD-MCNC: 142 MG/DL (ref 70–99)
GLUCOSE BLD-MCNC: 154 MG/DL (ref 70–99)
GLUCOSE BLD-MCNC: 183 MG/DL (ref 70–99)
GLUCOSE BLD-MCNC: 194 MG/DL (ref 70–99)
GLUCOSE UR STRIP.AUTO-MCNC: NEGATIVE MG/DL
KETONES UR STRIP.AUTO-MCNC: NEGATIVE MG/DL
LEUKOCYTE ESTERASE UR QL STRIP.AUTO: NEGATIVE
NITRITE UR QL STRIP.AUTO: NEGATIVE
PH UR STRIP.AUTO: 7 [PH] (ref 4.5–8)
PROT UR STRIP.AUTO-MCNC: NEGATIVE MG/DL
RBC UR QL AUTO: NEGATIVE
SED RATE-ML: 61 MM/HR (ref 0–25)
SP GR UR STRIP.AUTO: 1.01 (ref 1–1.03)
UROBILINOGEN UR STRIP.AUTO-MCNC: <2 MG/DL

## 2020-01-16 PROCEDURE — 86160 COMPLEMENT ANTIGEN: CPT | Performed by: INTERNAL MEDICINE

## 2020-01-16 PROCEDURE — 82962 GLUCOSE BLOOD TEST: CPT

## 2020-01-16 PROCEDURE — 84238 ASSAY NONENDOCRINE RECEPTOR: CPT | Performed by: OTHER

## 2020-01-16 PROCEDURE — 36415 COLL VENOUS BLD VENIPUNCTURE: CPT

## 2020-01-16 PROCEDURE — 83519 RIA NONANTIBODY: CPT | Performed by: OTHER

## 2020-01-16 PROCEDURE — 83876 ASSAY MYELOPEROXIDASE: CPT | Performed by: INTERNAL MEDICINE

## 2020-01-16 PROCEDURE — 93882 EXTRACRANIAL UNI/LTD STUDY: CPT | Performed by: INTERNAL MEDICINE

## 2020-01-16 PROCEDURE — 86671 FUNGUS NES ANTIBODY: CPT | Performed by: INTERNAL MEDICINE

## 2020-01-16 PROCEDURE — 83516 IMMUNOASSAY NONANTIBODY: CPT | Performed by: INTERNAL MEDICINE

## 2020-01-16 PROCEDURE — 86255 FLUORESCENT ANTIBODY SCREEN: CPT | Performed by: OTHER

## 2020-01-16 PROCEDURE — 85652 RBC SED RATE AUTOMATED: CPT | Performed by: INTERNAL MEDICINE

## 2020-01-16 PROCEDURE — 82550 ASSAY OF CK (CPK): CPT | Performed by: INTERNAL MEDICINE

## 2020-01-16 PROCEDURE — 86255 FLUORESCENT ANTIBODY SCREEN: CPT | Performed by: INTERNAL MEDICINE

## 2020-01-16 PROCEDURE — 86140 C-REACTIVE PROTEIN: CPT | Performed by: INTERNAL MEDICINE

## 2020-01-16 PROCEDURE — 83516 IMMUNOASSAY NONANTIBODY: CPT | Performed by: OTHER

## 2020-01-16 PROCEDURE — 81003 URINALYSIS AUTO W/O SCOPE: CPT | Performed by: HOSPITALIST

## 2020-01-16 RX ORDER — LEVOTHYROXINE SODIUM 0.15 MG/1
150 TABLET ORAL
Status: DISCONTINUED | OUTPATIENT
Start: 2020-01-17 | End: 2020-01-17

## 2020-01-16 RX ORDER — FUROSEMIDE 40 MG/1
80 TABLET ORAL DAILY
Status: DISCONTINUED | OUTPATIENT
Start: 2020-01-17 | End: 2020-01-17

## 2020-01-16 NOTE — PLAN OF CARE
Assumed patient care at 1900  Patient A&O x 4  Complaints of mild pain to left eye- declined medication at this time  Continuing PO steroids at this time  VSS  Tele-SR  On Eliquis for Hx of AFIB  ASHLEY but does not wear CPAP- Pulse ox on  CXR and MRI/MRA obt handout, if applicable  - Encourage broncho-pulmonary hygiene including cough, deep breathe, Incentive Spirometry  - Assess the need for suctioning and perform as needed  - Assess and instruct to report SOB or any respiratory difficulty  - Respiratory Ther

## 2020-01-16 NOTE — CONSULTS
Neurology consult NOTE    The reason for consultation:    Left third nerve palsy and left eye pain. HPI:    Patient is a 71year old female admitted with left third cranial nerve palsy and left eye pain.    She stated prior eye pain, she had 6-8 weeks of Hypercholesterolemia    • Hyperlipidemia    • Hypertension    • Impaired fasting glucose    • [facility] 1/21/2015   • Migraines     none in last 20 years   • Muscle weakness     left side   • Obesity, unspecified    • ASHLEY FOR PAIN MANAGEMENT   • LUMBAR EPIDURAL N/A 4/1/2015    Performed by Tiffani Peterson MD at [address]   • NEEDLE BIOPSY LEFT  ?   • NEEDLE BIOPSY RIGHT  ?   • OTHER  2008    removed benign tumor lt   • REMOVAL OF LUNG,LOBECTOMY  abt 199 file        Non-medical: Not on file    Tobacco Use      Smoking status: Former Smoker        Packs/day: 3.00        Years: 50.00        Pack years: 150        Types: Cigarettes        Start date: 3/7/1961        Quit date: 1/1/1999        Years since quit congestion, sinus pain or sore throat; hearing loss negative  RESPIRATORY: denies shortness of breath, wheezing or cough   CARDIOVASCULAR: denies chest pain or WALKER; no palpitations   GI: denies nausea, vomiting, constipation, diarrhea; no rectal bleeding; 114 (H) 01/08/2014    GLUCOSE 115 (H) 08/30/2013     Lab Results   Component Value Date    K 3.9 01/15/2020    K 4.3 01/12/2020    K 4.50 09/16/2019     Lab Results   Component Value Date    BUN 19 (H) 01/15/2020    BUN 11 01/12/2020    BUN 16.0 09/16/2019 Approved by: aCsh Kumar MD on 1/15/2020 at 20:45          Xr Chest Ap Portable  (cpt=71045)    Result Date: 1/15/2020  CONCLUSION:  Cardiomegaly with mild pulmonary vascular congestion. Increased interstitial markings in the lungs.   Findings are conc me.    Sincerely,      An Layne MD

## 2020-01-16 NOTE — CONSULTS
Consult to Ophthalmology  Consult performed by: Mikael Damian MD  Consult ordered by:  Shad Coles MD        [facility]  Report of Consultation    Vitaliy Gonzalez Patient Status:  Inpatient    3/7/1950 MRN [id number]   Location [place] • ASHLEY (obstructive sleep apnea) SPLIT NIGHT 3-29-16    AHI 51 RDI 53 SaO2 ko 79 % BIPAP 16/11 with O2 entrained @ 2 l/min [employer]   • Osteoarthritis    • Osteoarthritis knees    • Peripheral vascular disease (HCC)     varicose veins   • Pneumonia, orga abt 1999    VATS right; told has a pseudotumor   • REMOVAL OF LUNG,LOBECTOMY  abt 2000    VATS left; possibly sarcoid - bx with vasculitis 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   • REPAIR UMBILICAL TOII,2+O/N,BZLPG  7/9/2007    by Dr Bon Fox in [place]   • [address] [facility] RASH    Medications:    Current Facility-Administered Medications:   •  [START ON 1/17/2020] furosemide (LASIX) tab 80 mg, 80 mg, Oral, Daily  •  [START ON 1/17/2020] Levothyroxine Sodium (SYNTHROID) tab 150 mcg, 150 mcg, Oral, Before breakfast  • Inhalation, Daily  •  traZODone HCl (DESYREL) tab 300 mg, 300 mg, Oral, Nightly PRN  •  predniSONE (DELTASONE) tab 60 mg, 60 mg, Oral, Daily  •  DULoxetine HCl (CYMBALTA) DR particles cap 60 mg, 60 mg, Oral, Daily    Review of Systems:  Pertinent items are

## 2020-01-16 NOTE — PLAN OF CARE
Admission navigator completed by admission RN. Patient settled in bed, and orientated to room. Report given to accepting RN. +gilma BUT does NOT wear her cpap at home.    Did not take any of her morning medications this am.

## 2020-01-16 NOTE — CONSULTS
Rheumatology Consult Note        History of Present Illness: Patient is a 71year old female admitted with left third cranial nerve palsy  -onset 1/9 with sudden left eye pain, denies redness or vision change, or headache  - 1[facility]10 [facility] Hyperlipidemia    • Hypertension    • Impaired fasting glucose    • [facility]in[facility] 1/21/2015   • Migraines     none in last 20 years   • Muscle weakness     left side   • Obesity, unspecified    • ASHLEY (obstructive sleep apnea) LUMBAR EPIDURAL N/A 4/1/2015    Performed by Elyse Mckee MD at [address]   • NEEDLE BIOPSY LEFT  ?   • NEEDLE BIOPSY RIGHT  ?   • OTHER  2008    removed benign tumor lt   • REMOVAL OF LUNG,LOBECTOMY  abt 1999    VATS right; told ha Smokeless tobacco: Never Used    Alcohol use: No    Drug use: No     Family History:  Family History   Problem Relation Age of Onset   • Cancer Maternal Grandmother         stomach   • Cancer Maternal Grandfather         pancreatic   • Other (Other[other]) Neutrophils Absolute      1.50 - 7.70 x10(3) uL 8.00 (H)    Lymphocytes Absolute      1.00 - 4.00 x10(3) uL 2.30    Monocytes Absolute      0.10 - 1.00 x10(3) uL 1.16 (H)    Eosinophils Absolute      0.00 - 0.70 x10(3) uL 0.12    Basophils Absolute typical of temporal arteritis and clinical presentation not classic  - normal CRP argues against active vasculitis, ESR elevation may represent resolving inflammation given CRP normal- ?recent infection with fever  - already on high dose prednisone without

## 2020-01-16 NOTE — PLAN OF CARE
Patient A&O x 4  Complaints of mild pain to left eye- declined medication at this time  Continuing PO steroids   VSS  Tele-SR  On Eliquis for Hx of AFIB  ASHLEY but does not wear CPAP- Pulse ox on  Rheum and Neuro consulted   US temporal artery ordered   Opth Incentive Spirometry  - Assess the need for suctioning and perform as needed  - Assess and instruct to report SOB or any respiratory difficulty  - Respiratory Therapy support as indicated  - Manage/alleviate anxiety  - Monitor for signs/symptoms of CO2 ret

## 2020-01-16 NOTE — RESPIRATORY THERAPY NOTE
ASHLEY - Equipment Use Daily Summary:                  . Set Mode:                . Usage in hours:                . 90% Pressure (EPAP) level:                . 90% Insp. Pressure (IPAP): Slater August AHI:                .  Supplemental Oxygen:    LPM

## 2020-01-16 NOTE — PROGRESS NOTES
NURSING ADMISSION NOTE      Patient admitted via Cart  Oriented to room. Safety precautions initiated. Bed in low position. Call light in reach.     Pt arrived on unit   A&Ox4   VSS on room air   MRI to be done- per pt allergic to Iodine based contra

## 2020-01-16 NOTE — H&P
[facility] Hospitalist History and Physical      CC: L eye pain, change in vision    PCP: Leah Blankenship MD    History of Present Illness: Patient is a 71year old female with PMH sig for for Afib on eliquis, depression/anxiety, thyroid cancer, COPD, ASHLEY sent to E • Asthma    • Back problem    • Bell's palsy     Right side of face still affected   • Cancer (HCC)     thyroid   • Cataract    • Chronic fatigue    • COPD (chronic obstructive pulmonary disease) (HCC)     02-2L nc prn at home   • Depression    • Diabete Rfl: 1  apixaban (ELIQUIS) 5 MG Oral Tab, Take 1 tablet (5 mg total) by mouth 2 (two) times daily.  (Patient taking differently: Take 5 mg by mouth daily.  ), Disp: 180 tablet, Rfl: 3  DULoxetine HCl 30 MG Oral Cap DR Particles, Take 3 capsules (90 mg total Disp: 1 Inhaler, Rfl: 6  Fluticasone Furoate-Vilanterol (BREO ELLIPTA) 200-25 MCG/INH Inhalation Aerosol Powder, Breath Activated, Inhale 1 puff into the lungs daily. , Disp: 3 each, Rfl: 3  alendronate 70 MG Oral Tab, Take 1 tablet (70 mg total) by mouth o ANAPHYLAXIS    Comment:KIDNEY FAILURE  Adhesive Tape               Comment:RED SKIN AND BLISTERS  Compazine               HALLUCINATION  Lipitor [Atorvastat*    PAIN    Comment:LEG PAIN  Hydrocodone             NAUSEA AND VOMITING  Latex Unclear etiology- Per Dr. Binh Topete note felt that lack of response to steroids and exam not consistent with orbital inflammatory syndrome. ESR elevated, CPR wnl. p-ANCA elevated.  Recommend ER visit for CT/CTA to rule out brain aneurysm as well as neuro and

## 2020-01-16 NOTE — PROGRESS NOTES
[facility] Hospitalist Progress Note                                                                   [address]  3/7/1950    CC: FU left eye ptosis    Interval History:  - Feels about the ronit left lid. No scleral injection. + Reported blurry vision. Neck: No masses, trachae midline. No thyromegaly  Pulm: Lungs clear bilaterally, normal respiratory effort  CV: Heart with regular rate and rhythm, no murmur. Normal PMI.     GI: Abdomen soft, nont vascular congestion: No SOB, back on home dose lasix.  Monitor volume status on home meds.     # Afib  - Continue home meds including eliquis   - Tele     # Hypothyroidism: TSH is 13, will increase synthroid dose, recheck TFTs in 2-3 weeks with PCP     # Chavo

## 2020-01-17 VITALS
BODY MASS INDEX: 46 KG/M2 | DIASTOLIC BLOOD PRESSURE: 55 MMHG | RESPIRATION RATE: 20 BRPM | SYSTOLIC BLOOD PRESSURE: 134 MMHG | TEMPERATURE: 98 F | HEART RATE: 50 BPM | WEIGHT: 258.13 LBS | OXYGEN SATURATION: 96 %

## 2020-01-17 LAB
GLUCOSE BLD-MCNC: 103 MG/DL (ref 70–99)
GLUCOSE BLD-MCNC: 114 MG/DL (ref 70–99)

## 2020-01-17 PROCEDURE — 82962 GLUCOSE BLOOD TEST: CPT

## 2020-01-17 PROCEDURE — 97535 SELF CARE MNGMENT TRAINING: CPT

## 2020-01-17 PROCEDURE — 97112 NEUROMUSCULAR REEDUCATION: CPT

## 2020-01-17 PROCEDURE — 97116 GAIT TRAINING THERAPY: CPT

## 2020-01-17 PROCEDURE — 97165 OT EVAL LOW COMPLEX 30 MIN: CPT

## 2020-01-17 PROCEDURE — 97162 PT EVAL MOD COMPLEX 30 MIN: CPT

## 2020-01-17 RX ORDER — PREDNISONE 10 MG/1
TABLET ORAL
Qty: 14 TABLET | Refills: 0 | Status: SHIPPED | OUTPATIENT
Start: 2020-01-17 | End: 2020-01-31 | Stop reason: ALTCHOICE

## 2020-01-17 RX ORDER — ALBUTEROL SULFATE 90 UG/1
2 AEROSOL, METERED RESPIRATORY (INHALATION) EVERY 4 HOURS PRN
Qty: 1 INHALER | Refills: 6 | Status: SHIPPED | COMMUNITY
Start: 2020-01-17

## 2020-01-17 RX ORDER — LEVOTHYROXINE SODIUM 0.15 MG/1
150 TABLET ORAL
Qty: 30 TABLET | Refills: 1 | Status: SHIPPED | OUTPATIENT
Start: 2020-01-17 | End: 2020-04-29

## 2020-01-17 NOTE — PLAN OF CARE
A&Ox4. Room air. Tele d/c per MD. VSS. Left eye covered with eye patch. Complains of left eye pain, PRN norco given with relief. QID accu checks, insulin per sliding scale. Plan for PT/OT eval today. No further needs. Staff will continue to monitor. difficulty  - Respiratory Therapy support as indicated  - Manage/alleviate anxiety  - Monitor for signs/symptoms of CO2 retention  Outcome: Progressing     Problem: NEUROLOGICAL - ADULT  Goal: Achieves stable or improved neurological status  Description  I

## 2020-01-17 NOTE — PHYSICAL THERAPY NOTE
PHYSICAL THERAPY EVALUATION - INPATIENT     Room Number: 6882/0140-L  Evaluation Date: 1/17/2020  Type of Evaluation: Initial  Physician Order: PT Eval and Treat    Presenting Problem: left CN 3 palsy  Reason for Therapy: Mobility Dysfunction and Dis • Status post thyroidectomy    • Thyroid ca [facility]) 6/27/2013   • Thyroid cancer ([facility] [address]     localized, s/p resection   • Visual impairment     glasses       Past Surgical History  Past Surgical History:   Procedure Laterality Date   • APPENDECTOMY     • VIRGEN [address]   • SPINE SURGERY PROCEDURE UNLISTED      C 5-6-7   • THYROID LOBECTOMY,UNILAT  abt 1982    left hemithyroidectomy; in [address]  Right 6/11/2013    Performed by Caitlin Quick MD at [place] [address] Knee extension  4-/5    BALANCE                ADDITIONAL TESTS  Additional Tests: Modified Morris Balance Scale           Modified Morris Balance Scale: 16/28(<23 indicates fall risk)                     NEUROLOGICAL FINDINGS  Neurological Findings: Sensation obstacle clearance and pathway navigation; pt with decreased stance time on left; no overt loss of balance noted  Stairs: not assessed     Comments related to Mobility: Pt denies dizziness throughout session. Left eye patch intact.      Standardized tests: functional mobilities and outpatient PT for balance training. DISCHARGE RECOMMENDATIONS  PT Discharge Recommendations: Outpatient PT(versus HHPT if pt unable to obtain transportation)    PLAN  PT Treatment Plan: Bed mobility; Endurance; Energy conservation

## 2020-01-17 NOTE — DISCHARGE SUMMARY
General Medicine Discharge Summary     Patient ID:  Slava Aden  71year old  3/7/1950    Admit date: 1/15/2020    Discharge date and time:  1/17/20    Attending Physician: Kiah Mazariegos Rheum and Neuro- felt to most likely be 2/2 microvascular ischemia  - Optho and Neuro OK with stopping steroids.  Will need to taper as she has been on HD steroids now for several days  - Superficial temporal artery biopsy unremarkable- not felt to be GCA stop      CONTINUE these medications which have NOT CHANGED    Naproxen Sodium (ALEVE) 220 MG Oral Cap  Take 660 mg by mouth daily as needed.     Zolpidem Tartrate 5 MG Oral Tab  Take 1 tablet (5 mg total) by mouth nightly as needed for Sleep.    Betancourt Caller times daily Diag E11.9    ACCU-CHEK FASTCLIX LANCETS Does not apply Misc  Use to check blood glucose readings 2 times daily Diag E11.9      STOP taking these medications    prednisoLONE acetate 1 % Ophthalmic Suspension    HYDROcodone-acetaminophen (NORCO)

## 2020-01-17 NOTE — RESPIRATORY THERAPY NOTE
ASHLEY - Equipment Use Daily Summary:  · Set Mode  · Usage in hours:   · 90% Pressure (EPAP) level:   · 90% Insp Pressure (IPAP):   · AHI:   · Supplemental Oxygen:   · Comments: PT DID NOT WEAR

## 2020-01-17 NOTE — OCCUPATIONAL THERAPY NOTE
OCCUPATIONAL THERAPY QUICK EVALUATION - INPATIENT    Room Number: 6966/2233-A  Evaluation Date: 1/17/2020     Type of Evaluation: Initial  Presenting Problem: CN3 palsy    Physician Order: IP Consult to Occupational Therapy  Reason for Therapy:  ADL/IADL D Procedure Laterality Date   • APPENDECTOMY     • BENIGN BIOPSY LEFT  ?   • BIOPSY OF BREAST, INCISIONAL      multiple, all benign   • BIOPSY OF BREAST, INCISIONAL  7/9/2007    findings: benign; by Dr Weeks Record in [address] [place] [address] N/A 2/18/201 Collin Olivier MD at [address] OR   • TONSILLECTOMY     • UPPER GI ENDOSCOPY,BIOPSY  8/11/2009    findings: hiatal hernia, mild gastritis; bx results unknown; by Dr Semaj Davis   • [address] Right 10/30/2019    Performed by Ty Velarde MD at E Score:   Score: 18  Approx Degree of Impairment: 46.65%  Standardized Score (AM-PAC Scale): 38.66  CMS Modifier (G-Code): CK    FUNCTIONAL TRANSFER ASSESSMENT  Supine to Sit : Supervision  Sit to Stand: Supervision    Skilled Therapy Provided: Pt was receiv comorbidities nor modifications of tasks    Clinical Decision Making  LOW - Analysis of occupational profile, problem-focused assessments, limited treatment options    Overall Complexity  LOW     OT Discharge Recommendations: Home;Outpatient OT       PLAN

## 2020-01-17 NOTE — CM/SW NOTE
MSW spoke to pt at bedside. She is home with dtr, and would like to use home health. She does not want [facility] due to a bad experience in past.    Referral for home health sent to [facility]-not in network.     DC PLAN:  Referral for home health pending with N

## 2020-01-17 NOTE — PROGRESS NOTES
Neurology follow up NOTE    SUBJECTIVE:  No new complaints today.     OBJECTIVE:   /52 (BP Location: Left arm)   Pulse 51   Temp 97.5 °F (36.4 °C) (Oral)   Resp 20   Wt 258 lb 1.6 oz (117.1 kg)   SpO2 97%   BMI 45.72 kg/m²   Review system: GENERAL HEA STATION:     Gait:deferred   SPEECH:     Articulation: NL     Rhythm: NL     REFLEXES:     RUE: 2+/4     RLE: 2+/4     LUE: 2+/4     LLE: 2+/4     PLANTAR RESPONSE: down going toes bilaterally.     ASSESSMENT:      70 y/o with DM presented with acute left s

## 2020-01-17 NOTE — PLAN OF CARE
Patient to be discharged home this afternoon.      Problem: Diabetes/Glucose Control  Goal: Glucose maintained within prescribed range  Description  INTERVENTIONS:  - Monitor Blood Glucose as ordered  - Assess for signs and symptoms of hyperglycemia and hyp NEUROLOGICAL - ADULT  Goal: Achieves stable or improved neurological status  Description  INTERVENTIONS  - Assess for and report changes in neurological status  - Initiate measures to prevent increased intracranial pressure  - Maintain blood pressure and f

## 2020-01-18 LAB
F-ACTIN (SMOOTH MUSCLE) AB: 14 UNITS
MITOCHONDRIAL M2 AB, IGG: 4 UNITS
MYELOPEROX ANTIBODIES, IGG: 0 AU/ML
SERINE PROTEASE3, IGG: 5 AU/ML

## 2020-01-20 LAB
ACETYLCHOLINE BINDING AB: 0 NMOL/L
ACETYLCHOLINE BLOCKING AB: 1 %
TITIN ANTIBODY: 0.11 IV

## 2020-01-20 NOTE — CM/SW NOTE
01/20/20 0900   Discharge disposition   Expected discharge disposition Home-Health   Name of Facillity/Home Care/Hospice [place]       AVS sent in [place] by ALEJANDRO

## 2020-01-25 LAB
S. CEREVISIAE ANTIBODY, IGA: 15.2 UNITS
S. CEREVISIAE ANTIBODY, IGG: 11 UNITS

## 2020-01-30 PROBLEM — J44.1 COPD EXACERBATION (HCC): Status: RESOLVED | Noted: 2019-06-05 | Resolved: 2020-01-30

## 2020-04-29 PROBLEM — I69.354 HEMIPARESIS AFFECTING LEFT SIDE AS LATE EFFECT OF CEREBROVASCULAR ACCIDENT (CVA) (HCC): Status: ACTIVE | Noted: 2020-04-29

## 2020-06-04 PROBLEM — E87.1 HYPONATREMIA: Status: RESOLVED | Noted: 2019-06-05 | Resolved: 2020-06-04

## 2020-06-04 PROBLEM — D32.9 MENINGIOMA (HCC): Status: ACTIVE | Noted: 2020-06-04

## 2020-06-04 PROBLEM — Z99.81 REQUIRES CONTINUOUS AT HOME SUPPLEMENTAL OXYGEN: Status: ACTIVE | Noted: 2020-06-04

## 2020-06-04 PROBLEM — D68.69 SECONDARY HYPERCOAGULABLE STATE (HCC): Status: ACTIVE | Noted: 2020-06-04

## 2020-06-04 PROBLEM — J18.9 COMMUNITY ACQUIRED PNEUMONIA OF LEFT LOWER LOBE OF LUNG: Status: RESOLVED | Noted: 2019-06-05 | Resolved: 2020-06-04

## 2020-06-19 PROBLEM — Z99.81 CHRONIC RESPIRATORY FAILURE WITH HYPOXIA, ON HOME OXYGEN THERAPY (HCC): Status: ACTIVE | Noted: 2020-06-19

## 2020-06-19 PROBLEM — J96.11 CHRONIC RESPIRATORY FAILURE WITH HYPOXIA, ON HOME OXYGEN THERAPY (HCC): Status: ACTIVE | Noted: 2020-06-19

## 2020-06-19 PROBLEM — J96.11 CHRONIC RESPIRATORY FAILURE WITH HYPOXIA (HCC): Status: RESOLVED | Noted: 2018-02-09 | Resolved: 2020-06-19

## 2020-10-06 PROBLEM — S83.272A COMPLEX TEAR OF LATERAL MENISCUS OF LEFT KNEE AS CURRENT INJURY: Status: RESOLVED | Noted: 2019-11-19 | Resolved: 2020-10-06

## 2020-10-06 PROBLEM — J47.9 BRONCHIECTASIS (HCC): Status: ACTIVE | Noted: 2020-10-06

## 2020-10-06 PROBLEM — D32.9 MENINGIOMA (HCC): Status: ACTIVE | Noted: 2020-10-06

## 2020-10-06 PROBLEM — M65.341 TRIGGER FINGER, RIGHT RING FINGER: Status: RESOLVED | Noted: 2019-11-14 | Resolved: 2020-10-06

## 2020-10-16 PROBLEM — G47.33 OBSTRUCTIVE SLEEP APNEA: Status: ACTIVE | Noted: 2017-01-06

## 2020-10-26 ENCOUNTER — APPOINTMENT (OUTPATIENT)
Dept: GENERAL RADIOLOGY | Facility: HOSPITAL | Age: 70
DRG: 287 | End: 2020-10-26
Attending: EMERGENCY MEDICINE
Payer: MEDICARE

## 2020-10-26 ENCOUNTER — HOSPITAL ENCOUNTER (INPATIENT)
Facility: HOSPITAL | Age: 70
LOS: 2 days | Discharge: HOME | DRG: 287 | End: 2020-10-28
Attending: EMERGENCY MEDICINE | Admitting: INTERNAL MEDICINE
Payer: MEDICARE

## 2020-10-26 DIAGNOSIS — R94.39 ABNORMAL NUCLEAR STRESS TEST: ICD-10-CM

## 2020-10-26 DIAGNOSIS — R07.9 CHEST PAIN WITH HIGH RISK FOR CARDIAC ETIOLOGY: Primary | ICD-10-CM

## 2020-10-26 DIAGNOSIS — J44.1 COPD EXACERBATION (HCC): ICD-10-CM

## 2020-10-26 PROCEDURE — 81001 URINALYSIS AUTO W/SCOPE: CPT | Performed by: EMERGENCY MEDICINE

## 2020-10-26 PROCEDURE — 80053 COMPREHEN METABOLIC PANEL: CPT | Performed by: EMERGENCY MEDICINE

## 2020-10-26 PROCEDURE — 94640 AIRWAY INHALATION TREATMENT: CPT

## 2020-10-26 PROCEDURE — 84484 ASSAY OF TROPONIN QUANT: CPT | Performed by: EMERGENCY MEDICINE

## 2020-10-26 PROCEDURE — 93005 ELECTROCARDIOGRAM TRACING: CPT

## 2020-10-26 PROCEDURE — 85379 FIBRIN DEGRADATION QUANT: CPT | Performed by: EMERGENCY MEDICINE

## 2020-10-26 PROCEDURE — 99285 EMERGENCY DEPT VISIT HI MDM: CPT

## 2020-10-26 PROCEDURE — 36415 COLL VENOUS BLD VENIPUNCTURE: CPT

## 2020-10-26 PROCEDURE — 82962 GLUCOSE BLOOD TEST: CPT

## 2020-10-26 PROCEDURE — 71045 X-RAY EXAM CHEST 1 VIEW: CPT | Performed by: EMERGENCY MEDICINE

## 2020-10-26 PROCEDURE — 85025 COMPLETE CBC W/AUTO DIFF WBC: CPT | Performed by: EMERGENCY MEDICINE

## 2020-10-26 PROCEDURE — 83880 ASSAY OF NATRIURETIC PEPTIDE: CPT | Performed by: EMERGENCY MEDICINE

## 2020-10-26 PROCEDURE — 93010 ELECTROCARDIOGRAM REPORT: CPT

## 2020-10-26 RX ORDER — LEVOTHYROXINE SODIUM 0.15 MG/1
150 TABLET ORAL
Status: DISCONTINUED | OUTPATIENT
Start: 2020-10-27 | End: 2020-10-28

## 2020-10-26 RX ORDER — QUETIAPINE 25 MG/1
100 TABLET, FILM COATED ORAL NIGHTLY
Status: DISCONTINUED | OUTPATIENT
Start: 2020-10-26 | End: 2020-10-28

## 2020-10-26 RX ORDER — ALBUTEROL SULFATE 90 UG/1
8 AEROSOL, METERED RESPIRATORY (INHALATION) ONCE
Status: COMPLETED | OUTPATIENT
Start: 2020-10-26 | End: 2020-10-26

## 2020-10-26 RX ORDER — MONTELUKAST SODIUM 10 MG/1
10 TABLET ORAL EVERY EVENING
Status: DISCONTINUED | OUTPATIENT
Start: 2020-10-26 | End: 2020-10-28

## 2020-10-26 RX ORDER — DIPHENHYDRAMINE HYDROCHLORIDE 50 MG/ML
50 INJECTION INTRAMUSCULAR; INTRAVENOUS ONCE
Status: DISCONTINUED | OUTPATIENT
Start: 2020-10-26 | End: 2020-10-28

## 2020-10-26 RX ORDER — SODIUM CHLORIDE 9 MG/ML
INJECTION, SOLUTION INTRAVENOUS
Status: COMPLETED | OUTPATIENT
Start: 2020-10-27 | End: 2020-10-27

## 2020-10-26 RX ORDER — METOPROLOL SUCCINATE 25 MG/1
25 TABLET, EXTENDED RELEASE ORAL DAILY
Status: DISCONTINUED | OUTPATIENT
Start: 2020-10-26 | End: 2020-10-28

## 2020-10-26 RX ORDER — ROSUVASTATIN CALCIUM 20 MG/1
40 TABLET, COATED ORAL NIGHTLY
Status: DISCONTINUED | OUTPATIENT
Start: 2020-10-26 | End: 2020-10-28

## 2020-10-26 RX ORDER — LEVOTHYROXINE SODIUM 0.15 MG/1
150 TABLET ORAL DAILY
Status: DISCONTINUED | OUTPATIENT
Start: 2020-10-26 | End: 2020-10-26 | Stop reason: SDUPTHER

## 2020-10-26 RX ORDER — DULOXETIN HYDROCHLORIDE 30 MG/1
60 CAPSULE, DELAYED RELEASE ORAL DAILY
Status: DISCONTINUED | OUTPATIENT
Start: 2020-10-27 | End: 2020-10-28

## 2020-10-26 RX ORDER — METOCLOPRAMIDE HYDROCHLORIDE 5 MG/ML
10 INJECTION INTRAMUSCULAR; INTRAVENOUS EVERY 8 HOURS PRN
Status: DISCONTINUED | OUTPATIENT
Start: 2020-10-26 | End: 2020-10-28

## 2020-10-26 RX ORDER — DEXTROSE MONOHYDRATE 25 G/50ML
50 INJECTION, SOLUTION INTRAVENOUS
Status: DISCONTINUED | OUTPATIENT
Start: 2020-10-26 | End: 2020-10-28

## 2020-10-26 RX ORDER — ACETAMINOPHEN 325 MG/1
650 TABLET ORAL EVERY 6 HOURS PRN
Status: DISCONTINUED | OUTPATIENT
Start: 2020-10-26 | End: 2020-10-28

## 2020-10-26 RX ORDER — GABAPENTIN 300 MG/1
300 CAPSULE ORAL NIGHTLY
Status: DISCONTINUED | OUTPATIENT
Start: 2020-10-26 | End: 2020-10-28

## 2020-10-26 RX ORDER — QUETIAPINE 100 MG/1
300 TABLET, FILM COATED ORAL NIGHTLY
Status: DISCONTINUED | OUTPATIENT
Start: 2020-10-26 | End: 2020-10-28

## 2020-10-26 RX ORDER — TRAZODONE HYDROCHLORIDE 100 MG/1
300 TABLET ORAL NIGHTLY
Status: COMPLETED | OUTPATIENT
Start: 2020-10-26 | End: 2020-10-26

## 2020-10-26 RX ORDER — ASPIRIN 81 MG/1
81 TABLET ORAL DAILY
Status: DISCONTINUED | OUTPATIENT
Start: 2020-10-26 | End: 2020-10-28

## 2020-10-26 RX ORDER — AZITHROMYCIN 250 MG/1
250 TABLET, FILM COATED ORAL
Status: DISCONTINUED | OUTPATIENT
Start: 2020-10-28 | End: 2020-10-28

## 2020-10-26 RX ORDER — DULOXETIN HYDROCHLORIDE 30 MG/1
60 CAPSULE, DELAYED RELEASE ORAL DAILY
Status: DISCONTINUED | OUTPATIENT
Start: 2020-10-26 | End: 2020-10-26

## 2020-10-26 RX ORDER — ONDANSETRON 2 MG/ML
4 INJECTION INTRAMUSCULAR; INTRAVENOUS EVERY 6 HOURS PRN
Status: DISCONTINUED | OUTPATIENT
Start: 2020-10-26 | End: 2020-10-28

## 2020-10-26 RX ORDER — METHYLPREDNISOLONE SODIUM SUCCINATE 40 MG/ML
40 INJECTION, POWDER, LYOPHILIZED, FOR SOLUTION INTRAMUSCULAR; INTRAVENOUS EVERY 6 HOURS
Status: COMPLETED | OUTPATIENT
Start: 2020-10-26 | End: 2020-10-27

## 2020-10-26 RX ORDER — ALBUTEROL SULFATE 90 UG/1
2 AEROSOL, METERED RESPIRATORY (INHALATION) EVERY 4 HOURS PRN
Status: DISCONTINUED | OUTPATIENT
Start: 2020-10-26 | End: 2020-10-28

## 2020-10-26 NOTE — ED NOTES
Patient states that on Thursday she felt very short of breath  She called her primary and he instructed her to come here that was 4 days.   Today she had the stress test and felt SOB

## 2020-10-26 NOTE — CONSULTS
[facility] Cardiology Consultation    Vanessa Rodriguez Patient Status:  Emergency    3/7/1950 MRN [id number]   Location [address] [facility] Attending Sonu Hdez MD   Hosp Day # 0 PCP Liam Ferrell MD     Reason for Consultation:  Chest years   • Muscle weakness     left side   • Obesity, unspecified    • ASHLEY (obstructive sleep apnea) SPLIT NIGHT 3-29-16    AHI 51 RDI 53 SaO2 ko 79 % BIPAP 16/11 with O2 entrained @ 2 l/min [employer]   • Osteoarthritis    • Osteoarthritis knees    • Periphe PAIN MANAGEMENT   • NEEDLE BIOPSY LEFT  ?   • NEEDLE BIOPSY RIGHT  ?   • OTHER  2008    removed benign tumor lt   • REMOVAL OF LUNG,LOBECTOMY  abt 1999    VATS right; told has a pseudotumor   • REMOVAL OF LUNG,LOBECTOMY  abt 2000    VATS left; possibly sarai PAIN  Prochlorperazine        OTHER (SEE COMMENTS), NAUSEA AND                            VOMITING    Comment:halucinations             hallucination  Atorvastatin            OTHER (SEE COMMENTS)    Comment:pain  Cephalosporins          OTHER (SEE COMMENTS multiple CV risks. Abnormal clite, though, but morbidly obese. .  2. Morbid obesity  3. COPD  4. PAF - Eliquis  5. HTN  6. Mixed HL  7. DM    Plan:  Admit  Hold eliquis. Radial cath tomorrow.   We talked at length about cardiac catheterization and its risk

## 2020-10-26 NOTE — H&P
General Medicine H&P     Patient presents with:  Chest Pain Angina  Difficulty Breathing       PCP: Ten Graham MD    History of Present Illness: Patient is a 79year old female with PMH including but not limited to afib, thyroid ca, COPD on 2.5L o2, DM veins   • Pneumonia, organism unspecified(486)    • Problems with swallowing     Due to nodules on thyroid RESOLVED WITH SURGERY   • Reflux    • Renal disorder    • Sleep apnea     cpap   • Status post thyroidectomy    • Thyroid ca ([facility] 6/27/2013   • Thyr granulomatis pneuomonitis   • REPAIR UMBILICAL VIRT,8+I/D,CCIWW  7/9/2007    by Dr Felicia Vela in [place]   • [address] 16 Right 4/4/2018    Performed by Luis Belcher MD at [address] [place] [address] Years: 50.00        Pack years: 150        Types: Cigarettes        Start date: 3/7/1961        Quit date: 1999        Years since quittin.8      Smokeless tobacco: Never Used    Alcohol use: No       Fam Hx  Family History   Problem Relation Age ---------------------------------------------------------------------------- Indications:  BMI 45.0-49.9, adult.  Essential hypertension Pure hypercholesterolemia PAF (paroxysmal atrial fibrillation) --------------------------------------------------------- stress. Electrocardiographic Data: The resting EKG demonstrates 64bpm heart rate, with  Left axis deviation. Normal sinus rhythm with right bundle branch block. Normal hemodynamic response to Lexiscan.  Normal oxygen saturations documented during the Radha ---------------------------------------------------------------------------- Summary: - Myocardial perfusion imaging:  There is a moderate-sized, moderately   severe, partially reversible defect involving the basal inferoseptal,   basal, mid, and apical inf at 2:43 PM     Finalized by (CST): Ericka Pena MD on 10/26/2020 at 2:44 PM            ASSESSMENT / PLAN:    79year old female with PMH including but not limited to afib, thyroid ca, COPD on 2.5L o2, DM, HTN, ASHLEY who p/t [facility] ED c ongoing cp.      # CP

## 2020-10-26 NOTE — ED PROVIDER NOTES
Patient Seen in: [facility] Emergency Department      History   Patient presents with:  Chest Pain Angina  Difficulty Breathing    Stated Complaint: cp with sob    HPI    25-year-old female presents to the emergency department with complaints of int 4/22/2015    Resolved per note 6/25/20    • Depression    • Diabetes ([facility]    • Disorder of thyroid    • Esophageal reflux    • Extrinsic asthma, unspecified    • Fibromyalgia    • GERD     • Goiter    • HIGH BLOOD PRESSURE    • HIGH CHOLESTEROL    • Hyper 05/2018    ventral hernia repair, complex   • HYSTERECTOMY      for fibroids; complicated by infection/dehiscence, wound was open for healing   • LARYNGOSCOPY,DIRCT,INJ VOCAL CORD  abt 1986    vocal cord polyps   • LEFT PHACOEMULSIFICATION OF CATARACT WITH Smokeless tobacco: Never Used    Alcohol use: No    Drug use: No             Review of Systems   All other systems reviewed and are negative. Positive for stated complaint: cp with sob  Other systems are as noted in HPI.   Constitutional and vital sign are normal and symmetric.               ED Course     Labs Reviewed   COMP METABOLIC PANEL (14) - Abnormal; Notable for the following components:       Result Value    Glucose 116 (*)     Calcium, Total 10.3 (*)     ALT 11 (*)     Globulin  4.7 (*)     A/G 10/26/2020  ---------------------------------------------------------------------------- Myocardial Perfusion Imaging Lexiscan Patient:            Parvin Jeffries MRN:                [id number] Study Date:         10/26/2020  Location:           [place] stress was 78bpm (52% of maximal predicted heart rate). The maximal predicted heart rate was 150bpm.The heart rate response to stress is normal. There is a normal resting blood pressure with an appropriate response to stress.  The rate-pressure product for basal, mid, and apical inferior, and apical wall(s).  Findings are most consistent wiht non-transmural infarct involving the basal to mid inferior aspect of the left ventricle with small areas of socrates-infarct ischemia in the distal inferior wall and basal i overlying the right upper lobe. Atheromatous calcifications of the aorta. Atelectasis or scarring within left mid lung zone            CONCLUSION:  1. Mild interstitial opacities which may represent chronic interstitial changes versus mild edema.  2. Mini for cardiac etiology R07.9 10/26/2020 Unknown

## 2020-10-27 ENCOUNTER — APPOINTMENT (OUTPATIENT)
Dept: INTERVENTIONAL RADIOLOGY/VASCULAR | Facility: HOSPITAL | Age: 70
DRG: 287 | End: 2020-10-27
Attending: INTERNAL MEDICINE
Payer: MEDICARE

## 2020-10-27 PROCEDURE — 83735 ASSAY OF MAGNESIUM: CPT | Performed by: INTERNAL MEDICINE

## 2020-10-27 PROCEDURE — B2151ZZ FLUOROSCOPY OF LEFT HEART USING LOW OSMOLAR CONTRAST: ICD-10-PCS | Performed by: INTERNAL MEDICINE

## 2020-10-27 PROCEDURE — 85027 COMPLETE CBC AUTOMATED: CPT | Performed by: INTERNAL MEDICINE

## 2020-10-27 PROCEDURE — 82962 GLUCOSE BLOOD TEST: CPT

## 2020-10-27 PROCEDURE — 80048 BASIC METABOLIC PNL TOTAL CA: CPT | Performed by: INTERNAL MEDICINE

## 2020-10-27 PROCEDURE — 4A023N7 MEASUREMENT OF CARDIAC SAMPLING AND PRESSURE, LEFT HEART, PERCUTANEOUS APPROACH: ICD-10-PCS | Performed by: INTERNAL MEDICINE

## 2020-10-27 PROCEDURE — 99152 MOD SED SAME PHYS/QHP 5/>YRS: CPT

## 2020-10-27 PROCEDURE — 93458 L HRT ARTERY/VENTRICLE ANGIO: CPT

## 2020-10-27 PROCEDURE — 94640 AIRWAY INHALATION TREATMENT: CPT

## 2020-10-27 PROCEDURE — 85610 PROTHROMBIN TIME: CPT | Performed by: INTERNAL MEDICINE

## 2020-10-27 PROCEDURE — B2111ZZ FLUOROSCOPY OF MULTIPLE CORONARY ARTERIES USING LOW OSMOLAR CONTRAST: ICD-10-PCS | Performed by: INTERNAL MEDICINE

## 2020-10-27 RX ORDER — LIDOCAINE HYDROCHLORIDE 10 MG/ML
INJECTION, SOLUTION EPIDURAL; INFILTRATION; INTRACAUDAL; PERINEURAL
Status: COMPLETED
Start: 2020-10-27 | End: 2020-10-27

## 2020-10-27 RX ORDER — VERAPAMIL HYDROCHLORIDE 2.5 MG/ML
INJECTION, SOLUTION INTRAVENOUS
Status: COMPLETED
Start: 2020-10-27 | End: 2020-10-27

## 2020-10-27 RX ORDER — TRAZODONE HYDROCHLORIDE 100 MG/1
300 TABLET ORAL NIGHTLY
Status: COMPLETED | OUTPATIENT
Start: 2020-10-27 | End: 2020-10-27

## 2020-10-27 RX ORDER — MIDAZOLAM HYDROCHLORIDE 1 MG/ML
INJECTION INTRAMUSCULAR; INTRAVENOUS
Status: COMPLETED
Start: 2020-10-27 | End: 2020-10-27

## 2020-10-27 RX ORDER — HEPARIN SODIUM 5000 [USP'U]/ML
INJECTION, SOLUTION INTRAVENOUS; SUBCUTANEOUS
Status: DISCONTINUED
Start: 2020-10-27 | End: 2020-10-27 | Stop reason: WASHOUT

## 2020-10-27 RX ORDER — NITROGLYCERIN 20 MG/100ML
INJECTION INTRAVENOUS
Status: COMPLETED
Start: 2020-10-27 | End: 2020-10-27

## 2020-10-27 RX ORDER — HEPARIN SODIUM 5000 [USP'U]/ML
INJECTION, SOLUTION INTRAVENOUS; SUBCUTANEOUS
Status: COMPLETED
Start: 2020-10-27 | End: 2020-10-27

## 2020-10-27 RX ORDER — DIPHENHYDRAMINE HYDROCHLORIDE 50 MG/ML
INJECTION INTRAMUSCULAR; INTRAVENOUS
Status: COMPLETED
Start: 2020-10-27 | End: 2020-10-27

## 2020-10-27 NOTE — PROGRESS NOTES
Cath: 1. LM ok. 2. LAD mild disease. 3. CX co-dominant, large, mild to moderate disease, 40%  4. RCA co-dominant, 40% mid stenosis. 5. LV size and EF normal.    Radial.    Med manage. Restart anticoag. Can home tonight if wrist ok.       VIOLETA Payne

## 2020-10-27 NOTE — PROGRESS NOTES
Pt recovered from angiogram.  No s/s of hematoma on R wrist. Pulse palpable. Sensation normal.  Trom removed and pressure dressing placed. VSS. Saturating 94% on RA. Requires 2 L while sleeping. Pt was notified of discharge clearance.  Pt reported s

## 2020-10-27 NOTE — PLAN OF CARE
Assumed care for patient at [address] [facility]. AOx4. Calm, cooperative. Up with 1 x cane. NSR on cardiac monitor. Adequate saturation on RA during the day. Refuses CPAP at night. On 2L O2 nc throughout the night. Lung sounds diminished.  Denies sob, chest discomfort, n

## 2020-10-27 NOTE — PLAN OF CARE
Drowsy. Forgetful. Disoriented to hospital name. Reorientation provided. Right side droop with mouth, slightly right eye. Pt states this is her baseline from a stoke a long time ago. I did review with hospitalist. Assisted pt up to bathroom with cane.  Pt

## 2020-10-27 NOTE — PROGRESS NOTES
Cards    FU: CP    Nuc with inferior defect. CP free. No events overnight. No current outpatient medications on file.        •  diphenhydrAMINE HCl (BENADRYL) IV PUSH injection 50 mg, 50 mg, Intravenous, Once    •  Albuterol Sulfate  (90 Bas Anxiety    • Arrhythmia     had afib in past one time   • ASHD    • Asthma    • Back problem    • Bell's palsy     Right side of face still affected   • Cancer [facility])     thyroid   • Cataract    • Chronic fatigue    • Community acquired pneumonia of left lo S2  Abdomen: Soft, NT/ND, BS+x4  Extremities: Warm, dry, no LE edema bilat  Pulses: 2+ bilat DP  Skin: no rashes or legions noted  Neurological:  AAOx3, MAEW    Lab Results   Component Value Date    WBC 7.0 10/27/2020    HGB 12.4 10/27/2020    HCT 41.6 10/

## 2020-10-27 NOTE — PROGRESS NOTES
[facility] Hospitalist Progress Note     PCP: Leandro De Souza MD    Chief Complaint: follow-up    Overnight/Interim Events:      SUBJECTIVE:  Laying in bed, feels \"drained\". On 2.5L. napping.      OBJECTIVE:  Temp:  [97.4 °F (36.3 °C)-98.3 °F (36.8 °C)] 97.8 °F Levothyroxine Sodium  150 mcg Oral Before breakfast   • Metoprolol Succinate ER  25 mg Oral Daily   • Montelukast Sodium  10 mg Oral QPM   • QUEtiapine Fumarate  300 mg Oral Nightly   • QUEtiapine Fumarate  100 mg Oral Nightly   • Rosuvastatin Calcium  40

## 2020-10-28 VITALS
HEART RATE: 71 BPM | SYSTOLIC BLOOD PRESSURE: 123 MMHG | RESPIRATION RATE: 17 BRPM | TEMPERATURE: 98 F | HEIGHT: 62 IN | WEIGHT: 271.63 LBS | BODY MASS INDEX: 49.99 KG/M2 | DIASTOLIC BLOOD PRESSURE: 72 MMHG | OXYGEN SATURATION: 100 %

## 2020-10-28 PROCEDURE — 82962 GLUCOSE BLOOD TEST: CPT

## 2020-10-28 NOTE — PAYOR COMM NOTE
--------------  Appropriate for inpatient status per guidelines for chest pain due to infarct.        ADMISSION REVIEW     Andrea Cano MA [facility]  Subscriber #:  [id number]  Authorization Number: [id number]       ED Provider Notes        Patient Seen in: [facility] last addressed  3/19/18   • Complex tear of lateral meniscus of left knee as current injury 11/19/2019   • COPD (chronic obstructive pulmonary disease) ([facility])     02-2L nc prn at home   • DDD (degenerative disc disease), lumbar 4/22/2015    Resolved per not [facility]   • COLONOSCOPY,BIOPSY  8/11/2009    findings: mild non-specific erythema asc colon; bx results unknown; by Dr Sadiq Delacruz in [place] [address]    C5-C7 fusion   • HERNIA SURGERY  05/2018    ventral hernia reviewed and are negative. Positive for stated complaint: cp with sob  Other systems are as noted in HPI. Constitutional and vital signs reviewed. All other systems reviewed and negative except as noted above.     Physical Exam     ED Triage Dona for the following components:       Result Value    Glucose 116 (*)     Calcium, Total 10.3 (*)     ALT 11 (*)     Globulin  4.7 (*)     A/G Ratio 0.7 (*)     All other components within normal limits   URINALYSIS WITH CULTURE REFLEX - Abnormal; Notable fo Yandy Shawn MRN:                [id number] Study Date:         10/26/2020  Location:           [facility] :                1950 Sex:                F Ordering Physician: Haja Lama M.D.,F.A.C.C. ----------------------------------------------------- resting blood pressure with an appropriate response to stress. The rate-pressure product for the peak heart rate and blood pressure was 42829pj Hg/min. The patient experienced no chest pain during stress. Electrocardiographic Data:   The resting EKG demons ventricle with small areas of socrates-infarct ischemia in the distal inferior wall and basal inferolateral wall.  Apical findings are most consistent with apical thinning artifact. ---------------------------------------------------------------------------- Jason Rogers interstitial opacities which may represent chronic interstitial changes versus mild edema.  2. Minimal bilateral basilar atelectasis    Dictated by (CST): Benita Montana MD on 10/26/2020 at 2:43 PM     Finalized by (CST): Benita Montana MD on 10/26/2020 stress today c abnormality; had dyspnea post-exam so went to ED per report. Pt denies fevers. Does report iodine allergy. 135/75. Review of Systems  Comprehensive ROS reviewed and negative except for what's stated above.  \    OBJECTIVE:  /75 continue, currently appears stable   -on seroquel and duloexetine     # On prednsione/azithro, clarify    # Rapid covid neg, low suspicion, ok dc iso                 10/26 CARDS    Impression:   1. Atypical chest pain, dyspnea - multiple CV risks.   Raoul Vega discuss the findings with the family and have already discussed the findings with the patient.               10/27 HOSP    Temp:  [97.4 °F (36.3 °C)-98.3 °F (36.8 °C)] 97.8 °F (36.6 °C)  Pulse:  [] 78  Resp:  [18-22] 18  BP: (112-152)/() 127/65 100 % 220 lb               MEDICATIONS ADMINISTERED IN LAST 1 DAY:  acetaminophen (TYLENOL) tab 650 mg     Date Action Dose Route User    10/27/2020 1514 Given 650 mg Oral Brody Enriquez, RN    10/27/2020 1838 Given 650 mg Oral Brody Enriquez, RN      as MCG/INH inhaler 1 puff     Date Action Dose Route User    10/27/2020 0935 Given 1 puff Inhalation Yin Herbert RCP          REVIEWER COMMENTS:     OTHER:

## 2020-10-28 NOTE — PLAN OF CARE
Assumed care for patient at [address] [facility]. AOx4. Forgetful. Calm, cooperative. Up with 1 x walker. NSR on cardiac monitor. Adequate saturation on RA during the day. Refuses CPAP at night. On 2L O2 nc throughout the night. Lung sounds diminished.  Denies sob, chest

## 2020-10-28 NOTE — PROCEDURES
[address]    PATIENT'S NAME: Lana JERRYNE   ATTENDING PHYSICIAN: Bernardo Brantley. Nova Cartagena M.D. OPERATING PHYSICIAN: Inna Pan.  Shira Kerr MD   PATIENT ACCOUNT#:   [id number]    LOCATION:  8NE-A [facility]  MEDICAL RECORD #:   [id number]       DATE OF B is a codominant vessel, which is moderate to large in caliber with a moderate to large caliber PDA and trivial posterolateral.  There is approximately 40% stenosis in the mid to distal right coronary at the level of the marginal branch.   There is diffuse p

## 2020-10-28 NOTE — DISCHARGE SUMMARY
General Medicine Discharge Summary     Patient ID:  Ema Jackson  79year old  [id number]  3/7/1950    Admit date: 10/26/2020    Discharge date and time:  10/28/20    Attending Physician: Bree Larson, *     Primary Care Physician: Jamison Morales Depression/ Generalized anxiety d/o/psych  -reviewed home meds, continue, currently appears stable   -on seroquel and duloexetine      # Urinary stream weakness-->chronic      # Bell's Palsy c facial droop     # On prednsione/azithro, clarify           Con !01!532/00 (93) !93%! +-----------------------------+--+------------+---+ Procedure Narrative :  Initial setup. The patient was brought to the laboratory. A baseline ECG was recorded. Intravenous access was obtained. Pulse oximetric signals were monitored. perfusion of the basal inferolateral myocardium. LV myocardial perfusion is otherwise normal. Gated SPECT:   The calculated left ventricular ejection fraction is 52%.  LV global systolic function is normal.   There is hypokinesis involving the basal and mid 10/26/2020  PROCEDURE:  XR CHEST AP PORTABLE  (CPT=71045)  TECHNIQUE:  AP chest radiograph was obtained. COMPARISON:  None.   INDICATIONS:  cp with sob  PATIENT STATED HISTORY: (As transcribed by Technologist)  Patient has chest pain and shortness of breat daily.    LEVOTHYROXINE SODIUM 150 MCG Oral Tab  TAKE 1 TABLET(150 MCG) BY MOUTH EVERY DAY    Albuterol Sulfate HFA (VENTOLIN HFA) 108 (90 Base) MCG/ACT Inhalation Aero Soln  Inhale 2 puffs into the lungs every 4 (four) hours as needed for Wheezing.       Kristy Rajan taking these medications    Multiple Vitamins-Minerals (MULTIVITAMINS) Oral Chew Tab    Tiotropium Bromide Monohydrate (SPIRIVA HANDIHALER) 18 MCG Inhalation Cap          Home Medication Changes:      Activity: as directed  Diet: as directed  Wound Care: as

## 2020-10-28 NOTE — PROGRESS NOTES
[facility]  Progress Note    Osman Foreman Patient Status:  Inpatient    3/7/1950 MRN [id number]   [facility] 8NE-A Attending Lalo Salguero, [address] Day # 2 PCP Dm Gordon MD     Subjective:   No ches 3 Encounters:  10/28/20 : 271 lb 9.7 oz (123.2 kg)  10/16/20 : 273 lb 11.2 oz (124.1 kg)  09/28/20 : 270 lb (122.5 kg)      Allergies:     Ace Inhibitors          ANGIOEDEMA, SWELLING  Codeine                 HIVES  Iodine (Topical)        HIVES, OTHER (SEE cath site  Neurologic:  Alert and oriented. Normal affect. Integument:  No visible rashes are appreciated.       Laboratory/Data:   Recent Labs   Lab 10/26/20  1322 10/27/20  0530   * 210*   BUN 14 16   CREATSERUM 0.90 1.01   GFRAA 75 65   GFRNAA 6

## 2020-10-28 NOTE — PLAN OF CARE
More alert this morning. Up in chair. Forgetful at times. Right side droop with mouth, slightly right eye. Pt states this is her baseline from a stoke a long time ago. Reviewed with hospitalist yesterday. Baseline neuro check intact.   Up in room with wa

## 2020-10-29 NOTE — PAYOR COMM NOTE
--------------  DISCHARGE REVIEW    Kitty Wilson MA [facility]  Subscriber #:  [id number]  Authorization Number: [id number]    Admit date: 10/26/20  Admit time:  1856  Discharge Date: 10/28/2020  5:30 PM     Admitting Physician: Idalmis Meza MD  Attending P cath- nonobstructive dz  -pt reports iodine allergy, pre-medicate protocol   -asa  -tele     # afib, HTN  -resume eliquis  -BB, CCB     # COPD  -no acute exac  -home o2 2.5L  -cont home inhalers albuterol, spiriva, auto-sub     # Type 2 diabetes mellitus +-----------------------------+--+------------+---+ ! Regadenoson (Lexiscan); 2 NJX!49!556/99 (101)! 93%! +-----------------------------+--+------------+---+ ! Regadenoson (Lexiscan); 3 min!75!------------!93%!  +-----------------------------+--+------------+- isotope injection using 180 degree gated SPECT technique with wall motion and ejection fraction analysis. Rest Images:  LV regional perfusion:  Moderately reduced perfusion of the basal-mid inferior and apical myocardium.  LV myocardial perfusion is otherwi ventricle. Impressions: - Abnormal nuclear perfusion study. - There is evidence of myocardial ischemia. - There is evidence of myocardial infarction. - Abnormal regional wall thickening is noted on gated SPECT analysis. - Normal ejection fraction.  LV regio Breath Activated  Inhale 1 puff into the lungs 2 (two) times daily. Tiotropium Bromide Monohydrate (SPIRIVA RESPIMAT) 2.5 MCG/ACT Inhalation Aero Soln  Inhale 2 puffs into the lungs daily.     Montelukast Sodium 10 MG Oral Tab  Take 10 mg by mouth every (ACCU-CHEK SMARTVIEW) In Vitro Strip  Use to check blood glucose readings 2 times daily Diag E11.9    ACCU-CHEK FASTCLIX LANCETS Does not apply Misc  Use to check blood glucose readings 2 times daily Diag E11.9    Fluticasone Propionate 50 MCG/ACT Nasal Linda

## 2020-12-22 PROBLEM — I25.10 NON-OCCLUSIVE CORONARY ARTERY DISEASE: Status: ACTIVE | Noted: 2020-12-22

## 2020-12-22 PROBLEM — R07.9 CHEST PAIN WITH HIGH RISK FOR CARDIAC ETIOLOGY: Status: RESOLVED | Noted: 2020-10-26 | Resolved: 2020-12-22

## 2020-12-22 PROBLEM — R94.39 ABNORMAL NUCLEAR STRESS TEST: Status: RESOLVED | Noted: 2020-10-26 | Resolved: 2020-12-22

## 2020-12-23 PROBLEM — E66.01 MORBID OBESITY WITH BMI OF 50.0-59.9, ADULT (HCC): Status: ACTIVE | Noted: 2019-06-07

## 2021-01-12 ENCOUNTER — HOSPITAL ENCOUNTER (OUTPATIENT)
Facility: HOSPITAL | Age: 71
Setting detail: OBSERVATION
Discharge: HOME | End: 2021-01-14
Attending: EMERGENCY MEDICINE | Admitting: INTERNAL MEDICINE
Payer: MEDICARE

## 2021-01-12 ENCOUNTER — APPOINTMENT (OUTPATIENT)
Dept: CT IMAGING | Facility: HOSPITAL | Age: 71
End: 2021-01-12
Attending: EMERGENCY MEDICINE
Payer: MEDICARE

## 2021-01-12 DIAGNOSIS — K42.9 PERIUMBILICAL HERNIA: ICD-10-CM

## 2021-01-12 DIAGNOSIS — R10.31 ABDOMINAL PAIN, RIGHT LOWER QUADRANT: Primary | ICD-10-CM

## 2021-01-12 DIAGNOSIS — N28.89 RENAL MASS: ICD-10-CM

## 2021-01-12 LAB
ALBUMIN SERPL-MCNC: 3.3 G/DL (ref 3.4–5)
ALBUMIN/GLOB SERPL: 0.6 {RATIO} (ref 1–2)
ALP LIVER SERPL-CCNC: 104 U/L
ALT SERPL-CCNC: 20 U/L
ANION GAP SERPL CALC-SCNC: 3 MMOL/L (ref 0–18)
AST SERPL-CCNC: 15 U/L (ref 15–37)
BASOPHILS # BLD AUTO: 0.03 X10(3) UL (ref 0–0.2)
BASOPHILS NFR BLD AUTO: 0.3 %
BILIRUB SERPL-MCNC: 0.6 MG/DL (ref 0.1–2)
BILIRUB UR QL STRIP.AUTO: NEGATIVE
BUN BLD-MCNC: 19 MG/DL (ref 7–18)
BUN/CREAT SERPL: 17.3 (ref 10–20)
CALCIUM BLD-MCNC: 10.7 MG/DL (ref 8.5–10.1)
CHLORIDE SERPL-SCNC: 103 MMOL/L (ref 98–112)
CO2 SERPL-SCNC: 29 MMOL/L (ref 21–32)
COLOR UR AUTO: YELLOW
CREAT BLD-MCNC: 1.1 MG/DL
DEPRECATED RDW RBC AUTO: 49.6 FL (ref 35.1–46.3)
EOSINOPHIL # BLD AUTO: 0.11 X10(3) UL (ref 0–0.7)
EOSINOPHIL NFR BLD AUTO: 1.3 %
ERYTHROCYTE [DISTWIDTH] IN BLOOD BY AUTOMATED COUNT: 17.5 % (ref 11–15)
GLOBULIN PLAS-MCNC: 5.1 G/DL (ref 2.8–4.4)
GLUCOSE BLD-MCNC: 143 MG/DL (ref 70–99)
GLUCOSE UR STRIP.AUTO-MCNC: NEGATIVE MG/DL
HCT VFR BLD AUTO: 40.4 %
HGB BLD-MCNC: 12.4 G/DL
IMM GRANULOCYTES # BLD AUTO: 0.08 X10(3) UL (ref 0–1)
IMM GRANULOCYTES NFR BLD: 0.9 %
KETONES UR STRIP.AUTO-MCNC: NEGATIVE MG/DL
LIPASE SERPL-CCNC: 152 U/L (ref 73–393)
LYMPHOCYTES # BLD AUTO: 1.25 X10(3) UL (ref 1–4)
LYMPHOCYTES NFR BLD AUTO: 14.5 %
M PROTEIN MFR SERPL ELPH: 8.4 G/DL (ref 6.4–8.2)
MCH RBC QN AUTO: 24.4 PG (ref 26–34)
MCHC RBC AUTO-ENTMCNC: 30.7 G/DL (ref 31–37)
MCV RBC AUTO: 79.4 FL
MONOCYTES # BLD AUTO: 0.88 X10(3) UL (ref 0.1–1)
MONOCYTES NFR BLD AUTO: 10.2 %
NEUTROPHILS # BLD AUTO: 6.27 X10 (3) UL (ref 1.5–7.7)
NEUTROPHILS # BLD AUTO: 6.27 X10(3) UL (ref 1.5–7.7)
NEUTROPHILS NFR BLD AUTO: 72.8 %
NITRITE UR QL STRIP.AUTO: NEGATIVE
OSMOLALITY SERPL CALC.SUM OF ELEC: 285 MOSM/KG (ref 275–295)
PH UR STRIP.AUTO: 6 [PH] (ref 4.5–8)
PLATELET # BLD AUTO: 243 10(3)UL (ref 150–450)
POTASSIUM SERPL-SCNC: 3.8 MMOL/L (ref 3.5–5.1)
PROT UR STRIP.AUTO-MCNC: 100 MG/DL
RBC # BLD AUTO: 5.09 X10(6)UL
RBC UR QL AUTO: NEGATIVE
SARS-COV-2 RNA RESP QL NAA+PROBE: NOT DETECTED
SODIUM SERPL-SCNC: 135 MMOL/L (ref 136–145)
SP GR UR STRIP.AUTO: 1.02 (ref 1–1.03)
UROBILINOGEN UR STRIP.AUTO-MCNC: <2 MG/DL
WBC # BLD AUTO: 8.6 X10(3) UL (ref 4–11)

## 2021-01-12 PROCEDURE — 74176 CT ABD & PELVIS W/O CONTRAST: CPT | Performed by: EMERGENCY MEDICINE

## 2021-01-12 PROCEDURE — 85025 COMPLETE CBC W/AUTO DIFF WBC: CPT | Performed by: EMERGENCY MEDICINE

## 2021-01-12 PROCEDURE — 96375 TX/PRO/DX INJ NEW DRUG ADDON: CPT

## 2021-01-12 PROCEDURE — 99285 EMERGENCY DEPT VISIT HI MDM: CPT

## 2021-01-12 PROCEDURE — 96374 THER/PROPH/DIAG INJ IV PUSH: CPT

## 2021-01-12 PROCEDURE — 87086 URINE CULTURE/COLONY COUNT: CPT | Performed by: EMERGENCY MEDICINE

## 2021-01-12 PROCEDURE — 81001 URINALYSIS AUTO W/SCOPE: CPT | Performed by: EMERGENCY MEDICINE

## 2021-01-12 PROCEDURE — 96376 TX/PRO/DX INJ SAME DRUG ADON: CPT

## 2021-01-12 PROCEDURE — 80053 COMPREHEN METABOLIC PANEL: CPT | Performed by: EMERGENCY MEDICINE

## 2021-01-12 PROCEDURE — 83690 ASSAY OF LIPASE: CPT | Performed by: EMERGENCY MEDICINE

## 2021-01-12 RX ORDER — ONDANSETRON 2 MG/ML
4 INJECTION INTRAMUSCULAR; INTRAVENOUS EVERY 4 HOURS PRN
Status: CANCELLED | OUTPATIENT
Start: 2021-01-12

## 2021-01-12 RX ORDER — HYDROMORPHONE HYDROCHLORIDE 1 MG/ML
0.5 INJECTION, SOLUTION INTRAMUSCULAR; INTRAVENOUS; SUBCUTANEOUS EVERY 30 MIN PRN
Status: CANCELLED | OUTPATIENT
Start: 2021-01-12 | End: 2021-01-12

## 2021-01-12 RX ORDER — ONDANSETRON 2 MG/ML
4 INJECTION INTRAMUSCULAR; INTRAVENOUS ONCE
Status: COMPLETED | OUTPATIENT
Start: 2021-01-12 | End: 2021-01-12

## 2021-01-12 RX ORDER — HYDROMORPHONE HYDROCHLORIDE 1 MG/ML
0.5 INJECTION, SOLUTION INTRAMUSCULAR; INTRAVENOUS; SUBCUTANEOUS ONCE
Status: COMPLETED | OUTPATIENT
Start: 2021-01-12 | End: 2021-01-12

## 2021-01-12 RX ORDER — SODIUM CHLORIDE 9 MG/ML
INJECTION, SOLUTION INTRAVENOUS CONTINUOUS
Status: CANCELLED | OUTPATIENT
Start: 2021-01-12 | End: 2021-01-12

## 2021-01-12 RX ORDER — HYDROMORPHONE HYDROCHLORIDE 1 MG/ML
0.5 INJECTION, SOLUTION INTRAMUSCULAR; INTRAVENOUS; SUBCUTANEOUS EVERY 30 MIN PRN
Status: DISCONTINUED | OUTPATIENT
Start: 2021-01-12 | End: 2021-01-13

## 2021-01-13 ENCOUNTER — APPOINTMENT (OUTPATIENT)
Dept: ULTRASOUND IMAGING | Facility: HOSPITAL | Age: 71
End: 2021-01-13
Attending: PHYSICIAN ASSISTANT
Payer: MEDICARE

## 2021-01-13 PROBLEM — K42.9 PERIUMBILICAL HERNIA: Status: ACTIVE | Noted: 2021-01-13

## 2021-01-13 PROBLEM — N28.89 RENAL MASS: Status: ACTIVE | Noted: 2021-01-13

## 2021-01-13 LAB
ANION GAP SERPL CALC-SCNC: 2 MMOL/L (ref 0–18)
BUN BLD-MCNC: 18 MG/DL (ref 7–18)
BUN/CREAT SERPL: 18.9 (ref 10–20)
CALCIUM BLD-MCNC: 10.8 MG/DL (ref 8.5–10.1)
CHLORIDE SERPL-SCNC: 102 MMOL/L (ref 98–112)
CO2 SERPL-SCNC: 34 MMOL/L (ref 21–32)
CREAT BLD-MCNC: 0.95 MG/DL
GLUCOSE BLD-MCNC: 106 MG/DL (ref 70–99)
GLUCOSE BLD-MCNC: 117 MG/DL (ref 70–99)
GLUCOSE BLD-MCNC: 117 MG/DL (ref 70–99)
GLUCOSE BLD-MCNC: 125 MG/DL (ref 70–99)
GLUCOSE BLD-MCNC: 146 MG/DL (ref 70–99)
GLUCOSE BLD-MCNC: 200 MG/DL (ref 70–99)
OSMOLALITY SERPL CALC.SUM OF ELEC: 289 MOSM/KG (ref 275–295)
POTASSIUM SERPL-SCNC: 3.8 MMOL/L (ref 3.5–5.1)
SODIUM SERPL-SCNC: 138 MMOL/L (ref 136–145)

## 2021-01-13 PROCEDURE — 76775 US EXAM ABDO BACK WALL LIM: CPT | Performed by: PHYSICIAN ASSISTANT

## 2021-01-13 PROCEDURE — 82962 GLUCOSE BLOOD TEST: CPT

## 2021-01-13 PROCEDURE — 80048 BASIC METABOLIC PNL TOTAL CA: CPT | Performed by: HOSPITALIST

## 2021-01-13 PROCEDURE — 96372 THER/PROPH/DIAG INJ SC/IM: CPT

## 2021-01-13 RX ORDER — DULOXETIN HYDROCHLORIDE 30 MG/1
60 CAPSULE, DELAYED RELEASE ORAL DAILY
Status: DISCONTINUED | OUTPATIENT
Start: 2021-01-13 | End: 2021-01-14

## 2021-01-13 RX ORDER — ONDANSETRON 2 MG/ML
4 INJECTION INTRAMUSCULAR; INTRAVENOUS EVERY 6 HOURS PRN
Status: DISCONTINUED | OUTPATIENT
Start: 2021-01-13 | End: 2021-01-14

## 2021-01-13 RX ORDER — TRAZODONE HYDROCHLORIDE 100 MG/1
300 TABLET ORAL NIGHTLY
Status: DISCONTINUED | OUTPATIENT
Start: 2021-01-13 | End: 2021-01-13

## 2021-01-13 RX ORDER — AZITHROMYCIN 250 MG/1
250 TABLET, FILM COATED ORAL
Status: DISCONTINUED | OUTPATIENT
Start: 2021-01-13 | End: 2021-01-14

## 2021-01-13 RX ORDER — HYDROMORPHONE HYDROCHLORIDE 1 MG/ML
0.2 INJECTION, SOLUTION INTRAMUSCULAR; INTRAVENOUS; SUBCUTANEOUS EVERY 4 HOURS PRN
Status: DISCONTINUED | OUTPATIENT
Start: 2021-01-13 | End: 2021-01-13

## 2021-01-13 RX ORDER — GABAPENTIN 100 MG/1
100 CAPSULE ORAL EVERY MORNING
Status: DISCONTINUED | OUTPATIENT
Start: 2021-01-13 | End: 2021-01-14

## 2021-01-13 RX ORDER — HEPARIN SODIUM 5000 [USP'U]/ML
5000 INJECTION, SOLUTION INTRAVENOUS; SUBCUTANEOUS EVERY 8 HOURS SCHEDULED
Status: DISCONTINUED | OUTPATIENT
Start: 2021-01-13 | End: 2021-01-14

## 2021-01-13 RX ORDER — GABAPENTIN 300 MG/1
300 CAPSULE ORAL NIGHTLY
Status: DISCONTINUED | OUTPATIENT
Start: 2021-01-13 | End: 2021-01-14

## 2021-01-13 RX ORDER — HYDROCODONE BITARTRATE AND ACETAMINOPHEN 5; 325 MG/1; MG/1
1 TABLET ORAL EVERY 4 HOURS PRN
Status: DISCONTINUED | OUTPATIENT
Start: 2021-01-13 | End: 2021-01-14

## 2021-01-13 RX ORDER — PANTOPRAZOLE SODIUM 40 MG/1
40 TABLET, DELAYED RELEASE ORAL
Refills: 2 | Status: DISCONTINUED | OUTPATIENT
Start: 2021-01-13 | End: 2021-01-14

## 2021-01-13 RX ORDER — ROSUVASTATIN CALCIUM 20 MG/1
40 TABLET, COATED ORAL NIGHTLY
Status: DISCONTINUED | OUTPATIENT
Start: 2021-01-13 | End: 2021-01-14

## 2021-01-13 RX ORDER — ACETAMINOPHEN 500 MG
500 TABLET ORAL EVERY 6 HOURS PRN
COMMUNITY

## 2021-01-13 RX ORDER — TRAMADOL HYDROCHLORIDE 50 MG/1
50 TABLET ORAL EVERY 6 HOURS PRN
Status: DISCONTINUED | OUTPATIENT
Start: 2021-01-13 | End: 2021-01-14

## 2021-01-13 RX ORDER — MONTELUKAST SODIUM 10 MG/1
10 TABLET ORAL EVERY EVENING
Status: DISCONTINUED | OUTPATIENT
Start: 2021-01-13 | End: 2021-01-13

## 2021-01-13 RX ORDER — FLUTICASONE PROPIONATE 50 MCG
2 SPRAY, SUSPENSION (ML) NASAL DAILY
Status: DISCONTINUED | OUTPATIENT
Start: 2021-01-13 | End: 2021-01-14

## 2021-01-13 RX ORDER — HYDROCODONE BITARTRATE AND ACETAMINOPHEN 5; 325 MG/1; MG/1
2 TABLET ORAL EVERY 4 HOURS PRN
Status: DISCONTINUED | OUTPATIENT
Start: 2021-01-13 | End: 2021-01-14

## 2021-01-13 RX ORDER — METOPROLOL SUCCINATE 25 MG/1
25 TABLET, EXTENDED RELEASE ORAL
Status: DISCONTINUED | OUTPATIENT
Start: 2021-01-13 | End: 2021-01-14

## 2021-01-13 RX ORDER — TRAZODONE HYDROCHLORIDE 100 MG/1
400 TABLET ORAL NIGHTLY
Status: DISCONTINUED | OUTPATIENT
Start: 2021-01-13 | End: 2021-01-14

## 2021-01-13 RX ORDER — HYDROMORPHONE HYDROCHLORIDE 1 MG/ML
0.5 INJECTION, SOLUTION INTRAMUSCULAR; INTRAVENOUS; SUBCUTANEOUS EVERY 4 HOURS PRN
Status: DISCONTINUED | OUTPATIENT
Start: 2021-01-13 | End: 2021-01-13

## 2021-01-13 RX ORDER — ALBUTEROL SULFATE 90 UG/1
2 AEROSOL, METERED RESPIRATORY (INHALATION) EVERY 4 HOURS PRN
Status: DISCONTINUED | OUTPATIENT
Start: 2021-01-13 | End: 2021-01-14

## 2021-01-13 RX ORDER — GABAPENTIN 100 MG/1
100 CAPSULE ORAL EVERY MORNING
COMMUNITY
Start: 2020-12-02 | End: 2021-03-29

## 2021-01-13 RX ORDER — LEVOTHYROXINE SODIUM 0.15 MG/1
150 TABLET ORAL
Status: DISCONTINUED | OUTPATIENT
Start: 2021-01-13 | End: 2021-01-14

## 2021-01-13 RX ORDER — QUETIAPINE 100 MG/1
300 TABLET, FILM COATED ORAL NIGHTLY
Status: DISCONTINUED | OUTPATIENT
Start: 2021-01-13 | End: 2021-01-13

## 2021-01-13 RX ORDER — DULOXETIN HYDROCHLORIDE 30 MG/1
60 CAPSULE, DELAYED RELEASE ORAL EVERY MORNING
COMMUNITY

## 2021-01-13 RX ORDER — QUETIAPINE 300 MG/1
450 TABLET, FILM COATED ORAL NIGHTLY
COMMUNITY
End: 2021-01-21

## 2021-01-13 RX ORDER — HYDROMORPHONE HYDROCHLORIDE 1 MG/ML
0.1 INJECTION, SOLUTION INTRAMUSCULAR; INTRAVENOUS; SUBCUTANEOUS
Status: DISCONTINUED | OUTPATIENT
Start: 2021-01-13 | End: 2021-01-13

## 2021-01-13 RX ORDER — QUETIAPINE 100 MG/1
100 TABLET, FILM COATED ORAL NIGHTLY
Status: DISCONTINUED | OUTPATIENT
Start: 2021-01-13 | End: 2021-01-13

## 2021-01-13 RX ORDER — TIZANIDINE 4 MG/1
4 TABLET ORAL 3 TIMES DAILY
Status: DISCONTINUED | OUTPATIENT
Start: 2021-01-13 | End: 2021-01-13

## 2021-01-13 RX ORDER — METOCLOPRAMIDE HYDROCHLORIDE 5 MG/ML
5 INJECTION INTRAMUSCULAR; INTRAVENOUS EVERY 8 HOURS PRN
Status: DISCONTINUED | OUTPATIENT
Start: 2021-01-13 | End: 2021-01-14

## 2021-01-13 RX ORDER — POTASSIUM CHLORIDE 20 MEQ/1
40 TABLET, EXTENDED RELEASE ORAL ONCE
Status: DISCONTINUED | OUTPATIENT
Start: 2021-01-13 | End: 2021-01-13

## 2021-01-13 RX ORDER — ACETAMINOPHEN 325 MG/1
650 TABLET ORAL EVERY 4 HOURS PRN
Status: DISCONTINUED | OUTPATIENT
Start: 2021-01-13 | End: 2021-01-14

## 2021-01-13 RX ORDER — POTASSIUM CHLORIDE 20 MEQ/1
40 TABLET, EXTENDED RELEASE ORAL ONCE
Status: COMPLETED | OUTPATIENT
Start: 2021-01-13 | End: 2021-01-13

## 2021-01-13 NOTE — ED PROVIDER NOTES
Patient Seen in: [facility] 3nw-a      History   Patient presents with:  Abdomen/Flank Pain    Stated Complaint: RLQ pain, sent over for R/O appy     HPI/Subjective:   HPI    72-year-old female complaint of right lower quad abdominal pain.   The esha ([facility] 1/21/2015   • Migraines     none in last 20 years   • Muscle weakness     left side   • Obesity, unspecified    • ASHLEY (obstructive sleep apnea) SPLIT NIGHT 3-29-16    AHI 51 RDI 53 SaO2 ko 79 % BIPAP 16/11 with O2 entrained @ 2 l/min Marnie Mejia   • Ost 4/1/2015    Performed by Mary Coles MD at [address]   • NEEDLE BIOPSY LEFT  ?   • NEEDLE BIOPSY RIGHT  ?   • OTHER  2008    removed benign tumor lt   • REMOVAL OF LUNG,LOBECTOMY  abt 1999    VATS right; told has a pseudotumor   • 120/76   Pulse 70   Temp 97.5 °F (36.4 °C) (Temporal)   Resp 16   Wt 125 kg   SpO2 98%   BMI 50.40 kg/m²         Physical Exam    Patient is alert and orient x3 her BMI is 50 HEENT exam within normal limits neck shows no lymphadenopathy or JVD lungs are cl Abnormality         Status                     ---------                               -----------         ------                     CBC W/ DIFFERENTIAL[[id number]]          Abnormal            Final result                 Please view results for thes is no obstructing stone there is no obvious etiology intra-abdominal a for her pain.   Her symptoms may be related to ilioinguinal neuropathy or possibly abdominal pain etiology is unclear the patient will needed pain medication what had difficulty ambulati

## 2021-01-13 NOTE — CONSULTS
[facility]  Report of Consultation    Junior Castillo Patient Status:  Observation    3/7/1950 MRN [id number]   [facility] 3NW-A Attending Mary Monk MD   [facility] Day # 0 PCP Elina Loving MD     21     Reason for Consultati Impaired fasting glucose    • Lumbar stenosis 4/22/2015    Resolved per note 6/25/20    • Macroalbuminuric diabetic nephropathy ([facility] 1/21/2015   • Migraines     none in last 20 years   • Muscle weakness     left side   • Obesity, unspecified    • ASHLEY (obs [address]   • LUMBAR EPIDURAL N/A 4/22/2015    Performed by German Torres MD at [facility] N/A 4/1/2015    Performed by German Torres MD at [facility]   • NEEDLE BIOPSY LEFT  ?    • Inhibitors          ANGIOEDEMA, SWELLING  Codeine                 HIVES  Iodine (Topical)        HIVES, OTHER (SEE COMMENTS), RASH    Comment:KIDNEY FAILURE             Kidney failure  Meloxicam               HIVES  Morphine                HIVES, RASH    C Oral, Q MWF  •  dilTIAZem (DILACOR XR) 24 hr cap 300 mg, 300 mg, Oral, Daily  •  DULoxetine HCl (CYMBALTA) DR particles cap 60 mg, 60 mg, Oral, Daily  •  Fluticasone Propionate (FLONASE) 50 MCG/ACT nasal spray 2 spray, 2 spray, Nasal, Daily  •  gabapentin 108 (90 Base) MCG/ACT Inhalation Aero Soln, Inhale 2 puffs into the lungs every 4 (four) hours as needed for Wheezing.  , Disp: 1 Inhaler, Rfl: 6    •  omeprazole 20 MG Oral Capsule Delayed Release, Take 1 capsule (20 mg total) by mouth 2 (two) times daily pressure 144/71, pulse 79, temperature 97.7 °F (36.5 °C), temperature source Oral, resp. rate 20, weight 275 lb 9.2 oz (125 kg), SpO2 93 %, not currently breastfeeding.     GENERAL: Alert and oriented x 3, well developed, well obese female, in no apparent d dimensions. There is a smaller, hyperdense nodular focus along the midpole the left kidney measuring 0.9 cm in greatest dimension, this has the appearance of a small hyperdense/hemorrhagic cyst. ADRENALS:  No mass or enlargement.   LIVER:  No enlargement, or obstruction identified. 2. The appendix is not identified. However there are no secondary findings that would suggest an acute appendicitis.  3. There is a solid-appearing round mass along the inferior pole of the right kidney measuring 3 cm in maximal use of insulin (HCC)     PAF (paroxysmal atrial fibrillation) ([email or web address]     Morbid obesity with BMI of 50.0-59.9, adult ([facility]     Hyperlipidemia associated with type 2 diabetes mellitus ([facility]     Primary osteoarthritis of left knee     Long term current use of

## 2021-01-13 NOTE — CONSULTS
[facility] Urology   Consultation Note    Aura Ramirez Patient Status:  Observation    3/7/1950 MRN [id number]   [facility] 3NW-A Attending Mike Gilmore MD   Hosp Day # 0 PCP Andie Verdugo MD     Reason for Consultation: disease) ([facility]     02-2L nc prn at home   • DDD (degenerative disc disease), lumbar 4/22/2015    Resolved per note 6/25/20    • Depression    • Diabetes [facility])    • Disorder of thyroid    • Esophageal reflux    • Extrinsic asthma, unspecified    • Fibromyal SURG,ANTER,CERVICAL,SINGLE LVL  abt 1986    C5-C7 fusion   • HERNIA SURGERY  05/2018    ventral hernia repair, complex   • HYSTERECTOMY      for fibroids; complicated by infection/dehiscence, wound was open for healing   • LARYNGOSCOPY,DIRCT,INJ VOCAL CORD • Hypertension Mother    • Lipids Mother    • Obesity Mother    • Psychiatric Mother       reports that she quit smoking about 22 years ago. Her smoking use included cigarettes. She started smoking about 59 years ago.  She has a 150.00 pack-year smoking h 0.1 mg, Intravenous, Q3H PRN **OR** [DISCONTINUED] HYDROmorphone HCl (DILAUDID) 1 MG/ML injection 0.2 mg, 0.2 mg, Intravenous, Q4H PRN **OR** HYDROmorphone HCl (DILAUDID) 1 MG/ML injection 0.2 mg, 0.2 mg, Intravenous, Q4H PRN  •  Albuterol Sulfate  Component Value Date    WBC 8.6 01/12/2021    HGB 12.4 01/12/2021    HCT 40.4 01/12/2021    .0 01/12/2021    CREATSERUM 0.95 01/13/2021    BUN 18 01/13/2021     01/13/2021    K 3.8 01/13/2021     01/13/2021    CO2 34.0 01/13/2021    GL periumbilical hernia contains a small segment of the transverse colon along the superior aspect of the hernia sac and measures 7.6 x 13.0 x 5.1 cm in maximal dimensions. BONES:  No acute osseous injuries are noted.   There is grade 2 anterolisthesis at L5- nephropathy ([facility]     Lumbar radiculitis     Tortuous aorta (HCC)     Type 2 diabetes mellitus with diabetic nephropathy (HCC)     Essential hypertension     Anxiety     Obstructive sleep apnea     Obesity hypoventilation syndrome ([facility]     Hyperparathyroi results. Above discussed with Dr. Isauro Ramirez. Dr. Carol Lima reviewed CT scan films. Thank you for allowing me to participate in the care of your patient.     Mic Patterson P.A.-C  [facility]  1/13/2021  12:45 PM

## 2021-01-13 NOTE — ED INITIAL ASSESSMENT (HPI)
Pt ab pain since October, pt pmhx x8 surgeries of hernia, pt ao4, pain 10/10 rlq and rt flank pain, pt needs assistance w/ transferring to bed, pt uses cane to walk

## 2021-01-13 NOTE — H&P
[facility]    History and Physical     Saima Alejandrina Patient Status:  Observation    3/7/1950 MRN [id number]   [facility] 3NW-A Attending Omkar Hernandez MD   Hosp Day # 0 PCP Linette Mejia MD     Chief Complaint: Abdominal Tracee Tyron 6/5/2019    Resolved per lab review   • Impaired fasting glucose    • Lumbar stenosis 4/22/2015    Resolved per note 6/25/20    • Macroalbuminuric diabetic nephropathy ([facility] [address] 1/21/2015   • Migraines     none in last 20 years   • Muscle weakness     left jennifer Performed by Josesito Freire MD at [address] [facility],[address] N/A 4/22/2015    Performed by Maria Ines Luna MD at [address] [place] [address] 4/1/2015    Performed by Maria Ines Luna MD at [address] [place] [address] Hypertension Mother    • Lipids Mother    • Obesity Mother    • Psychiatric Mother    Mother passed  Father passed  2 brothers living  0 sister    Allergies:    Ace Inhibitors          ANGIOEDEMA, SWELLING  Codeine                 HIVES  Iodine (Topical) Inhaler, Rfl: 6    •  DULoxetine HCl 30 MG Oral Cap DR Particles, Take 3 capsules (90 mg total) by mouth daily. (Patient taking differently: Take 60 mg by mouth daily.  ), Disp: 90 capsule, Rfl: 3    •  aspirin 81 MG Oral Tab EC, Take 81 mg by mouth daily. nightly., Disp: , Rfl:     •  QUEtiapine Fumarate 300 MG Oral Tab, Take 1 tablet (300 mg total) by mouth nightly., Disp: 30 tablet, Rfl: 0    •  tiZANidine HCl 4 MG Oral Tab, Take 1 tablet (4 mg total) by mouth 3 (three) times daily. , Disp: 30 tablet, Rfl: Appropriate mood and affect.       Diagnostic Data:      Labs:  Recent Labs   Lab 01/12/21 1832   WBC 8.6   HGB 12.4   MCV 79.4*   .0       Recent Labs   Lab 01/12/21  1832 01/12/21  1947 01/13/21  0715   *  --  117*   BUN 19*  --  18   CREAT GERD  -pantoprazole    Quality:  · DVT Prophylaxis: scd  · CODE status: DNR but ok with pressors and antiarrhythmics  · POA is her daughter sung  · Nicole: none    Plan of care discussed with patient and staff    Dispo: no discharge    Lonell Louder,

## 2021-01-14 VITALS
TEMPERATURE: 98 F | RESPIRATION RATE: 16 BRPM | WEIGHT: 275.56 LBS | BODY MASS INDEX: 50 KG/M2 | SYSTOLIC BLOOD PRESSURE: 125 MMHG | HEART RATE: 65 BPM | DIASTOLIC BLOOD PRESSURE: 58 MMHG | OXYGEN SATURATION: 99 %

## 2021-01-14 LAB
ANION GAP SERPL CALC-SCNC: 1 MMOL/L (ref 0–18)
BASOPHILS # BLD AUTO: 0.04 X10(3) UL (ref 0–0.2)
BASOPHILS NFR BLD AUTO: 0.6 %
BUN BLD-MCNC: 12 MG/DL (ref 7–18)
BUN/CREAT SERPL: 13.6 (ref 10–20)
CALCIUM BLD-MCNC: 10.6 MG/DL (ref 8.5–10.1)
CHLORIDE SERPL-SCNC: 103 MMOL/L (ref 98–112)
CO2 SERPL-SCNC: 34 MMOL/L (ref 21–32)
CREAT BLD-MCNC: 0.88 MG/DL
DEPRECATED RDW RBC AUTO: 52.6 FL (ref 35.1–46.3)
EOSINOPHIL # BLD AUTO: 0.17 X10(3) UL (ref 0–0.7)
EOSINOPHIL NFR BLD AUTO: 2.4 %
ERYTHROCYTE [DISTWIDTH] IN BLOOD BY AUTOMATED COUNT: 17.3 % (ref 11–15)
GLUCOSE BLD-MCNC: 105 MG/DL (ref 70–99)
GLUCOSE BLD-MCNC: 111 MG/DL (ref 70–99)
GLUCOSE BLD-MCNC: 179 MG/DL (ref 70–99)
HAV IGM SER QL: 2.5 MG/DL (ref 1.6–2.6)
HCT VFR BLD AUTO: 37.2 %
HGB BLD-MCNC: 10.7 G/DL
IMM GRANULOCYTES # BLD AUTO: 0.06 X10(3) UL (ref 0–1)
IMM GRANULOCYTES NFR BLD: 0.8 %
LYMPHOCYTES # BLD AUTO: 1.29 X10(3) UL (ref 1–4)
LYMPHOCYTES NFR BLD AUTO: 17.9 %
MCH RBC QN AUTO: 24.1 PG (ref 26–34)
MCHC RBC AUTO-ENTMCNC: 28.8 G/DL (ref 31–37)
MCV RBC AUTO: 83.8 FL
MONOCYTES # BLD AUTO: 0.99 X10(3) UL (ref 0.1–1)
MONOCYTES NFR BLD AUTO: 13.8 %
NEUTROPHILS # BLD AUTO: 4.64 X10 (3) UL (ref 1.5–7.7)
NEUTROPHILS # BLD AUTO: 4.64 X10(3) UL (ref 1.5–7.7)
NEUTROPHILS NFR BLD AUTO: 64.5 %
OSMOLALITY SERPL CALC.SUM OF ELEC: 286 MOSM/KG (ref 275–295)
PLATELET # BLD AUTO: 217 10(3)UL (ref 150–450)
POTASSIUM SERPL-SCNC: 3.9 MMOL/L (ref 3.5–5.1)
RBC # BLD AUTO: 4.44 X10(6)UL
SODIUM SERPL-SCNC: 138 MMOL/L (ref 136–145)
WBC # BLD AUTO: 7.2 X10(3) UL (ref 4–11)

## 2021-01-14 PROCEDURE — 85025 COMPLETE CBC W/AUTO DIFF WBC: CPT | Performed by: HOSPITALIST

## 2021-01-14 PROCEDURE — 83735 ASSAY OF MAGNESIUM: CPT | Performed by: HOSPITALIST

## 2021-01-14 PROCEDURE — 80048 BASIC METABOLIC PNL TOTAL CA: CPT | Performed by: HOSPITALIST

## 2021-01-14 PROCEDURE — 96372 THER/PROPH/DIAG INJ SC/IM: CPT

## 2021-01-14 PROCEDURE — 82962 GLUCOSE BLOOD TEST: CPT

## 2021-01-14 RX ORDER — HYDROCODONE BITARTRATE AND ACETAMINOPHEN 5; 325 MG/1; MG/1
1 TABLET ORAL EVERY 4 HOURS PRN
Qty: 15 TABLET | Refills: 0 | Status: ON HOLD | OUTPATIENT
Start: 2021-01-14 | End: 2021-04-16

## 2021-01-14 NOTE — PLAN OF CARE
Pt is AOx4. On 3L O2, maintaining O2 sats >90%. No tele ordered. VSS. Up with standby assist to the chair. Pt with pain to RLQ, managed with PRN meds. Pt states she gets a headache with Norco, headache relieved with PRN Tylenol. PRN Tramadol ordered.  QID a opioid side effects  - Notify MD/LIP if interventions unsuccessful or patient reports new pain  - Anticipate increased pain with activity and pre-medicate as appropriate  Outcome: Progressing

## 2021-01-14 NOTE — RESPIRATORY THERAPY NOTE
Patient has ASHLEY and is non compliant with cpap at home. Will not wear cpap here. Will monitor patient with pulse ox at night.

## 2021-01-14 NOTE — PROGRESS NOTES
[facility]  Urology Progress Note    Hector Alfred Patient Status:  Observation    3/7/1950 MRN [id number]   [facility] 3NW-A Attending Timothy Bajwa MD   Hosp Day # 0 PCP Darren Rodriguez MD     Subjective:  Hector Alfred is

## 2021-01-14 NOTE — PLAN OF CARE
Assumed care of pt at [address] [place] [address]. Pt c/o pain 10/10 to right lower quadrant pt described pain as \"pounding/throbbing\". Pt medicated with ultram with little relief and later given norco with relief.  Tolerated full liquid diet advanced to soft for breakfast. Pt

## 2021-01-14 NOTE — PROGRESS NOTES
[facility]  Progress Note    Austin Weir Patient Status:  Observation    3/7/1950 MRN [id number]   [facility] 3NW-A Attending Maricel De La Cruz MD   Hosp Day # 0 PCP Kenan Cheung MD     Subjective:    Patient continues to note R with chronic diastolic congestive heart failure and stage 3 chronic kidney disease (HCC)     Type 2 diabetes mellitus with stage 3 chronic kidney disease, without long-term current use of insulin (HCC)     PAF [facility]oxysmal [facility] ([facility]     Mor

## 2021-01-14 NOTE — PROGRESS NOTES
NURSING DISCHARGE NOTE    Discharged Home via Wheelchair. Accompanied by Support staff  Belongings Taken by patient/family. Patient discharged home in stable condition. PIV removed. Prescription for Norco sent with patient.  All discharge instructio

## 2021-01-18 NOTE — DISCHARGE SUMMARY
[address] [facility] Patient Status:  Observation    3/7/1950 MRN [id number]   [facility] 3NW-A Attending No att. providers found   Hosp Day # 0 PCP Andie Verdugo MD     Date of Admission: 2021 by mouth once a week. Per pt takes every Monday. Quantity: 12 tablet  Refills: 1     apixaban 5 MG Tabs  Commonly known as: Eliquis      Take 1 tablet (5 mg total) by mouth 2 (two) times daily.    Quantity: 180 tablet  Refills: 3     aspirin 81 MG Tbec by mouth 2 (two) times daily before meals. Quantity: 180 capsule  Refills: 2     QUEtiapine Fumarate 300 MG Tabs  Commonly known as: SEROQUEL      Take 450 mg by mouth nightly.    Refills: 0     Rosuvastatin Calcium 40 MG Tabs  Commonly known as: CRESTOR No murmurs, rubs or gallops. Abdomen: Soft, mild tenderness mid abdomen, nondistended. Positive bowel sounds. No rebound or guarding. Neurologic: No focal neurological deficits. Musculoskeletal: Moves all extremities. Extremities: No edema.   ------- MD     Time spent:  > 30 minutes

## 2021-01-19 ENCOUNTER — PATIENT OUTREACH (OUTPATIENT)
Dept: CASE MANAGEMENT | Age: 71
End: 2021-01-19

## 2021-01-19 NOTE — PROGRESS NOTES
Patient called requesting assistance scheduling apts     [address]  [address] [facility]  [phone]  Apt made for Wed. Jan. 20th @1:10pm    [address]  [address] [facility]   [phone]

## 2021-02-03 ENCOUNTER — PATIENT OUTREACH (OUTPATIENT)
Dept: CASE MANAGEMENT | Age: 71
End: 2021-02-03

## 2021-02-03 NOTE — PROGRESS NOTES
Patient called requesting assistance scheduling apt    [address]ully Neely [address] [facility], [address]  [phone]  Apt made for [facility]. Feb. 18th @1:30pm    [address] [facility]  [address]

## 2021-02-22 ENCOUNTER — PATIENT OUTREACH (OUTPATIENT)
Dept: CASE MANAGEMENT | Age: 71
End: 2021-02-22

## 2021-02-22 NOTE — PROGRESS NOTES
Patient called requesting assistance with scheduling           Shaka Ramirez MD  [address] [phone]  Appt scheduled for Fri March 5th @4:00pm       Lisa Yin MD  [address] [place] [address] Dr [address]

## 2021-02-25 ENCOUNTER — PATIENT OUTREACH (OUTPATIENT)
Dept: CASE MANAGEMENT | Age: 71
End: 2021-02-25

## 2021-02-25 NOTE — PROGRESS NOTES
Patient left a voice mail requesting assistance           Morenita Jarquin [address] [place] [address],[address]  [phone]  Appt Rescheduled for March 9th @2:30

## 2021-03-07 PROBLEM — F41.1 GENERALIZED ANXIETY DISORDER: Status: ACTIVE | Noted: 2021-03-07

## 2021-03-07 PROBLEM — E78.2 MIXED HYPERLIPIDEMIA: Status: ACTIVE | Noted: 2021-03-07

## 2021-03-07 PROBLEM — E11.69 HYPERLIPIDEMIA ASSOCIATED WITH TYPE 2 DIABETES MELLITUS (HCC): Status: RESOLVED | Noted: 2019-06-11 | Resolved: 2021-03-07

## 2021-03-07 PROBLEM — E78.5 HYPERLIPIDEMIA ASSOCIATED WITH TYPE 2 DIABETES MELLITUS (HCC): Status: RESOLVED | Noted: 2019-06-11 | Resolved: 2021-03-07

## 2021-03-12 ENCOUNTER — LAB ENCOUNTER (OUTPATIENT)
Dept: LAB | Facility: HOSPITAL | Age: 71
End: 2021-03-12
Attending: SURGERY
Payer: MEDICARE

## 2021-03-12 ENCOUNTER — APPOINTMENT (OUTPATIENT)
Dept: LAB | Facility: HOSPITAL | Age: 71
End: 2021-03-12
Attending: SURGERY
Payer: MEDICARE

## 2021-03-12 DIAGNOSIS — N28.89 RIGHT RENAL MASS: ICD-10-CM

## 2021-03-12 DIAGNOSIS — E11.21 TYPE 2 DIABETES MELLITUS WITH DIABETIC NEPHROPATHY, WITHOUT LONG-TERM CURRENT USE OF INSULIN (HCC): ICD-10-CM

## 2021-03-12 LAB
EST. AVERAGE GLUCOSE BLD GHB EST-MCNC: 180 MG/DL (ref 68–126)
HBA1C MFR BLD HPLC: 7.9 % (ref ?–5.7)

## 2021-03-12 PROCEDURE — 87086 URINE CULTURE/COLONY COUNT: CPT

## 2021-03-12 PROCEDURE — 36415 COLL VENOUS BLD VENIPUNCTURE: CPT

## 2021-03-12 PROCEDURE — 83036 HEMOGLOBIN GLYCOSYLATED A1C: CPT

## 2021-03-19 RX ORDER — CLINDAMYCIN PHOSPHATE 900 MG/50ML
900 INJECTION INTRAVENOUS ONCE
Status: CANCELLED | OUTPATIENT
Start: 2021-03-19 | End: 2021-03-19

## 2021-04-07 ENCOUNTER — ANESTHESIA EVENT (OUTPATIENT)
Dept: SURGERY | Facility: HOSPITAL | Age: 71
DRG: 336 | End: 2021-04-07
Payer: MEDICARE

## 2021-04-09 ENCOUNTER — LAB ENCOUNTER (OUTPATIENT)
Dept: LAB | Facility: HOSPITAL | Age: 71
DRG: 336 | End: 2021-04-09
Attending: SURGERY
Payer: MEDICARE

## 2021-04-09 DIAGNOSIS — K43.9 VENTRAL HERNIA WITHOUT OBSTRUCTION OR GANGRENE: ICD-10-CM

## 2021-04-12 ENCOUNTER — ANESTHESIA (OUTPATIENT)
Dept: SURGERY | Facility: HOSPITAL | Age: 71
DRG: 336 | End: 2021-04-12
Payer: MEDICARE

## 2021-04-12 ENCOUNTER — HOSPITAL ENCOUNTER (INPATIENT)
Facility: HOSPITAL | Age: 71
LOS: 4 days | Discharge: NURSING, REHAB OR LONG-TERM CARE | DRG: 336 | End: 2021-04-16
Attending: SURGERY | Admitting: SURGERY
Payer: MEDICARE

## 2021-04-12 DIAGNOSIS — K43.9 VENTRAL HERNIA WITHOUT OBSTRUCTION OR GANGRENE: Primary | ICD-10-CM

## 2021-04-12 PROCEDURE — S0077 INJECTION, CLINDAMYCIN PHOSP: HCPCS | Performed by: SURGERY

## 2021-04-12 PROCEDURE — 82962 GLUCOSE BLOOD TEST: CPT

## 2021-04-12 PROCEDURE — 0WPG0JZ REMOVAL OF SYNTHETIC SUBSTITUTE FROM PERITONEAL CAVITY, OPEN APPROACH: ICD-10-PCS | Performed by: SURGERY

## 2021-04-12 PROCEDURE — 0DNU0ZZ RELEASE OMENTUM, OPEN APPROACH: ICD-10-PCS | Performed by: SURGERY

## 2021-04-12 PROCEDURE — 76942 ECHO GUIDE FOR BIOPSY: CPT | Performed by: ANESTHESIOLOGY

## 2021-04-12 PROCEDURE — 88302 TISSUE EXAM BY PATHOLOGIST: CPT | Performed by: SURGERY

## 2021-04-12 PROCEDURE — 0WUF0JZ SUPPLEMENT ABDOMINAL WALL WITH SYNTHETIC SUBSTITUTE, OPEN APPROACH: ICD-10-PCS | Performed by: SURGERY

## 2021-04-12 DEVICE — BARD SOFT MESH
Type: IMPLANTABLE DEVICE | Site: ABDOMEN | Status: FUNCTIONAL
Brand: BARD SOFT MESH

## 2021-04-12 RX ORDER — ONDANSETRON 2 MG/ML
INJECTION INTRAMUSCULAR; INTRAVENOUS AS NEEDED
Status: DISCONTINUED | OUTPATIENT
Start: 2021-04-12 | End: 2021-04-12 | Stop reason: SURG

## 2021-04-12 RX ORDER — CLINDAMYCIN PHOSPHATE 900 MG/50ML
900 INJECTION INTRAVENOUS ONCE
Status: COMPLETED | OUTPATIENT
Start: 2021-04-12 | End: 2021-04-12

## 2021-04-12 RX ORDER — NALOXONE HYDROCHLORIDE 0.4 MG/ML
80 INJECTION, SOLUTION INTRAMUSCULAR; INTRAVENOUS; SUBCUTANEOUS AS NEEDED
Status: DISCONTINUED | OUTPATIENT
Start: 2021-04-12 | End: 2021-04-12 | Stop reason: HOSPADM

## 2021-04-12 RX ORDER — SODIUM CHLORIDE, SODIUM LACTATE, POTASSIUM CHLORIDE, CALCIUM CHLORIDE 600; 310; 30; 20 MG/100ML; MG/100ML; MG/100ML; MG/100ML
INJECTION, SOLUTION INTRAVENOUS CONTINUOUS
Status: DISCONTINUED | OUTPATIENT
Start: 2021-04-12 | End: 2021-04-12 | Stop reason: HOSPADM

## 2021-04-12 RX ORDER — CLINDAMYCIN PHOSPHATE 600 MG/50ML
600 INJECTION INTRAVENOUS EVERY 8 HOURS
Status: COMPLETED | OUTPATIENT
Start: 2021-04-12 | End: 2021-04-13

## 2021-04-12 RX ORDER — MEPERIDINE HYDROCHLORIDE 25 MG/ML
12.5 INJECTION INTRAMUSCULAR; INTRAVENOUS; SUBCUTANEOUS AS NEEDED
Status: DISCONTINUED | OUTPATIENT
Start: 2021-04-12 | End: 2021-04-12 | Stop reason: HOSPADM

## 2021-04-12 RX ORDER — MIRTAZAPINE 15 MG/1
15 TABLET, FILM COATED ORAL NIGHTLY
Status: DISCONTINUED | OUTPATIENT
Start: 2021-04-12 | End: 2021-04-16

## 2021-04-12 RX ORDER — LEVOTHYROXINE SODIUM 0.15 MG/1
150 TABLET ORAL
Status: DISCONTINUED | OUTPATIENT
Start: 2021-04-13 | End: 2021-04-16

## 2021-04-12 RX ORDER — HYDROMORPHONE HYDROCHLORIDE 1 MG/ML
0.4 INJECTION, SOLUTION INTRAMUSCULAR; INTRAVENOUS; SUBCUTANEOUS EVERY 5 MIN PRN
Status: DISCONTINUED | OUTPATIENT
Start: 2021-04-12 | End: 2021-04-12 | Stop reason: HOSPADM

## 2021-04-12 RX ORDER — ONDANSETRON 2 MG/ML
4 INJECTION INTRAMUSCULAR; INTRAVENOUS EVERY 6 HOURS PRN
Status: DISCONTINUED | OUTPATIENT
Start: 2021-04-12 | End: 2021-04-16

## 2021-04-12 RX ORDER — DEXAMETHASONE SODIUM PHOSPHATE 4 MG/ML
VIAL (ML) INJECTION AS NEEDED
Status: DISCONTINUED | OUTPATIENT
Start: 2021-04-12 | End: 2021-04-12 | Stop reason: SURG

## 2021-04-12 RX ORDER — BUPIVACAINE HYDROCHLORIDE AND EPINEPHRINE 2.5; 5 MG/ML; UG/ML
INJECTION, SOLUTION EPIDURAL; INFILTRATION; INTRACAUDAL; PERINEURAL AS NEEDED
Status: DISCONTINUED | OUTPATIENT
Start: 2021-04-12 | End: 2021-04-12 | Stop reason: SURG

## 2021-04-12 RX ORDER — NALOXONE HYDROCHLORIDE 0.4 MG/ML
0.08 INJECTION, SOLUTION INTRAMUSCULAR; INTRAVENOUS; SUBCUTANEOUS
Status: DISCONTINUED | OUTPATIENT
Start: 2021-04-12 | End: 2021-04-15

## 2021-04-12 RX ORDER — HEPARIN SODIUM 5000 [USP'U]/ML
5000 INJECTION, SOLUTION INTRAVENOUS; SUBCUTANEOUS EVERY 12 HOURS SCHEDULED
Status: DISCONTINUED | OUTPATIENT
Start: 2021-04-12 | End: 2021-04-16

## 2021-04-12 RX ORDER — LIDOCAINE HYDROCHLORIDE 10 MG/ML
INJECTION, SOLUTION EPIDURAL; INFILTRATION; INTRACAUDAL; PERINEURAL AS NEEDED
Status: DISCONTINUED | OUTPATIENT
Start: 2021-04-12 | End: 2021-04-12 | Stop reason: SURG

## 2021-04-12 RX ORDER — HEPARIN SODIUM 5000 [USP'U]/ML
5000 INJECTION, SOLUTION INTRAVENOUS; SUBCUTANEOUS ONCE
Status: COMPLETED | OUTPATIENT
Start: 2021-04-12 | End: 2021-04-12

## 2021-04-12 RX ORDER — ROCURONIUM BROMIDE 10 MG/ML
INJECTION, SOLUTION INTRAVENOUS AS NEEDED
Status: DISCONTINUED | OUTPATIENT
Start: 2021-04-12 | End: 2021-04-12 | Stop reason: SURG

## 2021-04-12 RX ORDER — ONDANSETRON 2 MG/ML
4 INJECTION INTRAMUSCULAR; INTRAVENOUS AS NEEDED
Status: DISCONTINUED | OUTPATIENT
Start: 2021-04-12 | End: 2021-04-12 | Stop reason: HOSPADM

## 2021-04-12 RX ORDER — METOPROLOL SUCCINATE 25 MG/1
25 TABLET, EXTENDED RELEASE ORAL
Status: DISCONTINUED | OUTPATIENT
Start: 2021-04-13 | End: 2021-04-16

## 2021-04-12 RX ORDER — GABAPENTIN 300 MG/1
300 CAPSULE ORAL NIGHTLY
Status: DISCONTINUED | OUTPATIENT
Start: 2021-04-12 | End: 2021-04-16

## 2021-04-12 RX ORDER — ALBUTEROL SULFATE 2.5 MG/3ML
2.5 SOLUTION RESPIRATORY (INHALATION) AS NEEDED
Status: DISCONTINUED | OUTPATIENT
Start: 2021-04-12 | End: 2021-04-12 | Stop reason: HOSPADM

## 2021-04-12 RX ORDER — DEXTROSE MONOHYDRATE 25 G/50ML
50 INJECTION, SOLUTION INTRAVENOUS
Status: DISCONTINUED | OUTPATIENT
Start: 2021-04-12 | End: 2021-04-12 | Stop reason: HOSPADM

## 2021-04-12 RX ORDER — DEXAMETHASONE SODIUM PHOSPHATE 10 MG/ML
INJECTION, SOLUTION INTRAMUSCULAR; INTRAVENOUS AS NEEDED
Status: DISCONTINUED | OUTPATIENT
Start: 2021-04-12 | End: 2021-04-12 | Stop reason: SURG

## 2021-04-12 RX ORDER — DIPHENHYDRAMINE HYDROCHLORIDE 50 MG/ML
12.5 INJECTION INTRAMUSCULAR; INTRAVENOUS AS NEEDED
Status: DISCONTINUED | OUTPATIENT
Start: 2021-04-12 | End: 2021-04-12 | Stop reason: HOSPADM

## 2021-04-12 RX ORDER — HYDROMORPHONE HYDROCHLORIDE 1 MG/ML
INJECTION, SOLUTION INTRAMUSCULAR; INTRAVENOUS; SUBCUTANEOUS
Status: COMPLETED
Start: 2021-04-12 | End: 2021-04-12

## 2021-04-12 RX ORDER — DIPHENHYDRAMINE HYDROCHLORIDE 50 MG/ML
12.5 INJECTION INTRAMUSCULAR; INTRAVENOUS EVERY 4 HOURS PRN
Status: DISCONTINUED | OUTPATIENT
Start: 2021-04-12 | End: 2021-04-15

## 2021-04-12 RX ORDER — METOCLOPRAMIDE HYDROCHLORIDE 5 MG/ML
10 INJECTION INTRAMUSCULAR; INTRAVENOUS EVERY 6 HOURS PRN
Status: DISCONTINUED | OUTPATIENT
Start: 2021-04-12 | End: 2021-04-12

## 2021-04-12 RX ORDER — ONDANSETRON 2 MG/ML
4 INJECTION INTRAMUSCULAR; INTRAVENOUS EVERY 6 HOURS PRN
Status: DISCONTINUED | OUTPATIENT
Start: 2021-04-12 | End: 2021-04-15

## 2021-04-12 RX ORDER — PANTOPRAZOLE SODIUM 20 MG/1
20 TABLET, DELAYED RELEASE ORAL
Refills: 2 | Status: DISCONTINUED | OUTPATIENT
Start: 2021-04-13 | End: 2021-04-16

## 2021-04-12 RX ORDER — DEXTROSE MONOHYDRATE 25 G/50ML
50 INJECTION, SOLUTION INTRAVENOUS
Status: DISCONTINUED | OUTPATIENT
Start: 2021-04-12 | End: 2021-04-16

## 2021-04-12 RX ORDER — ACETAMINOPHEN 500 MG
1000 TABLET ORAL ONCE
Status: DISCONTINUED | OUTPATIENT
Start: 2021-04-12 | End: 2021-04-12

## 2021-04-12 RX ORDER — ROSUVASTATIN CALCIUM 20 MG/1
40 TABLET, COATED ORAL DAILY
Status: DISCONTINUED | OUTPATIENT
Start: 2021-04-13 | End: 2021-04-16

## 2021-04-12 RX ORDER — FLUTICASONE PROPIONATE 50 MCG
2 SPRAY, SUSPENSION (ML) NASAL DAILY
Status: DISCONTINUED | OUTPATIENT
Start: 2021-04-12 | End: 2021-04-16

## 2021-04-12 RX ORDER — DIPHENHYDRAMINE HCL 25 MG
25 CAPSULE ORAL NIGHTLY PRN
Status: DISCONTINUED | OUTPATIENT
Start: 2021-04-12 | End: 2021-04-16

## 2021-04-12 RX ORDER — MIDAZOLAM HYDROCHLORIDE 1 MG/ML
INJECTION INTRAMUSCULAR; INTRAVENOUS AS NEEDED
Status: DISCONTINUED | OUTPATIENT
Start: 2021-04-12 | End: 2021-04-12 | Stop reason: SURG

## 2021-04-12 RX ORDER — SODIUM CHLORIDE AND POTASSIUM CHLORIDE .9; .15 G/100ML; G/100ML
SOLUTION INTRAVENOUS CONTINUOUS
Status: DISCONTINUED | OUTPATIENT
Start: 2021-04-12 | End: 2021-04-16

## 2021-04-12 RX ORDER — DULOXETIN HYDROCHLORIDE 30 MG/1
60 CAPSULE, DELAYED RELEASE ORAL EVERY MORNING
Status: DISCONTINUED | OUTPATIENT
Start: 2021-04-13 | End: 2021-04-16

## 2021-04-12 RX ADMIN — ROCURONIUM BROMIDE 50 MG: 10 INJECTION, SOLUTION INTRAVENOUS at 10:35:00

## 2021-04-12 RX ADMIN — ROCURONIUM BROMIDE 30 MG: 10 INJECTION, SOLUTION INTRAVENOUS at 11:49:00

## 2021-04-12 RX ADMIN — BUPIVACAINE HYDROCHLORIDE AND EPINEPHRINE 60 ML: 2.5; 5 INJECTION, SOLUTION EPIDURAL; INFILTRATION; INTRACAUDAL; PERINEURAL at 12:45:00

## 2021-04-12 RX ADMIN — MIDAZOLAM HYDROCHLORIDE 2 MG: 1 INJECTION INTRAMUSCULAR; INTRAVENOUS at 10:32:00

## 2021-04-12 RX ADMIN — SODIUM CHLORIDE, SODIUM LACTATE, POTASSIUM CHLORIDE, CALCIUM CHLORIDE: 600; 310; 30; 20 INJECTION, SOLUTION INTRAVENOUS at 12:21:00

## 2021-04-12 RX ADMIN — LIDOCAINE HYDROCHLORIDE 50 MG: 10 INJECTION, SOLUTION EPIDURAL; INFILTRATION; INTRACAUDAL; PERINEURAL at 10:35:00

## 2021-04-12 RX ADMIN — DEXAMETHASONE SODIUM PHOSPHATE 8 MG: 4 MG/ML VIAL (ML) INJECTION at 10:45:00

## 2021-04-12 RX ADMIN — ROCURONIUM BROMIDE 30 MG: 10 INJECTION, SOLUTION INTRAVENOUS at 11:12:00

## 2021-04-12 RX ADMIN — CLINDAMYCIN PHOSPHATE 900 MG: 900 INJECTION INTRAVENOUS at 10:40:00

## 2021-04-12 RX ADMIN — ONDANSETRON 4 MG: 2 INJECTION INTRAMUSCULAR; INTRAVENOUS at 12:32:00

## 2021-04-12 RX ADMIN — DEXAMETHASONE SODIUM PHOSPHATE 4 MG: 10 INJECTION, SOLUTION INTRAMUSCULAR; INTRAVENOUS at 12:45:00

## 2021-04-12 NOTE — ANESTHESIA PREPROCEDURE EVALUATION
PRE-OP EVALUATION    Patient Name: Alin Noble    Admit Diagnosis: Ventral hernia without obstruction or gangrene [K43.9]    Pre-op Diagnosis: Ventral hernia without obstruction or gangrene [K43.9]    REPAIR COMPLEX VENTRAL HERNIA WITH MESH; Merlin Jeronimo needed for Pain., Disp: , Rfl: , 4/12/2021 at 0600  omeprazole 20 MG Oral Capsule Delayed Release, Take 1 capsule (20 mg total) by mouth 2 (two) times daily before meals. , Disp: 180 capsule, Rfl: 2, 4/11/2021 at 0800  alendronate 70 MG Oral Tab, Take 1 tab month at Unknown time        Allergies:  Ace Inhibitors, Codeine, Iodine (Topical), Meloxicam, Morphine, Radiology Contrast Iodinated Dyes, Adhesive Tape, Compazine, Lipitor [Atorvastatin Calcium], Prochlorperazine, Atorvastatin, Cephalosporins, Hydrocodone Kendra Anaya MD at [address] [facility] N/A 4/1/2015    Performed by Tarun Camargo MD at [address] [facility]   • NEEDLE BIOPSY LEFT  ?   • NEEDLE BIOPSY RIGHT  ?   • OTHER  2008    removed benign tumor lt   • REMOVAL OF 103 01/14/2021    CO2 34.0 (H) 01/14/2021    BUN 12 01/14/2021    CREATSERUM 0.88 01/14/2021     (H) 01/14/2021    CA 10.6 (H) 01/14/2021             ASA: 3   Plan: general                             Present on Admission:  **None**

## 2021-04-12 NOTE — BRIEF OP NOTE
Pre-Operative Diagnosis: Ventral hernia without obstruction or gangrene [056432]     Post-Operative Diagnosis: Ventral hernia without obstruction or gangrene [769491]      Procedure Performed:   REPAIR COMPLEX VENTRAL HERNIA WITH MESH; BILATERAL COMPONENT

## 2021-04-12 NOTE — ANESTHESIA POSTPROCEDURE EVALUATION
[facility] Patient Status:  Inpatient   Age/Gender 70year old female MRN [id number]   Location [address] [facility] Attending Chao Zhang MD   [facility] Day # 0 PCP Anahi Paula MD       Anesthesia Post-op Note

## 2021-04-12 NOTE — PROGRESS NOTES
NURSING ADMISSION NOTE      Patient admitted via bed  Oriented to room. Safety precautions initiated. Bed in low position. Call light in reach.       Patient arrived from PACU in stable condition at [phone]: Paged Dr. Rosalee Galan for new consult

## 2021-04-12 NOTE — H&P
No chief complaint on file. HPI:    Austin Weir is a 70year old female who presents for evaluation of ventral hernia. Patient was evaluated in the hospital a couple weeks ago with right-sided abdominal pain.   Patient was found at that yaron Neuropathy     Left leg from knee down to foot   • Obesity, unspecified    • ASHLEY (obstructive sleep apnea) SPLIT NIGHT 3-29-16    AHI 51 RDI 53 SaO2 ko 79 % BIPAP 16/11 with O2 entrained @ 2 l/min [employer]   • Osteoarthritis    • Osteoarthritis knees    • Lalita Swan MD at [address]   • NEEDLE BIOPSY LEFT  ?   • NEEDLE BIOPSY RIGHT  ?   • OTHER  2008    removed benign tumor lt   • REMOVAL OF LUNG,LOBECTOMY  abt 1999    VATS right; told has a pseudotumor   • REMOVAL OF LUNG,LOBECTOMY COMMENTS)    Comment:pain  Cephalosporins          OTHER (SEE COMMENTS)    Comment:Doesn't remember  Hydrocodone             NAUSEA AND VOMITING  Latex                   RASH  Penicillins             RASH    Comment:^^^Per [facility] MAR received cefTRIAXone (RO proceed with surgery as her discomfort is affecting her quality of life the risk, benefits and alternatives were discussed in detail. 2.  Renal mass: Patient was found on her CT scan to have a right renal mass.   Ultrasound revealed this to be more cysti

## 2021-04-12 NOTE — ANESTHESIA PROCEDURE NOTES
Airway  Date/Time: 4/12/2021 10:36 AM  Urgency: elective    Airway not difficult    General Information and Staff    Patient location during procedure: OR  Anesthesiologist: Marilee Long MD  Resident/CRNA: Abel Gonzalez CRNA  Performed: CRNA     Indicat

## 2021-04-12 NOTE — ANESTHESIA PROCEDURE NOTES
[facility]  Performed by: Blu Arenas CRNA  Authorized by: Jose Pozo MD       General Information and Staff    Start Time:  4/12/2021 12:42 PM  End Time:  4/12/2021 12:52 PM  Anesthesiologist:  Jose Pozo MD  CRNA:  Blu Arenas CRNA  Per

## 2021-04-13 PROCEDURE — 82962 GLUCOSE BLOOD TEST: CPT

## 2021-04-13 PROCEDURE — 85027 COMPLETE CBC AUTOMATED: CPT | Performed by: SURGERY

## 2021-04-13 PROCEDURE — 80053 COMPREHEN METABOLIC PANEL: CPT | Performed by: SURGERY

## 2021-04-13 PROCEDURE — S0077 INJECTION, CLINDAMYCIN PHOSP: HCPCS | Performed by: SURGERY

## 2021-04-13 NOTE — PLAN OF CARE
A&OX4. Rates pain 10/10, patient used PCA. Denies nausea. Abdomen soft, round, and tender. Bowel sounds hypoactive. Abdominal incision JAMES. ABD pads, clean, dry, and intact. Abdominal binder in place. IVF infusing.  Nicole catheter in place with yellow urine nutritional intake (undernourished)  Description: INTERVENTIONS:  - Monitor percentage of each meal consumed  - Identify factors contributing to decreased intake, treat as appropriate  - Assist with meals as needed  - Monitor I&O, WT and lab values  - Obta

## 2021-04-13 NOTE — PLAN OF CARE
Pt a/ox4, 4L NC, ASHLEY, NSR on telemetry, denies n/v tolerating diet. C/o abd pain on PCA pump w/relief. Nicole draining clear yellow. Abd incision JAMES no drainage w/abd pad and binder. Rito w/bloody drainage. Accu QID. Ivf and abx infusing. Poc discussed.  Call contributing to decreased intake, treat as appropriate  - Assist with meals as needed  - Monitor I&O, WT and lab values  - Obtain nutritional consult as needed  - Optimize oral hygiene and moisture  - Encourage food from home; allow for food preferences  -

## 2021-04-13 NOTE — PLAN OF CARE
Patient is drowsy but oriented x4. Lung sounds clear/ diminished in all lobes. 4 L of oxygen via nasal cannula. ASHLEY- no- cpap. Abdomen is soft and tender. Incisions covered with ABD pads, tape, and abdominal binder. Patient tolerating clear liquids.  Denies Evaluate effectiveness of GI medications  - Encourage mobilization and activity  - Obtain nutritional consult as needed  - Establish a toileting routine/schedule  - Consider collaborating with pharmacy to review patient's medication profile  Outcome: Progr

## 2021-04-13 NOTE — PROGRESS NOTES
[facility]  Progress Note    Austin Weir Patient Status:  Inpatient    3/7/1950 MRN [id number]   [facility] 3NW-A Attending Soniya Flanagan MD   Hosp Day # 1 PCP Kenan Cheung MD     Subjective:    Patient reports feeling more inc Hyperparathyroidism ([facility] [facility]     Benzodiazepine dependence, continuous (HCC)     Chronic diastolic CHF (congestive heart failure) ([facility]     Major depressive disorder, recurrent, moderate ([facility]     Pulmonary hypertension due to COPD [facility])     Family conflict

## 2021-04-13 NOTE — CM/SW NOTE
04/13/21 0800   Discharge disposition   Expected discharge disposition Home or Self   Name of [address] [facility]       MSW, Charge and RN discussed patient's post d/c needs in care rounds.        PT Pending:

## 2021-04-13 NOTE — PROGRESS NOTES
[facility] Hospitalist History and Physical      Chief Complaint: medical management     PCP: Ten Graham MD      History of Present Illness: Patient is a 70year old female with PMH sig for afib, COPD, depression, DM, GERD, obesity presents for eval of repair Problems with swallowing     Due to nodules on thyroid RESOLVED WITH SURGERY   • Reflux    • Sleep apnea     cpap   • Status post thyroidectomy    • Stroke [facility])     Left side weakness    • [facility] ca ([facility] 6/27/2013   • Thyroid cancer ([facility]     localized, UMBILICAL MIHW,2+A/B,TXUIM  7/9/2007    by Dr Dio Mario in [place]   • [address] [address] Right 4/4/2018    Performed by Nohelia Au MD at [place] [address] medications on file.       Social History    Tobacco Use      Smoking status: Former Smoker        Packs/day: 3.00        Years: 50.00        Pack years: 150        Types: Cigarettes        Start date: 3/7/1961        Quit date: 1/1/1999        Years since HGB 11.0 04/13/2021    HCT 37.3 04/13/2021    .0 04/13/2021    CREATSERUM 0.87 04/13/2021    BUN 17 04/13/2021     04/13/2021    K 4.4 04/13/2021     04/13/2021    CO2 30.0 04/13/2021     04/13/2021    CA 9.9 04/13/2021    ALB 2.9

## 2021-04-13 NOTE — PROGRESS NOTES
[facility] Hospitalist Progress Note                                                                   Prabhu 45  3/7/1950    SUBJECTIVE:  Doing ok. Having some incisional pain.   No fever diphenhydrAMINE HCl, ondansetron HCl, diphenhydrAMINE, ondansetron HCl, glucose **OR** Glucose-Vitamin C **OR** dextrose **OR** glucose **OR** Glucose-Vitamin C    Assessment/Plan:  Active Problems:    * No active hospital problems.  *    # repair complex v

## 2021-04-14 PROCEDURE — 97116 GAIT TRAINING THERAPY: CPT

## 2021-04-14 PROCEDURE — 97161 PT EVAL LOW COMPLEX 20 MIN: CPT

## 2021-04-14 PROCEDURE — 85027 COMPLETE CBC AUTOMATED: CPT | Performed by: PHYSICIAN ASSISTANT

## 2021-04-14 PROCEDURE — 82962 GLUCOSE BLOOD TEST: CPT

## 2021-04-14 RX ORDER — POLYETHYLENE GLYCOL 3350 17 G/17G
17 POWDER, FOR SOLUTION ORAL DAILY
Status: DISCONTINUED | OUTPATIENT
Start: 2021-04-14 | End: 2021-04-16

## 2021-04-14 RX ORDER — CALCIUM CARBONATE 200(500)MG
500 TABLET,CHEWABLE ORAL 3 TIMES DAILY PRN
Status: DISCONTINUED | OUTPATIENT
Start: 2021-04-14 | End: 2021-04-16

## 2021-04-14 NOTE — PROGRESS NOTES
[facility] Hospitalist Progress Note                                                                   Prabhu 45  3/7/1950    SUBJECTIVE:  + flatus and hard stool.   Having issues with urin PRN: HYDROmorphone (DILAUDID) PCA bolus from pump, Naloxone HCl, diphenhydrAMINE HCl, ondansetron HCl, diphenhydrAMINE, ondansetron HCl, glucose **OR** Glucose-Vitamin C **OR** dextrose **OR** glucose **OR** Glucose-Vitamin C    Assessment/Plan:  Activ

## 2021-04-14 NOTE — PHYSICAL THERAPY NOTE
PHYSICAL THERAPY EVALUATION - INPATIENT     Room Number: 313/313-A  Evaluation Date: 4/14/2021  Type of Evaluation: Initial  Physician Order: PT Eval and Treat    Presenting Problem: ventral hernia repair  Reason for Therapy: Mobility Dysfunction and • Peripheral vascular disease (HCC)     varicose veins   • Pneumonia, organism unspecified(486)    • Problems with swallowing     Due to nodules on thyroid RESOLVED WITH SURGERY   • Reflux    • Sleep apnea     cpap   • Status post thyroidectomy    • Str REMOVAL OF LUNG,LOBECTOMY  abt 2000    VATS left; possibly sarcoid - bx with vasculitis 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   • REPAIR UMBILICAL BWNK,8+L/P,QQURF  7/9/2007    by Dr Augustin Rudolph in [place]   • [address] [facility] functional limits     Lower extremity ROM is within functional limits     Lower extremity strength is within functional limits     BALANCE                ADDITIONAL TESTS                                    NEUROLOGICAL FINDINGS                      ACTIVIT session/findings; All patient questions and concerns addressed;SCDs in place; Alarm set    ASSESSMENT   Patient is a 70year old female admitted on 4/12/2021 for ventral hernia repair.   Pertinent comorbidities and personal factors impacting therapy include O

## 2021-04-14 NOTE — PLAN OF CARE
Problem: GASTROINTESTINAL - ADULT  Goal: Minimal or absence of nausea and vomiting  Description: INTERVENTIONS:  - Maintain adequate hydration with IV or PO as ordered and tolerated  - Nasogastric tube to low intermittent suction as ordered  - Evaluate e hyperglycemia and hypoglycemia  - Administer ordered medications to maintain glucose within target range  - Assess barriers to adequate nutritional intake and initiate nutrition consult as needed  - Instruct patient on self management of diabetes  Outcome:

## 2021-04-14 NOTE — PLAN OF CARE
Abd soft and tender,pt states she feels bloated. Bs active. Midline incision with scant ss drainage, incision cleaned with CHG,new dressing applied. Pt was able to void 500 ml this morning. Pt ambulated in mariano.   Problem: GASTROINTESTINAL - ADULT  Goal: Mi

## 2021-04-14 NOTE — PROGRESS NOTES
[facility]  Progress Note    [facility] Patient Status:  Inpatient    3/7/1950 MRN [id number]   [facility] 3NW-A Attending Lalitha Vasquez MD   Hosp Day # 2 PCP Yuliet Cleary MD     Subjective:    Patient sitting up in chair, wor chronic diastolic congestive heart failure and stage 3 chronic kidney disease (HCC)     Type 2 diabetes mellitus with stage 3 chronic kidney disease, without long-term current use of insulin (HCC)     PAF (paroxysmal atrial fibrillation) ([facility]     Morbid o

## 2021-04-14 NOTE — CM/SW NOTE
04/14/21 1200   CM/SW Referral Data   Referral Source Social Work (self-referral)   Reason for Referral Discharge planning   Informant Children   Patient Info   Patient's Mental Status Alert;Oriented   Patient's [address]   Patient lives wi

## 2021-04-15 PROCEDURE — 36410 VNPNXR 3YR/> PHY/QHP DX/THER: CPT

## 2021-04-15 PROCEDURE — 97535 SELF CARE MNGMENT TRAINING: CPT

## 2021-04-15 PROCEDURE — 76937 US GUIDE VASCULAR ACCESS: CPT

## 2021-04-15 PROCEDURE — B546ZZA ULTRASONOGRAPHY OF RIGHT SUBCLAVIAN VEIN, GUIDANCE: ICD-10-PCS | Performed by: SURGERY

## 2021-04-15 PROCEDURE — 05H533Z INSERTION OF INFUSION DEVICE INTO RIGHT SUBCLAVIAN VEIN, PERCUTANEOUS APPROACH: ICD-10-PCS | Performed by: SURGERY

## 2021-04-15 PROCEDURE — 82962 GLUCOSE BLOOD TEST: CPT

## 2021-04-15 PROCEDURE — 97165 OT EVAL LOW COMPLEX 30 MIN: CPT

## 2021-04-15 RX ORDER — OXYCODONE HYDROCHLORIDE AND ACETAMINOPHEN 5; 325 MG/1; MG/1
2 TABLET ORAL EVERY 4 HOURS PRN
Status: DISCONTINUED | OUTPATIENT
Start: 2021-04-15 | End: 2021-04-16

## 2021-04-15 RX ORDER — OXYCODONE HYDROCHLORIDE AND ACETAMINOPHEN 5; 325 MG/1; MG/1
1 TABLET ORAL EVERY 4 HOURS PRN
Status: DISCONTINUED | OUTPATIENT
Start: 2021-04-15 | End: 2021-04-16

## 2021-04-15 NOTE — CM/SW NOTE
10:47am   MSW emailed Carolann list to pt's dtr to help pt make choice. MSW asked for choice after lunch. Dtr emailed MSW back and picked [employer].  Rn states no dc today    Barrier to [employer]:  Medical Clearance  Insurance auth for [employer]

## 2021-04-15 NOTE — OCCUPATIONAL THERAPY NOTE
OCCUPATIONAL THERAPY EVALUATION - INPATIENT     Room Number: 313/313-A  Evaluation Date: 4/15/2021  Type of Evaluation: Initial  Presenting Problem: s/p ventral hernia repair 4/13/21    Physician Order: Lenea Delcid Consult to Occupational Therapy  Reason for [facility] from knee down to foot   • Obesity, unspecified    • ASHLEY (obstructive sleep apnea) SPLIT NIGHT 3-29-16    AHI 51 RDI 53 SaO2 ko 79 % BIPAP 16/11 with O2 entrained @ 2 l/min [employer]   • Osteoarthritis    • Osteoarthritis knees    • Peripheral vascular dise Denae Overton MD at [address]   • NEEDLE BIOPSY LEFT  ?   • NEEDLE BIOPSY RIGHT  ?   • OTHER  2008    removed benign tumor lt   • REMOVAL OF LUNG,LOBECTOMY  abt 1999    VATS right; told has a pseudotumor   • REMOVAL OF LUNG,LOBECTOMY mechanics;Breathing techniques;Relaxation;Repositioning    COGNITION  Overall Cognitive Status:  WFL - within functional limits    Communication: WFL    Behavioral/Emotional/Social: WFL    RANGE OF MOTION AND STRENGTH ASSESSMENT  Upper extremity ROM is wit mobility to bathroom via RW, CGA, safety cues for RW use and increased time; education on toilet transfer technique, performed transfer min assist with cues for hand placement; patient requesting increased time on commode.  RN and PCT aware, present very en from skilled inpatient OT to address the above deficits, maximizing patient’s ability to return safely to her prior level of function.     Patient Complexity  Occupational Profile/Medical History LOW - Brief history including review of medical or therapy re

## 2021-04-15 NOTE — PLAN OF CARE
Patient ambulated in halls. Voids freely. CHG bath, dressing changed. Patient stated has taken percocet in the past without any side effects. RN gave 1 oxycodone for pain 9/10, reassessed patient, 9/10 2nd tablet given. POC discussed.  All questions and con

## 2021-04-15 NOTE — PROGRESS NOTES
Pt resting in recliner, easy non labored breathing. Vs wnl. Up with sb assist. cpox and telemetry in place. Pt tolerating full liquids. Midline with staples, jolynn x2, in place. Bilateral scd's pca in place for pain. Voids adequate amount.  Pm meds admin plan

## 2021-04-15 NOTE — PROGRESS NOTES
[facility]  Progress Note    Vj Lewis Patient Status:  Inpatient    3/7/1950 MRN [id number]   [facility] 3NW-A Attending Allison Elam MD   Hosp Day # 3 PCP Brenda Ortiz MD     Subjective:    Patient complains of pain.     Ob ([facility]     Hypertensive heart and kidney disease with chronic diastolic congestive heart failure and stage 3 chronic kidney disease (HCC)     Type 2 diabetes mellitus with stage 3 chronic kidney disease, without long-term current use of insulin ([facility]     PA

## 2021-04-15 NOTE — PROGRESS NOTES
[facility] Hospitalist Progress Note                                                                   Prabhu 45  3/7/1950    c c: follow up    SUBJECTIVE:  Urinating ok. Had small BM.  Qian Youssef HYDROmorphone (DILAUDID) PCA bolus from pump, Naloxone HCl, diphenhydrAMINE HCl, ondansetron HCl, diphenhydrAMINE, ondansetron HCl, glucose **OR** Glucose-Vitamin C **OR** dextrose **OR** glucose **OR** Glucose-Vitamin C    Assessment/Plan:  Active Problem

## 2021-04-16 VITALS
TEMPERATURE: 98 F | SYSTOLIC BLOOD PRESSURE: 138 MMHG | BODY MASS INDEX: 51.42 KG/M2 | WEIGHT: 279.44 LBS | HEART RATE: 75 BPM | DIASTOLIC BLOOD PRESSURE: 59 MMHG | HEIGHT: 62 IN | RESPIRATION RATE: 17 BRPM | OXYGEN SATURATION: 96 %

## 2021-04-16 PROCEDURE — 97530 THERAPEUTIC ACTIVITIES: CPT

## 2021-04-16 PROCEDURE — 97116 GAIT TRAINING THERAPY: CPT

## 2021-04-16 PROCEDURE — 82962 GLUCOSE BLOOD TEST: CPT

## 2021-04-16 RX ORDER — OXYCODONE HYDROCHLORIDE AND ACETAMINOPHEN 5; 325 MG/1; MG/1
1 TABLET ORAL EVERY 4 HOURS PRN
Qty: 20 TABLET | Refills: 0 | Status: SHIPPED | OUTPATIENT
Start: 2021-04-16 | End: 2021-04-29

## 2021-04-16 RX ORDER — AZITHROMYCIN 250 MG/1
TABLET, FILM COATED ORAL
Qty: 36 TABLET | Refills: 2 | Status: SHIPPED | OUTPATIENT
Start: 2021-04-16 | End: 2021-11-03

## 2021-04-16 NOTE — PHYSICAL THERAPY NOTE
PHYSICAL THERAPY TREATMENT NOTE - INPATIENT    Room Number: 624/197-U     Session: 1   Number of Visits to Meet Established Goals: 4    Presenting Problem: ventral hernia repair     History related to current admission: Pt is s/p ventral hernia repair.  PM unspecified(486)    • Problems with swallowing     Due to nodules on thyroid RESOLVED WITH SURGERY   • Reflux    • Sleep apnea     cpap   • Status post thyroidectomy    • Stroke [facility])     Left side weakness    • Thyroid ca ([facility] 6/27/2013   • Thyroid cance Maegan Slaughter MD;  Location: [facility]   • [address]  abt 1986    vocal cord polyps   • M-SEDAJ BY  DALLIN PERFVANESSA [facility] 5+ YR N/A 4/1/2015    Procedure: LUMBAR EPIDURAL;  Surgeon: Maegan Slaughter MD;  Location: [facility]                         O2 WALK  Oxygen Therapy  SPO2% Ambulation on Oxygen: 2  Ambulation oxygen flow (liters per minute): 84    AM-PAC '6-Clicks' INPATIENT SHORT FORM - BASIC MOBILITY  How much diffic admitted on 4/12/2021 for ventral hernia repair. Pertinent comorbidities and personal factors impacting therapy include ASHLEY, COPD, CVA with left sided weakness. The patient presents with the following impairments decreased strength, balance, endurance, gai

## 2021-04-16 NOTE — PLAN OF CARE
NURSING DISCHARGE NOTE    Discharged Rehab facility via Ambulance. Accompanied by Support staff  Belongings Taken by patient/family. Pt verbalized understanding of discharge instructions. IVs discontinued. ABD pad over midline incision replaced.  Re treat as appropriate  - Assist with meals as needed  - Monitor I&O, WT and lab values  - Obtain nutritional consult as needed  - Optimize oral hygiene and moisture  - Encourage food from home; allow for food preferences  - Enhance eating environment  Outco

## 2021-04-16 NOTE — PROGRESS NOTES
[facility]  Progress Note    Marybeth Glance Patient Status:  Inpatient    3/7/1950 MRN [id number]   [facility] 3NW-A Attending Kevin Hong MD   Hosp Day # 4 PCP Amie Gil MD     Subjective:    Feeling better.   Pain controlled congestive heart failure and stage 3 chronic kidney disease (HCC)     Type 2 diabetes mellitus with stage 3 chronic kidney disease, without long-term current use of insulin (HCC)     PAF (paroxysmal atrial fibrillation) ([facility] [address]     Morbid obesity with BMI of

## 2021-04-16 NOTE — PLAN OF CARE
Pt vss, Aox4. Pt tele on; NSR, ST; asymptomatic. Pt weened from 4L to 2L o2; denies SOB, Denies CAROLA, o2 sats above 95-95% on monitor. Pt reports 2L o2 is her baseline. 2 CHARISSE drains; small amounts serosanguinous fluid.  Midline incision covered with 4x4, tape Optimize oral hygiene and moisture  - Encourage food from home; allow for food preferences  - Enhance eating environment  Outcome: Progressing  Goal: Achieves appropriate nutritional intake (bariatric)  Description: INTERVENTIONS:  - Monitor for over-consu

## 2021-04-16 NOTE — CM/SW NOTE
Insurance auth in place. DC PLAN:  DC time 6 pm  BLS for 2 LPM  Sushma Hopson [phone]  PCS completed  Dtr updated, Rn will update Pt.

## 2021-04-16 NOTE — CM/SW NOTE
11am  DC PLAN: Nilda Old GINGER     11:50am  MSW called Kevon Juan Manuel and left message.   2nd Message left at 12:37pm    1:50pm-MSW spoke to Dixie at 1200 Kareen Chang who will send message to [facility] to call MSW    2:10pm [facility] called MSW, she did not have correct s

## 2021-04-16 NOTE — PROGRESS NOTES
[facility] Hospitalist Progress Note                                                                   Prabhu 45  3/7/1950    c c: follow up    SUBJECTIVE:  Urinating ok. Had small BM.  [place] oxyCODONE-acetaminophen **OR** oxyCODONE-acetaminophen, Calcium Carbonate Antacid, diphenhydrAMINE, ondansetron HCl, glucose **OR** Glucose-Vitamin C **OR** dextrose **OR** glucose **OR** Glucose-Vitamin C    Assessment/Plan:  Active Problems:    * No acti

## 2021-04-21 NOTE — OPERATIVE REPORT
[facility]    PATIENT'S NAME: Ezra Beaver   ATTENDING PHYSICIAN: Kayden Valdez M.D. OPERATING PHYSICIAN: Kayden Valdez M.D.    PATIENT ACCOUNT#:   [de-identified]    LOCATION:  [address] [place] [address]  MEDICAL RECORD #:   [id number]       DATE OF BIRTH:  [birth year] showed incarcerated omentum and fatty tissue. These were excised back to healthy-appearing fascial tissues. It was quite apparent that midline closure could not be done without component separation.   Component separation was then done bilaterally by jeyson

## 2021-04-21 NOTE — DISCHARGE SUMMARY
[facility]  Discharge Summary    [address] Reeves Patient Status:  Inpatient    3/7/1950 MRN [id number]   [facility] 3NW-A Attending No att. providers found   Hosp Day # 4 PCP Link MD Jae     Date of Admission: 2021    Baljinder respiratory failure with hypoxia, on home oxygen therapy (HCC)     Meningioma (HCC)     Bronchiectasis (HCC)     COPD exacerbation (HCC)     Non-occlusive coronary artery disease     Abdominal pain, right lower quadrant     Periumbilical hernia     Renal m Succinate ER 25 MG Oral Tablet 24 Hr  Take 1 tablet (25 mg total) by mouth daily. , Normal, Disp-90 tablet, R-3    Levothyroxine Sodium 150 MCG Oral Tab  Take 1 tablet (150 mcg total) by mouth daily. , Normal, Disp-90 tablet, R-2**Patient requests 90 days stacy Tab    HYDROcodone-acetaminophen 5-325 MG Oral Tab          Follow up Visits:  Follow-up with Dr Luba Curran in 1 week            Layo Lawler [facility] Surgery  4/21/2021

## 2021-08-13 PROBLEM — J44.1 COPD EXACERBATION (HCC): Status: RESOLVED | Noted: 2020-10-26 | Resolved: 2021-08-13

## 2021-08-15 PROBLEM — G31.9 MILD CEREBRAL ATROPHY (HCC): Status: ACTIVE | Noted: 2021-08-15

## 2021-08-15 PROBLEM — Z63.8 FAMILY CONFLICT: Status: RESOLVED | Noted: 2018-04-02 | Resolved: 2021-08-15

## 2021-08-15 PROBLEM — K43.9 VENTRAL HERNIA WITHOUT OBSTRUCTION OR GANGRENE: Status: RESOLVED | Noted: 2018-05-15 | Resolved: 2021-08-15

## 2021-08-15 PROBLEM — K42.9 PERIUMBILICAL HERNIA: Status: RESOLVED | Noted: 2021-01-13 | Resolved: 2021-08-15

## 2021-08-15 PROBLEM — R10.31 ABDOMINAL PAIN, RIGHT LOWER QUADRANT: Status: RESOLVED | Noted: 2021-01-12 | Resolved: 2021-08-15

## 2021-09-16 PROBLEM — D50.9 IRON DEFICIENCY ANEMIA: Status: ACTIVE | Noted: 2021-09-16

## 2022-01-08 ENCOUNTER — LAB ENCOUNTER (OUTPATIENT)
Dept: LAB | Facility: HOSPITAL | Age: 72
End: 2022-01-08
Attending: INTERNAL MEDICINE
Payer: MEDICARE

## 2022-01-08 DIAGNOSIS — Z12.11 SPECIAL SCREENING FOR MALIGNANT NEOPLASMS, COLON: ICD-10-CM

## 2022-01-10 ENCOUNTER — ANESTHESIA EVENT (OUTPATIENT)
Dept: ENDOSCOPY | Facility: HOSPITAL | Age: 72
End: 2022-01-10
Payer: MEDICARE

## 2022-01-10 LAB — SARS-COV-2 RNA RESP QL NAA+PROBE: NOT DETECTED

## 2022-01-11 ENCOUNTER — HOSPITAL ENCOUNTER (OUTPATIENT)
Facility: HOSPITAL | Age: 72
Setting detail: HOSPITAL OUTPATIENT SURGERY
Discharge: HOME | End: 2022-01-11
Attending: INTERNAL MEDICINE | Admitting: INTERNAL MEDICINE
Payer: MEDICARE

## 2022-01-11 ENCOUNTER — ANESTHESIA (OUTPATIENT)
Dept: ENDOSCOPY | Facility: HOSPITAL | Age: 72
End: 2022-01-11
Payer: MEDICARE

## 2022-01-11 VITALS
SYSTOLIC BLOOD PRESSURE: 166 MMHG | OXYGEN SATURATION: 100 % | HEIGHT: 62 IN | TEMPERATURE: 99 F | WEIGHT: 272 LBS | BODY MASS INDEX: 50.05 KG/M2 | RESPIRATION RATE: 18 BRPM | DIASTOLIC BLOOD PRESSURE: 82 MMHG | HEART RATE: 79 BPM

## 2022-01-11 DIAGNOSIS — Z12.11 SPECIAL SCREENING FOR MALIGNANT NEOPLASMS, COLON: Primary | ICD-10-CM

## 2022-01-11 LAB — GLUCOSE BLD-MCNC: 133 MG/DL (ref 70–99)

## 2022-01-11 PROCEDURE — 82962 GLUCOSE BLOOD TEST: CPT

## 2022-01-11 PROCEDURE — 88305 TISSUE EXAM BY PATHOLOGIST: CPT | Performed by: INTERNAL MEDICINE

## 2022-01-11 PROCEDURE — 0DBM8ZX EXCISION OF DESCENDING COLON, VIA NATURAL OR ARTIFICIAL OPENING ENDOSCOPIC, DIAGNOSTIC: ICD-10-PCS | Performed by: INTERNAL MEDICINE

## 2022-01-11 RX ORDER — SODIUM CHLORIDE, SODIUM LACTATE, POTASSIUM CHLORIDE, CALCIUM CHLORIDE 600; 310; 30; 20 MG/100ML; MG/100ML; MG/100ML; MG/100ML
INJECTION, SOLUTION INTRAVENOUS CONTINUOUS
Status: DISCONTINUED | OUTPATIENT
Start: 2022-01-11 | End: 2022-01-11

## 2022-01-11 RX ORDER — ONDANSETRON 2 MG/ML
4 INJECTION INTRAMUSCULAR; INTRAVENOUS AS NEEDED
Status: DISCONTINUED | OUTPATIENT
Start: 2022-01-11 | End: 2022-01-11

## 2022-01-11 RX ORDER — MIDAZOLAM HYDROCHLORIDE 1 MG/ML
INJECTION INTRAMUSCULAR; INTRAVENOUS AS NEEDED
Status: DISCONTINUED | OUTPATIENT
Start: 2022-01-11 | End: 2022-01-11 | Stop reason: SURG

## 2022-01-11 RX ORDER — DEXTROSE MONOHYDRATE 25 G/50ML
50 INJECTION, SOLUTION INTRAVENOUS
Status: DISCONTINUED | OUTPATIENT
Start: 2022-01-11 | End: 2022-01-11

## 2022-01-11 RX ORDER — LIDOCAINE HYDROCHLORIDE 10 MG/ML
INJECTION, SOLUTION EPIDURAL; INFILTRATION; INTRACAUDAL; PERINEURAL AS NEEDED
Status: DISCONTINUED | OUTPATIENT
Start: 2022-01-11 | End: 2022-01-11 | Stop reason: SURG

## 2022-01-11 RX ORDER — HYDROMORPHONE HYDROCHLORIDE 1 MG/ML
0.4 INJECTION, SOLUTION INTRAMUSCULAR; INTRAVENOUS; SUBCUTANEOUS EVERY 5 MIN PRN
Status: DISCONTINUED | OUTPATIENT
Start: 2022-01-11 | End: 2022-01-11

## 2022-01-11 RX ORDER — NALOXONE HYDROCHLORIDE 0.4 MG/ML
80 INJECTION, SOLUTION INTRAMUSCULAR; INTRAVENOUS; SUBCUTANEOUS AS NEEDED
Status: DISCONTINUED | OUTPATIENT
Start: 2022-01-11 | End: 2022-01-11

## 2022-01-11 RX ORDER — METOCLOPRAMIDE HYDROCHLORIDE 5 MG/ML
10 INJECTION INTRAMUSCULAR; INTRAVENOUS AS NEEDED
Status: DISCONTINUED | OUTPATIENT
Start: 2022-01-11 | End: 2022-01-11

## 2022-01-11 RX ADMIN — MIDAZOLAM HYDROCHLORIDE 2 MG: 1 INJECTION INTRAMUSCULAR; INTRAVENOUS at 08:47:00

## 2022-01-11 RX ADMIN — SODIUM CHLORIDE, SODIUM LACTATE, POTASSIUM CHLORIDE, CALCIUM CHLORIDE: 600; 310; 30; 20 INJECTION, SOLUTION INTRAVENOUS at 08:43:00

## 2022-01-11 RX ADMIN — SODIUM CHLORIDE, SODIUM LACTATE, POTASSIUM CHLORIDE, CALCIUM CHLORIDE: 600; 310; 30; 20 INJECTION, SOLUTION INTRAVENOUS at 09:13:00

## 2022-01-11 RX ADMIN — LIDOCAINE HYDROCHLORIDE 50 MG: 10 INJECTION, SOLUTION EPIDURAL; INFILTRATION; INTRACAUDAL; PERINEURAL at 08:49:00

## 2022-01-11 RX ADMIN — MIDAZOLAM HYDROCHLORIDE 2 MG: 1 INJECTION INTRAMUSCULAR; INTRAVENOUS at 08:51:00

## 2022-01-11 NOTE — DISCHARGE SUMMARY
[facility] ENDOSCOPY  Patients Name: Sallie Chi  Attending Physician: Arely Carney MD  Operating Physician: Fady Quinonez MD  CSN: [id number]     Location:  OR  MRN: [id number]    YOB: 1950  Admission Date: 1/11/2022  Operation Date: 1/11

## 2022-01-11 NOTE — ANESTHESIA POSTPROCEDURE EVALUATION
[facility] Patient Status:  Hospital [facility]patient [facility]   Age/Gender 70year old female MRN [id number]   Location [id number] [place] [address] Attending Ancelmo Levin MD   Hosp Day # 0 PCP MD Elizabeth Valentin

## 2022-01-11 NOTE — OPERATIVE REPORT
[address] OPERATIVE REPORT   PATIENT NAME: Quynh Rios  MRN: [id number]  DATE OF OPERATION: 1/11/2022  PREOPERATIVE DIAGNOSIS:   Screening for colorectal neoplasia   1.  Constipation     POSTOPERATIVE DIAGNOSES:  descending co pedunculated, 15 mm, @ 45 cm, resected via hot snare, clip placed at site   sigmoid colon diverticulosis       RECOMMENDATIONS:  If the polyps are adenomatous, then her next colonoscopy should be in 3 years.  If the polyps are hyperplastic, then her next co

## 2022-01-11 NOTE — ANESTHESIA PREPROCEDURE EVALUATION
PRE-OP EVALUATION    Patient Name: Andrea Martel    Admit Diagnosis: Special screening for malignant neoplasms, colon [Z12.11]    Pre-op Diagnosis: Special screening for malignant neoplasms, colon [Z12.11]    COLONOSCOPY    Anesthesia Procedure: COLON MONDAY, Disp: 12 tablet, Rfl: 1  ROSUVASTATIN CALCIUM 40 MG Oral Tab, TAKE 1 TABLET(40 MG) BY MOUTH DAILY, Disp: 90 tablet, Rfl: 3  dilTIAZem HCl ER Coated Beads 300 MG Oral Capsule SR 24 Hr, Take 1 capsule (300 mg total) by mouth daily. , Disp: 90 capsule, complications         GI/Hepatic/Renal      (+) GERD and well controlled                          Cardiovascular    Negative cardiovascular ROS.     Exercise tolerance: good     MET: >4    (+) obesity       (+) CAD                (+) CHF                Endo oxygen therapy (HCC)     Meningioma (HCC)     Bronchiectasis ([facility] [address]     Non-occlusive coronary artery disease     Renal mass     Generalized anxiety disorder     Mixed hyperlipidemia     Mild cerebral atrophy (HCC)     Iron deficiency anemia        Past Becky Procedure: LUMBAR EPIDURAL;  Surgeon: Laura Hatfield MD;  Location: [address] [place] [address]   • M-SEDAJ BY [facility] [address] [facility] [address]  [facility] YR  4/22/2015    Procedure: ;  Surgeon: Laura Hatfield MD;  Location: [address] [place] [address]   • NEEDLE BIOPS 4.70 11/02/2021    CL 98 11/02/2021    CO2 32.0 11/02/2021    BUN 13.0 11/02/2021    CREATSERUM 1.20 11/02/2021     (H) 11/02/2021            Airway      Mallampati: II  Mouth opening: >3 FB  TM distance: > 6 cm  Neck ROM: full Cardiovascular      R

## 2022-01-11 NOTE — H&P
HPI:  Sallie Chi is a 70year old female. 3/7/1950. Patient presents with:  Constipation           Thank you for sending your patient to see me for evaluation of  Constipation     Pt. presents for complaints of constipation. Pt has had for > 3m. [facility])       thyroid   • Chronic fatigue     • Community acquired pneumonia of left lower lobe of lung 6/5/2019     Resolved last addressed  3/19/18   • Complex tear of lateral meniscus of left knee as current injury 11/19/2019   • COPD (chronic obstructive [facility] in [place]   •        • COLONOSCOPY,BIOPSY   2009     findings: mild non-specific erythema asc colon; bx results unknown; by Dr Gabrielle Valle in [address] [place], [place] [address]marisela Ruff     C5-C7 fusion   • FLUOR GID Dennis Meza MD;  Location: [address] [place] [address]   • PATIENT [address] FOR IV ANTIBIOTIC SURGICAL SITE INFECTION PROPHYLAXIS.   4/22/2015     Procedure: ;  Surgeon: Dennis Meza MD;  Location: [address] [place] [address] levothyroxine 175 MCG Oral Tab Take 1 tablet (175 mcg total) by mouth daily. 90 tablet 3   • AZITHROMYCIN 250 MG Oral Tab TAKE 1 TABLET BY MOUTH EVERY MONDAY, WEDNESDAY, AND FRIDAY.  36 tablet 2   • oxybutynin 5 MG Oral Tab One tablet at bedtime (Patient no breakfast. 90 tablet 3   • Tiotropium Bromide Monohydrate (SPIRIVA RESPIMAT) 2.5 MCG/ACT Inhalation Aero Soln Inhale 2 puffs into the lungs daily.  3 Inhaler 3   • albuterol 108 (90 Base) MCG/ACT Inhalation Aero Soln Inhale 2 puffs into the lungs every 4 (f intact     Laboratory data:   Component      Latest Ref Rng & Units 11/2/2021   WBC      4.00 - 10.00 10:3/uL 14.10 (H)   RBC      3.43 - 5.21 10:6/uL 5.00   Hemoglobin      11.3 - 14.9 g/dL 10.2 (L)   Hematocrit      32.5 - 46.2 % 37.3   MCV      80.0 - 9 lower quadrant of the abdomen are suggested.    ABDOMINAL WALL: Neha Poisson are 2 adjacent periumbilical hernias containing mesenteric fat and bowel.  The right-sided periumbilical hernia measures 7.4 x 5.7 x 8.6 cm in maximal dimensions and contains loops of sm       Procedure Data:  [facility]     PATIENT'S NAME: Anjelica Soto   ATTENDING PHYSICIAN: Mehul Randle M.D. OPERATING PHYSICIAN: Mehul Randle M.D.    PATIENT ACCOUNT#: [de-identified]    LOCATION:  [address] [place] [address]  MEDICAL RECORD #:   [id number]

## 2022-01-21 NOTE — PROGRESS NOTES
1/21/2022  Carla Núñez  [address] [address]    Dear Ashlie Rizzo,       Here are the  biopsy/pathology findings from your recent Colonoscopy :    a hyperplastic polyp(s) which is a benign non-cancerous growth that was removed.       Reta Noland

## 2022-02-28 PROBLEM — E11.51 TYPE 2 DIABETES MELLITUS WITH DIABETIC PERIPHERAL ANGIOPATHY WITHOUT GANGRENE, WITHOUT LONG-TERM CURRENT USE OF INSULIN (HCC): Status: ACTIVE | Noted: 2022-02-28

## 2022-02-28 PROBLEM — I73.9 PERIPHERAL VASCULAR DISEASE (HCC): Status: ACTIVE | Noted: 2022-02-28

## 2022-02-28 PROBLEM — E11.59 CONTROLLED TYPE 2 DIABETES MELLITUS WITH CIRCULATORY DISORDER, WITHOUT LONG-TERM CURRENT USE OF INSULIN (HCC): Status: ACTIVE | Noted: 2022-02-28

## 2022-06-01 ENCOUNTER — ORDER TRANSCRIPTION (OUTPATIENT)
Dept: CARDIAC REHAB | Facility: HOSPITAL | Age: 72
End: 2022-06-01

## 2022-06-01 DIAGNOSIS — J44.9 OBSTRUCTIVE CHRONIC BRONCHITIS WITHOUT EXACERBATION (HCC): Primary | ICD-10-CM

## 2022-06-08 ENCOUNTER — APPOINTMENT (OUTPATIENT)
Dept: CARDIAC REHAB | Facility: HOSPITAL | Age: 72
End: 2022-06-08
Attending: INTERNAL MEDICINE
Payer: MEDICARE

## 2023-02-27 ENCOUNTER — HOSPITAL ENCOUNTER (INPATIENT)
Facility: HOSPITAL | Age: 73
LOS: 8 days | Discharge: HOME | End: 2023-03-07
Attending: HOSPITALIST | Admitting: HOSPITALIST
Payer: MEDICARE

## 2023-02-27 PROBLEM — R06.02 SHORTNESS OF BREATH: Status: ACTIVE | Noted: 2023-02-27

## 2023-02-27 LAB
ADENOVIRUS PCR:: NOT DETECTED
B PARAPERT DNA SPEC QL NAA+PROBE: NOT DETECTED
B PERT DNA SPEC QL NAA+PROBE: NOT DETECTED
C PNEUM DNA SPEC QL NAA+PROBE: NOT DETECTED
CORONAVIRUS 229E PCR:: NOT DETECTED
CORONAVIRUS HKU1 PCR:: NOT DETECTED
CORONAVIRUS NL63 PCR:: NOT DETECTED
CORONAVIRUS OC43 PCR:: NOT DETECTED
FLUAV RNA SPEC QL NAA+PROBE: NOT DETECTED
FLUBV RNA SPEC QL NAA+PROBE: NOT DETECTED
GLUCOSE BLD-MCNC: 128 MG/DL (ref 70–99)
METAPNEUMOVIRUS PCR:: NOT DETECTED
MYCOPLASMA PNEUMONIA PCR:: NOT DETECTED
PARAINFLUENZA 1 PCR:: NOT DETECTED
PARAINFLUENZA 2 PCR:: NOT DETECTED
PARAINFLUENZA 3 PCR:: NOT DETECTED
PARAINFLUENZA 4 PCR:: NOT DETECTED
RHINOVIRUS/ENTERO PCR:: DETECTED
RSV RNA SPEC QL NAA+PROBE: NOT DETECTED
SARS-COV-2 RNA NPH QL NAA+NON-PROBE: NOT DETECTED

## 2023-02-27 PROCEDURE — 87040 BLOOD CULTURE FOR BACTERIA: CPT | Performed by: HOSPITALIST

## 2023-02-27 PROCEDURE — 87449 NOS EACH ORGANISM AG IA: CPT | Performed by: HOSPITALIST

## 2023-02-27 PROCEDURE — 84145 PROCALCITONIN (PCT): CPT | Performed by: HOSPITALIST

## 2023-02-27 PROCEDURE — 94799 UNLISTED PULMONARY SVC/PX: CPT

## 2023-02-27 PROCEDURE — 82962 GLUCOSE BLOOD TEST: CPT

## 2023-02-27 PROCEDURE — 0202U NFCT DS 22 TRGT SARS-COV-2: CPT | Performed by: HOSPITALIST

## 2023-02-27 RX ORDER — ROSUVASTATIN CALCIUM 40 MG/1
40 TABLET, COATED ORAL NIGHTLY
Status: DISCONTINUED | OUTPATIENT
Start: 2023-02-27 | End: 2023-03-07

## 2023-02-27 RX ORDER — GABAPENTIN 300 MG/1
600 CAPSULE ORAL NIGHTLY
Status: DISCONTINUED | OUTPATIENT
Start: 2023-02-28 | End: 2023-03-07

## 2023-02-27 RX ORDER — BENZONATATE 100 MG/1
100 CAPSULE ORAL 3 TIMES DAILY PRN
Status: DISCONTINUED | OUTPATIENT
Start: 2023-02-27 | End: 2023-03-06

## 2023-02-27 RX ORDER — METOPROLOL SUCCINATE 25 MG/1
25 TABLET, EXTENDED RELEASE ORAL
Status: DISCONTINUED | OUTPATIENT
Start: 2023-02-28 | End: 2023-03-07

## 2023-02-27 RX ORDER — TIRZEPATIDE 5 MG/.5ML
5 INJECTION, SOLUTION SUBCUTANEOUS WEEKLY
COMMUNITY
Start: 2023-02-13

## 2023-02-27 RX ORDER — DEXTROSE MONOHYDRATE 25 G/50ML
50 INJECTION, SOLUTION INTRAVENOUS
Status: DISCONTINUED | OUTPATIENT
Start: 2023-02-27 | End: 2023-03-07

## 2023-02-27 RX ORDER — NICOTINE POLACRILEX 4 MG
30 LOZENGE BUCCAL
Status: DISCONTINUED | OUTPATIENT
Start: 2023-02-27 | End: 2023-03-07

## 2023-02-27 RX ORDER — IPRATROPIUM BROMIDE AND ALBUTEROL SULFATE 2.5; .5 MG/3ML; MG/3ML
3 SOLUTION RESPIRATORY (INHALATION)
Status: DISCONTINUED | OUTPATIENT
Start: 2023-02-27 | End: 2023-02-28

## 2023-02-27 RX ORDER — ACETAMINOPHEN 500 MG
500 TABLET ORAL EVERY 4 HOURS PRN
Status: DISCONTINUED | OUTPATIENT
Start: 2023-02-27 | End: 2023-03-07

## 2023-02-27 RX ORDER — NICOTINE POLACRILEX 4 MG
15 LOZENGE BUCCAL
Status: DISCONTINUED | OUTPATIENT
Start: 2023-02-27 | End: 2023-03-07

## 2023-02-27 RX ORDER — DULOXETIN HYDROCHLORIDE 30 MG/1
60 CAPSULE, DELAYED RELEASE ORAL EVERY MORNING
Status: DISCONTINUED | OUTPATIENT
Start: 2023-02-28 | End: 2023-03-07

## 2023-02-27 RX ORDER — PANTOPRAZOLE SODIUM 40 MG/1
40 TABLET, DELAYED RELEASE ORAL
Status: DISCONTINUED | OUTPATIENT
Start: 2023-02-28 | End: 2023-03-07

## 2023-02-27 RX ORDER — TRAZODONE HYDROCHLORIDE 100 MG/1
300 TABLET ORAL NIGHTLY PRN
Status: DISCONTINUED | OUTPATIENT
Start: 2023-02-27 | End: 2023-03-07

## 2023-02-27 RX ORDER — ALBUTEROL SULFATE 90 UG/1
2 AEROSOL, METERED RESPIRATORY (INHALATION) EVERY 4 HOURS PRN
Status: DISCONTINUED | OUTPATIENT
Start: 2023-02-27 | End: 2023-03-07

## 2023-02-27 RX ORDER — ZOLPIDEM TARTRATE 10 MG/1
1 TABLET ORAL NIGHTLY PRN
COMMUNITY
Start: 2023-01-13

## 2023-02-27 RX ORDER — CEFAZOLIN SODIUM/WATER 2 G/20 ML
2 SYRINGE (ML) INTRAVENOUS EVERY 8 HOURS
Status: COMPLETED | OUTPATIENT
Start: 2023-02-27 | End: 2023-03-06

## 2023-02-27 RX ORDER — ASPIRIN 81 MG/1
81 TABLET ORAL DAILY
Status: DISCONTINUED | OUTPATIENT
Start: 2023-02-28 | End: 2023-03-07

## 2023-02-28 LAB
ALBUMIN SERPL-MCNC: 2.8 G/DL (ref 3.4–5)
ALBUMIN/GLOB SERPL: 0.6 {RATIO} (ref 1–2)
ALP LIVER SERPL-CCNC: 77 U/L
ALT SERPL-CCNC: 18 U/L
ANION GAP SERPL CALC-SCNC: 6 MMOL/L (ref 0–18)
AST SERPL-CCNC: 20 U/L (ref 15–37)
BILIRUB SERPL-MCNC: 0.3 MG/DL (ref 0.1–2)
BUN BLD-MCNC: 12 MG/DL (ref 7–18)
CALCIUM BLD-MCNC: 10.2 MG/DL (ref 8.5–10.1)
CHLORIDE SERPL-SCNC: 101 MMOL/L (ref 98–112)
CO2 SERPL-SCNC: 32 MMOL/L (ref 21–32)
CREAT BLD-MCNC: 0.93 MG/DL
ERYTHROCYTE [DISTWIDTH] IN BLOOD BY AUTOMATED COUNT: 20.1 %
GFR SERPLBLD BASED ON 1.73 SQ M-ARVRAT: 65 ML/MIN/1.73M2 (ref 60–?)
GLOBULIN PLAS-MCNC: 4.8 G/DL (ref 2.8–4.4)
GLUCOSE BLD-MCNC: 128 MG/DL (ref 70–99)
GLUCOSE BLD-MCNC: 157 MG/DL (ref 70–99)
GLUCOSE BLD-MCNC: 178 MG/DL (ref 70–99)
GLUCOSE BLD-MCNC: 200 MG/DL (ref 70–99)
GLUCOSE BLD-MCNC: 217 MG/DL (ref 70–99)
HCT VFR BLD AUTO: 35.2 %
HGB BLD-MCNC: 10.1 G/DL
L PNEUMO AG UR QL: NEGATIVE
MAGNESIUM SERPL-MCNC: 2.5 MG/DL (ref 1.6–2.6)
MCH RBC QN AUTO: 21.8 PG (ref 26–34)
MCHC RBC AUTO-ENTMCNC: 28.7 G/DL (ref 31–37)
MCV RBC AUTO: 76 FL
OSMOLALITY SERPL CALC.SUM OF ELEC: 291 MOSM/KG (ref 275–295)
PLATELET # BLD AUTO: 204 10(3)UL (ref 150–450)
POTASSIUM SERPL-SCNC: 3.7 MMOL/L (ref 3.5–5.1)
PROCALCITONIN SERPL-MCNC: <0.05 NG/ML (ref ?–0.16)
PROT SERPL-MCNC: 7.6 G/DL (ref 6.4–8.2)
RBC # BLD AUTO: 4.63 X10(6)UL
SODIUM SERPL-SCNC: 139 MMOL/L (ref 136–145)
STREP PNEUMO ANTIGEN, URINE: NEGATIVE
WBC # BLD AUTO: 6.9 X10(3) UL (ref 4–11)

## 2023-02-28 PROCEDURE — 82962 GLUCOSE BLOOD TEST: CPT

## 2023-02-28 PROCEDURE — 83735 ASSAY OF MAGNESIUM: CPT | Performed by: HOSPITALIST

## 2023-02-28 PROCEDURE — 94799 UNLISTED PULMONARY SVC/PX: CPT

## 2023-02-28 PROCEDURE — 94640 AIRWAY INHALATION TREATMENT: CPT

## 2023-02-28 PROCEDURE — 85027 COMPLETE CBC AUTOMATED: CPT | Performed by: HOSPITALIST

## 2023-02-28 PROCEDURE — 80053 COMPREHEN METABOLIC PANEL: CPT | Performed by: HOSPITALIST

## 2023-02-28 RX ORDER — GABAPENTIN 300 MG/1
600 CAPSULE ORAL NIGHTLY
COMMUNITY
Start: 2023-01-29

## 2023-02-28 RX ORDER — METHYLPREDNISOLONE SODIUM SUCCINATE 125 MG/2ML
60 INJECTION, POWDER, LYOPHILIZED, FOR SOLUTION INTRAMUSCULAR; INTRAVENOUS EVERY 8 HOURS
Status: DISCONTINUED | OUTPATIENT
Start: 2023-02-28 | End: 2023-03-06

## 2023-02-28 RX ORDER — POTASSIUM CHLORIDE 20 MEQ/1
40 TABLET, EXTENDED RELEASE ORAL ONCE
Status: COMPLETED | OUTPATIENT
Start: 2023-02-28 | End: 2023-02-28

## 2023-02-28 RX ORDER — AZITHROMYCIN 250 MG/1
500 TABLET, FILM COATED ORAL
Status: DISCONTINUED | OUTPATIENT
Start: 2023-03-01 | End: 2023-03-01

## 2023-02-28 RX ORDER — IPRATROPIUM BROMIDE AND ALBUTEROL SULFATE 2.5; .5 MG/3ML; MG/3ML
3 SOLUTION RESPIRATORY (INHALATION)
Status: DISCONTINUED | OUTPATIENT
Start: 2023-02-28 | End: 2023-03-07

## 2023-02-28 RX ORDER — ZOLPIDEM TARTRATE 5 MG/1
10 TABLET ORAL NIGHTLY
Status: DISCONTINUED | OUTPATIENT
Start: 2023-02-28 | End: 2023-03-07

## 2023-02-28 NOTE — PROGRESS NOTES
NURSING ADMISSION NOTE      Patient admitted via Ambulance  Oriented to room. Safety precautions initiated. Bed in low position. Call light in reach. Patient received from Immediate care clinic via EMS at 1915. Patient alert and oriented x 4. Maintaining O2 saturation WNL on room air. Non productive cough. Complaining of pain to the bilateral lower extremities. NSR on tele with PVC's. Patient continent of bladder and bowel. Stand by assist using cane. Safety precautions in place, call light within reach, bed alarm on.

## 2023-03-01 LAB
ANION GAP SERPL CALC-SCNC: 6 MMOL/L (ref 0–18)
BUN BLD-MCNC: 16 MG/DL (ref 7–18)
CALCIUM BLD-MCNC: 10.5 MG/DL (ref 8.5–10.1)
CHLORIDE SERPL-SCNC: 104 MMOL/L (ref 98–112)
CO2 SERPL-SCNC: 29 MMOL/L (ref 21–32)
CREAT BLD-MCNC: 0.95 MG/DL
ERYTHROCYTE [DISTWIDTH] IN BLOOD BY AUTOMATED COUNT: 19.6 %
GFR SERPLBLD BASED ON 1.73 SQ M-ARVRAT: 64 ML/MIN/1.73M2 (ref 60–?)
GLUCOSE BLD-MCNC: 165 MG/DL (ref 70–99)
GLUCOSE BLD-MCNC: 180 MG/DL (ref 70–99)
GLUCOSE BLD-MCNC: 182 MG/DL (ref 70–99)
GLUCOSE BLD-MCNC: 224 MG/DL (ref 70–99)
GLUCOSE BLD-MCNC: 267 MG/DL (ref 70–99)
HCT VFR BLD AUTO: 34 %
HGB BLD-MCNC: 9.9 G/DL
MAGNESIUM SERPL-MCNC: 2.5 MG/DL (ref 1.6–2.6)
MCH RBC QN AUTO: 22 PG (ref 26–34)
MCHC RBC AUTO-ENTMCNC: 29.1 G/DL (ref 31–37)
MCV RBC AUTO: 75.4 FL
OSMOLALITY SERPL CALC.SUM OF ELEC: 294 MOSM/KG (ref 275–295)
PLATELET # BLD AUTO: 234 10(3)UL (ref 150–450)
POTASSIUM SERPL-SCNC: 4 MMOL/L (ref 3.5–5.1)
POTASSIUM SERPL-SCNC: 4 MMOL/L (ref 3.5–5.1)
RBC # BLD AUTO: 4.51 X10(6)UL
SODIUM SERPL-SCNC: 139 MMOL/L (ref 136–145)
WBC # BLD AUTO: 8.5 X10(3) UL (ref 4–11)

## 2023-03-01 PROCEDURE — 94667 MNPJ CHEST WALL 1ST: CPT

## 2023-03-01 PROCEDURE — 85027 COMPLETE CBC AUTOMATED: CPT | Performed by: HOSPITALIST

## 2023-03-01 PROCEDURE — 94640 AIRWAY INHALATION TREATMENT: CPT

## 2023-03-01 PROCEDURE — 83735 ASSAY OF MAGNESIUM: CPT | Performed by: HOSPITALIST

## 2023-03-01 PROCEDURE — 82962 GLUCOSE BLOOD TEST: CPT

## 2023-03-01 PROCEDURE — 84132 ASSAY OF SERUM POTASSIUM: CPT | Performed by: HOSPITALIST

## 2023-03-01 PROCEDURE — 80048 BASIC METABOLIC PNL TOTAL CA: CPT | Performed by: HOSPITALIST

## 2023-03-01 RX ORDER — GUAIFENESIN 600 MG/1
600 TABLET, EXTENDED RELEASE ORAL 2 TIMES DAILY
Status: DISCONTINUED | OUTPATIENT
Start: 2023-03-01 | End: 2023-03-01

## 2023-03-01 RX ORDER — AZITHROMYCIN 250 MG/1
250 TABLET, FILM COATED ORAL
Status: DISCONTINUED | OUTPATIENT
Start: 2023-03-03 | End: 2023-03-07

## 2023-03-01 NOTE — PLAN OF CARE
Alert and oriented x4. On RA. Cpap at night. Denies any chest pain. NSR on tele. Continent of bowel and bladder. Denies pain. Up SBA. Call light within reach. Fall precautions in place.

## 2023-03-01 NOTE — PLAN OF CARE
Vitals stable, some scant wheezing remains in lungs with diminished breath sounds otherwise. Patient on IV steroids. Wears 2-3L at night at home per patient, she states she only has a concentrator and no portable tanks. Tried on room air this AM, did well at the start, but sats late morning were only 85-88%, placed back on 1L. Patient states she uses 1-2L prn during the day at home, but on a 60ft tube from the concentrator. Will update pulm and hospitalist.  Possible O2 walk before dc for portable tanks. Nonproductive dry strong cough that gives her rib soreness after coughing. Cough medication PRN TID but given at 0600 this AM already. Occasional PVC's with NSR. On AC for hx of afib, also on cardizem. No cpap at night at home despite ASHLEY. States she thought she had a loose stool this AM, but flushed it before RN could see. Hat placed in toilet, if next stool loose will order cdiff test protocol and send sample to lab in accordance with policy. POC thoroughly reviewed, patient verbalizes understanding. She still feels like she's not ready to be discharged today. Bed alarm, call light within reach.         Problem: RESPIRATORY - ADULT  Goal: Achieves optimal ventilation and oxygenation  Description: INTERVENTIONS:  - Assess for changes in respiratory status  - Assess for changes in mentation and behavior  - Position to facilitate oxygenation and minimize respiratory effort  - Oxygen supplementation based on oxygen saturation or ABGs  - Provide Smoking Cessation handout, if applicable  - Encourage broncho-pulmonary hygiene including cough, deep breathe, Incentive Spirometry  - Assess the need for suctioning and perform as needed  - Assess and instruct to report SOB or any respiratory difficulty  - Respiratory Therapy support as indicated  - Manage/alleviate anxiety  - Monitor for signs/symptoms of CO2 retention  Outcome: Progressing

## 2023-03-02 LAB
ANION GAP SERPL CALC-SCNC: 2 MMOL/L (ref 0–18)
BUN BLD-MCNC: 22 MG/DL (ref 7–18)
CALCIUM BLD-MCNC: 9.9 MG/DL (ref 8.5–10.1)
CHLORIDE SERPL-SCNC: 104 MMOL/L (ref 98–112)
CO2 SERPL-SCNC: 31 MMOL/L (ref 21–32)
CREAT BLD-MCNC: 0.96 MG/DL
ERYTHROCYTE [DISTWIDTH] IN BLOOD BY AUTOMATED COUNT: 19.9 %
GFR SERPLBLD BASED ON 1.73 SQ M-ARVRAT: 63 ML/MIN/1.73M2 (ref 60–?)
GLUCOSE BLD-MCNC: 162 MG/DL (ref 70–99)
GLUCOSE BLD-MCNC: 165 MG/DL (ref 70–99)
GLUCOSE BLD-MCNC: 182 MG/DL (ref 70–99)
GLUCOSE BLD-MCNC: 195 MG/DL (ref 70–99)
GLUCOSE BLD-MCNC: 200 MG/DL (ref 70–99)
GLUCOSE BLD-MCNC: 222 MG/DL (ref 70–99)
HCT VFR BLD AUTO: 33.1 %
HGB BLD-MCNC: 9.4 G/DL
MAGNESIUM SERPL-MCNC: 2.6 MG/DL (ref 1.6–2.6)
MCH RBC QN AUTO: 21.6 PG (ref 26–34)
MCHC RBC AUTO-ENTMCNC: 28.4 G/DL (ref 31–37)
MCV RBC AUTO: 75.9 FL
OSMOLALITY SERPL CALC.SUM OF ELEC: 292 MOSM/KG (ref 275–295)
PLATELET # BLD AUTO: 219 10(3)UL (ref 150–450)
POTASSIUM SERPL-SCNC: 4.9 MMOL/L (ref 3.5–5.1)
RBC # BLD AUTO: 4.36 X10(6)UL
SODIUM SERPL-SCNC: 137 MMOL/L (ref 136–145)
WBC # BLD AUTO: 10.8 X10(3) UL (ref 4–11)

## 2023-03-02 PROCEDURE — 94799 UNLISTED PULMONARY SVC/PX: CPT

## 2023-03-02 PROCEDURE — 94640 AIRWAY INHALATION TREATMENT: CPT

## 2023-03-02 PROCEDURE — 82962 GLUCOSE BLOOD TEST: CPT

## 2023-03-02 PROCEDURE — 80048 BASIC METABOLIC PNL TOTAL CA: CPT | Performed by: HOSPITALIST

## 2023-03-02 PROCEDURE — 83735 ASSAY OF MAGNESIUM: CPT | Performed by: HOSPITALIST

## 2023-03-02 PROCEDURE — 85027 COMPLETE CBC AUTOMATED: CPT | Performed by: HOSPITALIST

## 2023-03-02 PROCEDURE — 94668 MNPJ CHEST WALL SBSQ: CPT

## 2023-03-02 RX ORDER — TRAMADOL HYDROCHLORIDE 50 MG/1
200 TABLET ORAL NIGHTLY
Status: DISCONTINUED | OUTPATIENT
Start: 2023-03-02 | End: 2023-03-07

## 2023-03-02 RX ORDER — HYDROXYZINE HYDROCHLORIDE 25 MG/1
25 TABLET, FILM COATED ORAL NIGHTLY
Status: DISCONTINUED | OUTPATIENT
Start: 2023-03-02 | End: 2023-03-07

## 2023-03-02 RX ORDER — SODIUM CHLORIDE FOR INHALATION 3 %
4 VIAL, NEBULIZER (ML) INHALATION
Status: DISCONTINUED | OUTPATIENT
Start: 2023-03-02 | End: 2023-03-07

## 2023-03-02 NOTE — PLAN OF CARE
Assumed care of patient @1151. Patient is A&O x4, up standby assist with cane, no complaints of pain. Patient is normal sinus with PVCs, on 3L nasal cannula at rest. No chest pain, Shortness of breath with exertion. Patient is continent of bladder and bowel, last BM today, Fall precautions in place, bed and chair alarm in use, patient updated on plan of care. Problem: RESPIRATORY - ADULT  Goal: Achieves optimal ventilation and oxygenation  Description: INTERVENTIONS:  - Assess for changes in respiratory status  - Assess for changes in mentation and behavior  - Position to facilitate oxygenation and minimize respiratory effort  - Oxygen supplementation based on oxygen saturation or ABGs  - Provide Smoking Cessation handout, if applicable  - Encourage broncho-pulmonary hygiene including cough, deep breathe, Incentive Spirometry  - Assess the need for suctioning and perform as needed  - Assess and instruct to report SOB or any respiratory difficulty  - Respiratory Therapy support as indicated  - Manage/alleviate anxiety  - Monitor for signs/symptoms of CO2 retention  Outcome: Progressing     Problem: CARDIOVASCULAR - ADULT  Goal: Maintains optimal cardiac output and hemodynamic stability  Description: INTERVENTIONS:  - Monitor vital signs, rhythm, and trends  - Monitor for bleeding, hypotension and signs of decreased cardiac output  - Evaluate effectiveness of vasoactive medications to optimize hemodynamic stability  - Monitor arterial and/or venous puncture sites for bleeding and/or hematoma  - Assess quality of pulses, skin color and temperature  - Assess for signs of decreased coronary artery perfusion - ex.  Angina  - Evaluate fluid balance, assess for edema, trend weights  Outcome: Progressing  Goal: Absence of cardiac arrhythmias or at baseline  Description: INTERVENTIONS:  - Continuous cardiac monitoring, monitor vital signs, obtain 12 lead EKG if indicated  - Evaluate effectiveness of antiarrhythmic and heart rate control medications as ordered  - Initiate emergency measures for life threatening arrhythmias  - Monitor electrolytes and administer replacement therapy as ordered  Outcome: Progressing     Problem: SKIN/TISSUE INTEGRITY - ADULT  Goal: Skin integrity remains intact  Description: INTERVENTIONS  - Assess and document risk factors for pressure ulcer development  - Assess and document skin integrity  - Monitor for areas of redness and/or skin breakdown  - Initiate interventions, skin care algorithm/standards of care as needed  Outcome: Progressing

## 2023-03-02 NOTE — PLAN OF CARE
Assumed pt care at [address] [place] [address]. A&O x4. Tele rhythm NSR. SPO2 94% on 2L O2. Breath sounds diminished with expiratory crackles bilaterally. Pt voiding with no issues. No c/o chest pain or shortness of breath. Cough non-productive. Skin dry and intact. Bed is locked and in low position. Call light and personal items within reach. Reviewed plan of care and patient verbalizes understanding.      Problem: RESPIRATORY - ADULT  Goal: Achieves optimal ventilation and oxygenation  Description: INTERVENTIONS:  - Assess for changes in respiratory status  - Assess for changes in mentation and behavior  - Position to facilitate oxygenation and minimize respiratory effort  - Oxygen supplementation based on oxygen saturation or ABGs  - Provide Smoking Cessation handout, if applicable  - Encourage broncho-pulmonary hygiene including cough, deep breathe, Incentive Spirometry  - Assess the need for suctioning and perform as needed  - Assess and instruct to report SOB or any respiratory difficulty  - Respiratory Therapy support as indicated  - Manage/alleviate anxiety  - Monitor for signs/symptoms of CO2 retention  Outcome: Progressing

## 2023-03-03 LAB
ANION GAP SERPL CALC-SCNC: 3 MMOL/L (ref 0–18)
BUN BLD-MCNC: 29 MG/DL (ref 7–18)
CALCIUM BLD-MCNC: 10.2 MG/DL (ref 8.5–10.1)
CHLORIDE SERPL-SCNC: 104 MMOL/L (ref 98–112)
CO2 SERPL-SCNC: 30 MMOL/L (ref 21–32)
CREAT BLD-MCNC: 0.9 MG/DL
ERYTHROCYTE [DISTWIDTH] IN BLOOD BY AUTOMATED COUNT: 20 %
GFR SERPLBLD BASED ON 1.73 SQ M-ARVRAT: 68 ML/MIN/1.73M2 (ref 60–?)
GLUCOSE BLD-MCNC: 174 MG/DL (ref 70–99)
GLUCOSE BLD-MCNC: 190 MG/DL (ref 70–99)
GLUCOSE BLD-MCNC: 212 MG/DL (ref 70–99)
GLUCOSE BLD-MCNC: 229 MG/DL (ref 70–99)
GLUCOSE BLD-MCNC: 273 MG/DL (ref 70–99)
GLUCOSE BLD-MCNC: 303 MG/DL (ref 70–99)
HCT VFR BLD AUTO: 32 %
HGB BLD-MCNC: 9.4 G/DL
MAGNESIUM SERPL-MCNC: 2.8 MG/DL (ref 1.6–2.6)
MCH RBC QN AUTO: 22.5 PG (ref 26–34)
MCHC RBC AUTO-ENTMCNC: 29.4 G/DL (ref 31–37)
MCV RBC AUTO: 76.7 FL
OSMOLALITY SERPL CALC.SUM OF ELEC: 295 MOSM/KG (ref 275–295)
PLATELET # BLD AUTO: 218 10(3)UL (ref 150–450)
POTASSIUM SERPL-SCNC: 4.9 MMOL/L (ref 3.5–5.1)
RBC # BLD AUTO: 4.17 X10(6)UL
SODIUM SERPL-SCNC: 137 MMOL/L (ref 136–145)
WBC # BLD AUTO: 8.6 X10(3) UL (ref 4–11)

## 2023-03-03 PROCEDURE — 94640 AIRWAY INHALATION TREATMENT: CPT

## 2023-03-03 PROCEDURE — 80048 BASIC METABOLIC PNL TOTAL CA: CPT | Performed by: HOSPITALIST

## 2023-03-03 PROCEDURE — 83735 ASSAY OF MAGNESIUM: CPT | Performed by: HOSPITALIST

## 2023-03-03 PROCEDURE — 82962 GLUCOSE BLOOD TEST: CPT

## 2023-03-03 PROCEDURE — 85027 COMPLETE CBC AUTOMATED: CPT | Performed by: HOSPITALIST

## 2023-03-03 PROCEDURE — 94668 MNPJ CHEST WALL SBSQ: CPT

## 2023-03-03 PROCEDURE — 94799 UNLISTED PULMONARY SVC/PX: CPT

## 2023-03-03 NOTE — PLAN OF CARE
Assumed care for pt at 19:30 on 3/2    A&Ox4. Requesting her sleep aids, trazadone and ambien. Requested tramadol for arthritic pain- on her home med list. On call hospitalist paged, ordered and given. VSS on 3L. Coarse rhonchi lung sounds. NSR with BBB on tele, occasional PVCs. Ancef given. Continent of B&B. Up with cane and standby. See other note for desat study on RA. Plan: Daily weight, IV Ancef, IV solumedrol    Bed alarm on after 23:00. Call light in reach. Able to make needs known.

## 2023-03-03 NOTE — PROGRESS NOTES
03/02/23 1609   Mobility   O2 walk?  Yes   SPO2% on Room Air at Rest 92   SPO2% on Oxygen at Rest 95   At rest oxygen flow (liters per minute) 3   SPO2% Ambulation on Room Air 88   SPO2% Ambulation on Oxygen 91   Ambulation oxygen flow (liters per minute) 1

## 2023-03-03 NOTE — PROGRESS NOTES
03/02/23 2319 03/02/23 2320 03/02/23 2321   Oxygen Therapy   SpO2 (!) 82 % (!) 82 % (!) 83 %   O2 Device None (Room air) None (Room air) None (Room air)   O2 Flow Rate (L/min) 0 L/min 0 L/min 0 L/min      03/02/23 2322 03/02/23 2323 03/02/23 2324   Oxygen Therapy   SpO2 (!) 84 % (!) 84 % (!) 80 %   O2 Device None (Room air) None (Room air) None (Room air)   O2 Flow Rate (L/min) 0 L/min 0 L/min 0 L/min      03/02/23 2325 03/02/23 2326 03/02/23 2327   Oxygen Therapy   SpO2 (!) 84 % (!) 84 % 92 %   O2 Device None (Room air) None (Room air) Nasal cannula   O2 Flow Rate (L/min) 0 L/min 0 L/min 3 L/min

## 2023-03-04 LAB
ANION GAP SERPL CALC-SCNC: 3 MMOL/L (ref 0–18)
BUN BLD-MCNC: 28 MG/DL (ref 7–18)
CALCIUM BLD-MCNC: 10.3 MG/DL (ref 8.5–10.1)
CHLORIDE SERPL-SCNC: 101 MMOL/L (ref 98–112)
CO2 SERPL-SCNC: 33 MMOL/L (ref 21–32)
CREAT BLD-MCNC: 0.81 MG/DL
ERYTHROCYTE [DISTWIDTH] IN BLOOD BY AUTOMATED COUNT: 19.5 %
GFR SERPLBLD BASED ON 1.73 SQ M-ARVRAT: 77 ML/MIN/1.73M2 (ref 60–?)
GLUCOSE BLD-MCNC: 174 MG/DL (ref 70–99)
GLUCOSE BLD-MCNC: 186 MG/DL (ref 70–99)
GLUCOSE BLD-MCNC: 199 MG/DL (ref 70–99)
GLUCOSE BLD-MCNC: 206 MG/DL (ref 70–99)
GLUCOSE BLD-MCNC: 260 MG/DL (ref 70–99)
HCT VFR BLD AUTO: 33.2 %
HGB BLD-MCNC: 9.5 G/DL
MAGNESIUM SERPL-MCNC: 2.9 MG/DL (ref 1.6–2.6)
MCH RBC QN AUTO: 22.1 PG (ref 26–34)
MCHC RBC AUTO-ENTMCNC: 28.6 G/DL (ref 31–37)
MCV RBC AUTO: 77.2 FL
OSMOLALITY SERPL CALC.SUM OF ELEC: 294 MOSM/KG (ref 275–295)
PLATELET # BLD AUTO: 206 10(3)UL (ref 150–450)
POTASSIUM SERPL-SCNC: 5 MMOL/L (ref 3.5–5.1)
RBC # BLD AUTO: 4.3 X10(6)UL
SODIUM SERPL-SCNC: 137 MMOL/L (ref 136–145)
WBC # BLD AUTO: 7.7 X10(3) UL (ref 4–11)

## 2023-03-04 PROCEDURE — 83735 ASSAY OF MAGNESIUM: CPT | Performed by: HOSPITALIST

## 2023-03-04 PROCEDURE — 82962 GLUCOSE BLOOD TEST: CPT

## 2023-03-04 PROCEDURE — 94640 AIRWAY INHALATION TREATMENT: CPT

## 2023-03-04 PROCEDURE — 80048 BASIC METABOLIC PNL TOTAL CA: CPT | Performed by: HOSPITALIST

## 2023-03-04 PROCEDURE — 85027 COMPLETE CBC AUTOMATED: CPT | Performed by: HOSPITALIST

## 2023-03-04 NOTE — PLAN OF CARE
Problem: RESPIRATORY - ADULT  Goal: Achieves optimal ventilation and oxygenation  Description: INTERVENTIONS:  - Assess for changes in respiratory status  - Assess for changes in mentation and behavior  - Position to facilitate oxygenation and minimize respiratory effort  - Oxygen supplementation based on oxygen saturation or ABGs  - Provide Smoking Cessation handout, if applicable  - Encourage broncho-pulmonary hygiene including cough, deep breathe, Incentive Spirometry  - Assess the need for suctioning and perform as needed  - Assess and instruct to report SOB or any respiratory difficulty  - Respiratory Therapy support as indicated  - Manage/alleviate anxiety  - Monitor for signs/symptoms of CO2 retention  Outcome: Progressing     Problem: CARDIOVASCULAR - ADULT  Goal: Maintains optimal cardiac output and hemodynamic stability  Description: INTERVENTIONS:  - Monitor vital signs, rhythm, and trends  - Monitor for bleeding, hypotension and signs of decreased cardiac output  - Evaluate effectiveness of vasoactive medications to optimize hemodynamic stability  - Monitor arterial and/or venous puncture sites for bleeding and/or hematoma  - Assess quality of pulses, skin color and temperature  - Assess for signs of decreased coronary artery perfusion - ex.  Angina  - Evaluate fluid balance, assess for edema, trend weights  Outcome: Progressing  Goal: Absence of cardiac arrhythmias or at baseline  Description: INTERVENTIONS:  - Continuous cardiac monitoring, monitor vital signs, obtain 12 lead EKG if indicated  - Evaluate effectiveness of antiarrhythmic and heart rate control medications as ordered  - Initiate emergency measures for life threatening arrhythmias  - Monitor electrolytes and administer replacement therapy as ordered  Outcome: Progressing     Problem: SKIN/TISSUE INTEGRITY - ADULT  Goal: Skin integrity remains intact  Description: INTERVENTIONS  - Assess and document risk factors for pressure ulcer development  - Assess and document skin integrity  - Monitor for areas of redness and/or skin breakdown  - Initiate interventions, skin care algorithm/standards of care as needed  Outcome: Progressing

## 2023-03-04 NOTE — PLAN OF CARE
Patient is alert and oriented x 4. Up with standby with cane. Productive cough noted and cough med administered. Scheduled neb administered by RT. VSS. Continue to monitor I's and O's and diuresis. Wctm and notify service of any acute changes.     Problem: RESPIRATORY - ADULT  Goal: Achieves optimal ventilation and oxygenation  Description: INTERVENTIONS:  - Assess for changes in respiratory status  - Assess for changes in mentation and behavior  - Position to facilitate oxygenation and minimize respiratory effort  - Oxygen supplementation based on oxygen saturation or ABGs  - Provide Smoking Cessation handout, if applicable  - Encourage broncho-pulmonary hygiene including cough, deep breathe, Incentive Spirometry  - Assess the need for suctioning and perform as needed  - Assess and instruct to report SOB or any respiratory difficulty  - Respiratory Therapy support as indicated  - Manage/alleviate anxiety  - Monitor for signs/symptoms of CO2 retention  Outcome: Progressing

## 2023-03-04 NOTE — PLAN OF CARE
Assumed care around 1930. A/Ox4. On 1L w/ sats >90. Monitor shows NSR. Continent of bowel and bladder, last BM 3/3. No reports of pain. Up 1x cane. Plan to cont IV abx, IV steroids, sched nebs. Safety precautions in place, call light within reach, bed alarm on. Problem: RESPIRATORY - ADULT  Goal: Achieves optimal ventilation and oxygenation  Description: INTERVENTIONS:  - Assess for changes in respiratory status  - Assess for changes in mentation and behavior  - Position to facilitate oxygenation and minimize respiratory effort  - Oxygen supplementation based on oxygen saturation or ABGs  - Provide Smoking Cessation handout, if applicable  - Encourage broncho-pulmonary hygiene including cough, deep breathe, Incentive Spirometry  - Assess the need for suctioning and perform as needed  - Assess and instruct to report SOB or any respiratory difficulty  - Respiratory Therapy support as indicated  - Manage/alleviate anxiety  - Monitor for signs/symptoms of CO2 retention  Outcome: Progressing     Problem: CARDIOVASCULAR - ADULT  Goal: Maintains optimal cardiac output and hemodynamic stability  Description: INTERVENTIONS:  - Monitor vital signs, rhythm, and trends  - Monitor for bleeding, hypotension and signs of decreased cardiac output  - Evaluate effectiveness of vasoactive medications to optimize hemodynamic stability  - Monitor arterial and/or venous puncture sites for bleeding and/or hematoma  - Assess quality of pulses, skin color and temperature  - Assess for signs of decreased coronary artery perfusion - ex.  Angina  - Evaluate fluid balance, assess for edema, trend weights  Outcome: Progressing  Goal: Absence of cardiac arrhythmias or at baseline  Description: INTERVENTIONS:  - Continuous cardiac monitoring, monitor vital signs, obtain 12 lead EKG if indicated  - Evaluate effectiveness of antiarrhythmic and heart rate control medications as ordered  - Initiate emergency measures for life threatening arrhythmias  - Monitor electrolytes and administer replacement therapy as ordered  Outcome: Progressing     Problem: SKIN/TISSUE INTEGRITY - ADULT  Goal: Skin integrity remains intact  Description: INTERVENTIONS  - Assess and document risk factors for pressure ulcer development  - Assess and document skin integrity  - Monitor for areas of redness and/or skin breakdown  - Initiate interventions, skin care algorithm/standards of care as needed  Outcome: Progressing

## 2023-03-04 NOTE — PLAN OF CARE
Subjective: Patient is alert and oriented times four. She is very talkative. Objective: Patient is up in room with cane and stand by assist. She is sinus rhythm on monitor. Lungs with scattered crackles on auscultation. Assessment: Patient continues to have a cough occasionally  Productive of yellow sputum. She is on isolation for rhiinovirus    Plan: Prepare for possible discharge. Monitor cellulitis.

## 2023-03-05 LAB
ANION GAP SERPL CALC-SCNC: 0 MMOL/L (ref 0–18)
BUN BLD-MCNC: 26 MG/DL (ref 7–18)
CALCIUM BLD-MCNC: 9.9 MG/DL (ref 8.5–10.1)
CHLORIDE SERPL-SCNC: 100 MMOL/L (ref 98–112)
CO2 SERPL-SCNC: 35 MMOL/L (ref 21–32)
CREAT BLD-MCNC: 0.98 MG/DL
ERYTHROCYTE [DISTWIDTH] IN BLOOD BY AUTOMATED COUNT: 19 %
GFR SERPLBLD BASED ON 1.73 SQ M-ARVRAT: 61 ML/MIN/1.73M2 (ref 60–?)
GLUCOSE BLD-MCNC: 190 MG/DL (ref 70–99)
GLUCOSE BLD-MCNC: 207 MG/DL (ref 70–99)
GLUCOSE BLD-MCNC: 218 MG/DL (ref 70–99)
GLUCOSE BLD-MCNC: 264 MG/DL (ref 70–99)
GLUCOSE BLD-MCNC: 312 MG/DL (ref 70–99)
HCT VFR BLD AUTO: 33.4 %
HGB BLD-MCNC: 9.7 G/DL
MAGNESIUM SERPL-MCNC: 2.8 MG/DL (ref 1.6–2.6)
MCH RBC QN AUTO: 21.8 PG (ref 26–34)
MCHC RBC AUTO-ENTMCNC: 29 G/DL (ref 31–37)
MCV RBC AUTO: 75.2 FL
OSMOLALITY SERPL CALC.SUM OF ELEC: 291 MOSM/KG (ref 275–295)
PLATELET # BLD AUTO: 238 10(3)UL (ref 150–450)
POTASSIUM SERPL-SCNC: 5.2 MMOL/L (ref 3.5–5.1)
RBC # BLD AUTO: 4.44 X10(6)UL
SODIUM SERPL-SCNC: 135 MMOL/L (ref 136–145)
WBC # BLD AUTO: 8.4 X10(3) UL (ref 4–11)

## 2023-03-05 PROCEDURE — 85027 COMPLETE CBC AUTOMATED: CPT | Performed by: HOSPITALIST

## 2023-03-05 PROCEDURE — 80048 BASIC METABOLIC PNL TOTAL CA: CPT | Performed by: HOSPITALIST

## 2023-03-05 PROCEDURE — 94640 AIRWAY INHALATION TREATMENT: CPT

## 2023-03-05 PROCEDURE — 82962 GLUCOSE BLOOD TEST: CPT

## 2023-03-05 PROCEDURE — 83735 ASSAY OF MAGNESIUM: CPT | Performed by: HOSPITALIST

## 2023-03-05 RX ORDER — TRAMADOL HYDROCHLORIDE 50 MG/1
100 TABLET ORAL ONCE
Status: COMPLETED | OUTPATIENT
Start: 2023-03-05 | End: 2023-03-05

## 2023-03-05 NOTE — PLAN OF CARE
Patient alert and oriented x 4. Maintaining O2 saturation above 90% on 2L/min via NC. Up with stand by assist and cane. Productive cough, PRN med given. Safety precautions in place, call light within reach. Will continue to monitor. Problem: RESPIRATORY - ADULT  Goal: Achieves optimal ventilation and oxygenation  Description: INTERVENTIONS:  - Assess for changes in respiratory status  - Assess for changes in mentation and behavior  - Position to facilitate oxygenation and minimize respiratory effort  - Oxygen supplementation based on oxygen saturation or ABGs  - Provide Smoking Cessation handout, if applicable  - Encourage broncho-pulmonary hygiene including cough, deep breathe, Incentive Spirometry  - Assess the need for suctioning and perform as needed  - Assess and instruct to report SOB or any respiratory difficulty  - Respiratory Therapy support as indicated  - Manage/alleviate anxiety  - Monitor for signs/symptoms of CO2 retention  Outcome: Progressing     Problem: CARDIOVASCULAR - ADULT  Goal: Maintains optimal cardiac output and hemodynamic stability  Description: INTERVENTIONS:  - Monitor vital signs, rhythm, and trends  - Monitor for bleeding, hypotension and signs of decreased cardiac output  - Evaluate effectiveness of vasoactive medications to optimize hemodynamic stability  - Monitor arterial and/or venous puncture sites for bleeding and/or hematoma  - Assess quality of pulses, skin color and temperature  - Assess for signs of decreased coronary artery perfusion - ex.  Angina  - Evaluate fluid balance, assess for edema, trend weights  Outcome: Progressing  Goal: Absence of cardiac arrhythmias or at baseline  Description: INTERVENTIONS:  - Continuous cardiac monitoring, monitor vital signs, obtain 12 lead EKG if indicated  - Evaluate effectiveness of antiarrhythmic and heart rate control medications as ordered  - Initiate emergency measures for life threatening arrhythmias  - Monitor electrolytes and administer replacement therapy as ordered  Outcome: Progressing     Problem: SKIN/TISSUE INTEGRITY - ADULT  Goal: Skin integrity remains intact  Description: INTERVENTIONS  - Assess and document risk factors for pressure ulcer development  - Assess and document skin integrity  - Monitor for areas of redness and/or skin breakdown  - Initiate interventions, skin care algorithm/standards of care as needed  Outcome: Progressing

## 2023-03-05 NOTE — PLAN OF CARE
Assumed care of pt at 0730  Pt is a&o x4 and up with assistance with cane. Pt is on 1L oxygen at baseline with oxygen saturation of 97%. A persistent congested cough. Normal sinus rhythm. Pt endorsed 7 out of 10 back pain, relieved by tramadol. Pt is continent of bladder and bowels. Pt had BM today. Pt still with slight swelling to the left lower extremity. Redness resolved. Fall precautions in place. Call light in reach. Problem: RESPIRATORY - ADULT  Goal: Achieves optimal ventilation and oxygenation  Description: INTERVENTIONS:  - Assess for changes in respiratory status  - Assess for changes in mentation and behavior  - Position to facilitate oxygenation and minimize respiratory effort  - Oxygen supplementation based on oxygen saturation or ABGs  - Provide Smoking Cessation handout, if applicable  - Encourage broncho-pulmonary hygiene including cough, deep breathe, Incentive Spirometry  - Assess the need for suctioning and perform as needed  - Assess and instruct to report SOB or any respiratory difficulty  - Respiratory Therapy support as indicated  - Manage/alleviate anxiety  - Monitor for signs/symptoms of CO2 retention  Outcome: Progressing     Problem: CARDIOVASCULAR - ADULT  Goal: Maintains optimal cardiac output and hemodynamic stability  Description: INTERVENTIONS:  - Monitor vital signs, rhythm, and trends  - Monitor for bleeding, hypotension and signs of decreased cardiac output  - Evaluate effectiveness of vasoactive medications to optimize hemodynamic stability  - Monitor arterial and/or venous puncture sites for bleeding and/or hematoma  - Assess quality of pulses, skin color and temperature  - Assess for signs of decreased coronary artery perfusion - ex.  Angina  - Evaluate fluid balance, assess for edema, trend weights  Outcome: Progressing  Goal: Absence of cardiac arrhythmias or at baseline  Description: INTERVENTIONS:  - Continuous cardiac monitoring, monitor vital signs, obtain 12 lead EKG if indicated  - Evaluate effectiveness of antiarrhythmic and heart rate control medications as ordered  - Initiate emergency measures for life threatening arrhythmias  - Monitor electrolytes and administer replacement therapy as ordered  Outcome: Progressing     Problem: SKIN/TISSUE INTEGRITY - ADULT  Goal: Skin integrity remains intact  Description: INTERVENTIONS  - Assess and document risk factors for pressure ulcer development  - Assess and document skin integrity  - Monitor for areas of redness and/or skin breakdown  - Initiate interventions, skin care algorithm/standards of care as needed  Outcome: Progressing

## 2023-03-06 LAB
ANION GAP SERPL CALC-SCNC: <0 MMOL/L (ref 0–18)
BUN BLD-MCNC: 25 MG/DL (ref 7–18)
CALCIUM BLD-MCNC: 9.7 MG/DL (ref 8.5–10.1)
CHLORIDE SERPL-SCNC: 99 MMOL/L (ref 98–112)
CO2 SERPL-SCNC: 37 MMOL/L (ref 21–32)
CREAT BLD-MCNC: 0.9 MG/DL
ERYTHROCYTE [DISTWIDTH] IN BLOOD BY AUTOMATED COUNT: 18.9 %
GFR SERPLBLD BASED ON 1.73 SQ M-ARVRAT: 68 ML/MIN/1.73M2 (ref 60–?)
GLUCOSE BLD-MCNC: 189 MG/DL (ref 70–99)
GLUCOSE BLD-MCNC: 194 MG/DL (ref 70–99)
GLUCOSE BLD-MCNC: 263 MG/DL (ref 70–99)
GLUCOSE BLD-MCNC: 286 MG/DL (ref 70–99)
GLUCOSE BLD-MCNC: 372 MG/DL (ref 70–99)
HCT VFR BLD AUTO: 33.4 %
HGB BLD-MCNC: 9.8 G/DL
MAGNESIUM SERPL-MCNC: 2.8 MG/DL (ref 1.6–2.6)
MCH RBC QN AUTO: 22 PG (ref 26–34)
MCHC RBC AUTO-ENTMCNC: 29.3 G/DL (ref 31–37)
MCV RBC AUTO: 75.1 FL
OSMOLALITY SERPL CALC.SUM OF ELEC: 290 MOSM/KG (ref 275–295)
PLATELET # BLD AUTO: 233 10(3)UL (ref 150–450)
POTASSIUM SERPL-SCNC: 5.1 MMOL/L (ref 3.5–5.1)
RBC # BLD AUTO: 4.45 X10(6)UL
SODIUM SERPL-SCNC: 135 MMOL/L (ref 136–145)
WBC # BLD AUTO: 8.6 X10(3) UL (ref 4–11)

## 2023-03-06 PROCEDURE — 82962 GLUCOSE BLOOD TEST: CPT

## 2023-03-06 PROCEDURE — 94640 AIRWAY INHALATION TREATMENT: CPT

## 2023-03-06 PROCEDURE — 83735 ASSAY OF MAGNESIUM: CPT | Performed by: HOSPITALIST

## 2023-03-06 PROCEDURE — 85027 COMPLETE CBC AUTOMATED: CPT | Performed by: HOSPITALIST

## 2023-03-06 PROCEDURE — 80048 BASIC METABOLIC PNL TOTAL CA: CPT | Performed by: HOSPITALIST

## 2023-03-06 RX ORDER — BENZONATATE 100 MG/1
200 CAPSULE ORAL 3 TIMES DAILY
Status: DISCONTINUED | OUTPATIENT
Start: 2023-03-06 | End: 2023-03-07

## 2023-03-06 RX ORDER — PREDNISONE 20 MG/1
40 TABLET ORAL
Status: DISCONTINUED | OUTPATIENT
Start: 2023-03-07 | End: 2023-03-07

## 2023-03-06 NOTE — CM/SW NOTE
Patient is a 66 y/o female who admitted with hypoxia and shortness of breath. SW acknowledged order for [facility]. Met with patient, who was alert and oriented, to complete assessment. She lives at home with her daughter, MED, and two grandchildren (15 and 12 y/o). She normally ambulates with a cane or walker. She has home concentrator and portable tanks through [employer]. She stopped driving because of some of the medications she is prescribed. Discussed PHQ4, she actually just got off the phone with her psychiatrist (Dr. Marguerite Arguello) to schedule next visit. She also sees a therapist at his office who she really enjoys working with. She confirmed PCP Dr. Natali Elaine. She does not currently feel anxious and she has had depression for many years but feels it is managed well. Informed her of [facility] resources on AVS if needed. SW will continue to follow.      Josh Joy, [facility]  Discharge Planner  [phone]

## 2023-03-06 NOTE — PLAN OF CARE
Assumed care of pt at 0730  Pt is a&o x4, up with standby assistance with her cane. Pt is on 1L of oxygen at her baseline. Persistent congested cough. Lung sounds are clear. Pt is normal sinus rhythm with bundle branch block. Pt is continent of bladder and bowels. Skin is warm, dry and intact. Fall precautions in place. Call light in reach. Pt updated on plan of care. Problem: RESPIRATORY - ADULT  Goal: Achieves optimal ventilation and oxygenation  Description: INTERVENTIONS:  - Assess for changes in respiratory status  - Assess for changes in mentation and behavior  - Position to facilitate oxygenation and minimize respiratory effort  - Oxygen supplementation based on oxygen saturation or ABGs  - Provide Smoking Cessation handout, if applicable  - Encourage broncho-pulmonary hygiene including cough, deep breathe, Incentive Spirometry  - Assess the need for suctioning and perform as needed  - Assess and instruct to report SOB or any respiratory difficulty  - Respiratory Therapy support as indicated  - Manage/alleviate anxiety  - Monitor for signs/symptoms of CO2 retention  Outcome: Progressing     Problem: CARDIOVASCULAR - ADULT  Goal: Maintains optimal cardiac output and hemodynamic stability  Description: INTERVENTIONS:  - Monitor vital signs, rhythm, and trends  - Monitor for bleeding, hypotension and signs of decreased cardiac output  - Evaluate effectiveness of vasoactive medications to optimize hemodynamic stability  - Monitor arterial and/or venous puncture sites for bleeding and/or hematoma  - Assess quality of pulses, skin color and temperature  - Assess for signs of decreased coronary artery perfusion - ex.  Angina  - Evaluate fluid balance, assess for edema, trend weights  Outcome: Progressing  Goal: Absence of cardiac arrhythmias or at baseline  Description: INTERVENTIONS:  - Continuous cardiac monitoring, monitor vital signs, obtain 12 lead EKG if indicated  - Evaluate effectiveness of antiarrhythmic and heart rate control medications as ordered  - Initiate emergency measures for life threatening arrhythmias  - Monitor electrolytes and administer replacement therapy as ordered  Outcome: Progressing     Problem: SKIN/TISSUE INTEGRITY - ADULT  Goal: Skin integrity remains intact  Description: INTERVENTIONS  - Assess and document risk factors for pressure ulcer development  - Assess and document skin integrity  - Monitor for areas of redness and/or skin breakdown  - Initiate interventions, skin care algorithm/standards of care as needed  Outcome: Progressing

## 2023-03-06 NOTE — PLAN OF CARE
Assumed care for pt at 19:30 on 3/5    A&Ox4. Denies CAROLA, says she doesn't feel ready to go home tomorrow. Ambien, trazadone, seroquel, tramadol, hydroxyzine, given to help pt sleep- bed alarm on after 23:00. VSS on 1L. NSR with BBB on tele. Eliquis given. Ancef infused. Continent of B&B. Up with cane and standby. Prn robitussin given for non productive cough. Plan: Daily weight, solumedrol, ancef, nebs, qid     Bed alarm on. Call light in reach. Able to make needs known.

## 2023-03-07 VITALS
BODY MASS INDEX: 50.14 KG/M2 | SYSTOLIC BLOOD PRESSURE: 148 MMHG | RESPIRATION RATE: 18 BRPM | OXYGEN SATURATION: 94 % | WEIGHT: 272.5 LBS | HEIGHT: 62 IN | DIASTOLIC BLOOD PRESSURE: 59 MMHG | TEMPERATURE: 98 F | HEART RATE: 84 BPM

## 2023-03-07 LAB
ANION GAP SERPL CALC-SCNC: <0 MMOL/L (ref 0–18)
BUN BLD-MCNC: 30 MG/DL (ref 7–18)
CALCIUM BLD-MCNC: 9.9 MG/DL (ref 8.5–10.1)
CHLORIDE SERPL-SCNC: 102 MMOL/L (ref 98–112)
CO2 SERPL-SCNC: 33 MMOL/L (ref 21–32)
CREAT BLD-MCNC: 0.86 MG/DL
ERYTHROCYTE [DISTWIDTH] IN BLOOD BY AUTOMATED COUNT: 19.4 %
GFR SERPLBLD BASED ON 1.73 SQ M-ARVRAT: 71 ML/MIN/1.73M2 (ref 60–?)
GLUCOSE BLD-MCNC: 196 MG/DL (ref 70–99)
GLUCOSE BLD-MCNC: 197 MG/DL (ref 70–99)
GLUCOSE BLD-MCNC: 205 MG/DL (ref 70–99)
GLUCOSE BLD-MCNC: 236 MG/DL (ref 70–99)
HCT VFR BLD AUTO: 35.2 %
HGB BLD-MCNC: 10.3 G/DL
MAGNESIUM SERPL-MCNC: 2.8 MG/DL (ref 1.6–2.6)
MCH RBC QN AUTO: 21.8 PG (ref 26–34)
MCHC RBC AUTO-ENTMCNC: 29.3 G/DL (ref 31–37)
MCV RBC AUTO: 74.4 FL
OSMOLALITY SERPL CALC.SUM OF ELEC: 290 MOSM/KG (ref 275–295)
PLATELET # BLD AUTO: 285 10(3)UL (ref 150–450)
POTASSIUM SERPL-SCNC: 5 MMOL/L (ref 3.5–5.1)
RBC # BLD AUTO: 4.73 X10(6)UL
SODIUM SERPL-SCNC: 134 MMOL/L (ref 136–145)
WBC # BLD AUTO: 13.1 X10(3) UL (ref 4–11)

## 2023-03-07 PROCEDURE — 80048 BASIC METABOLIC PNL TOTAL CA: CPT | Performed by: HOSPITALIST

## 2023-03-07 PROCEDURE — 94640 AIRWAY INHALATION TREATMENT: CPT

## 2023-03-07 PROCEDURE — 85027 COMPLETE CBC AUTOMATED: CPT | Performed by: HOSPITALIST

## 2023-03-07 PROCEDURE — 82962 GLUCOSE BLOOD TEST: CPT

## 2023-03-07 PROCEDURE — 83735 ASSAY OF MAGNESIUM: CPT | Performed by: HOSPITALIST

## 2023-03-07 RX ORDER — BENZONATATE 200 MG/1
200 CAPSULE ORAL 3 TIMES DAILY
Qty: 90 CAPSULE | Refills: 0 | Status: SHIPPED | OUTPATIENT
Start: 2023-03-07 | End: 2023-04-06

## 2023-03-07 RX ORDER — IPRATROPIUM BROMIDE AND ALBUTEROL SULFATE 2.5; .5 MG/3ML; MG/3ML
3 SOLUTION RESPIRATORY (INHALATION) EVERY 6 HOURS PRN
Qty: 360 ML | Refills: 1 | Status: SHIPPED | OUTPATIENT
Start: 2023-03-07 | End: 2023-04-06

## 2023-03-07 RX ORDER — PREDNISONE 20 MG/1
TABLET ORAL
Qty: 15 TABLET | Refills: 0 | Status: SHIPPED | OUTPATIENT
Start: 2023-03-08 | End: 2023-03-20

## 2023-03-07 NOTE — PLAN OF CARE
Pt AOx4. C/O generalized fatigue. O2 sats maintained on 1 L, patient's basline, denies SOB. NSR on tele. C/o some chest soreness from nonproductive cough - little relief with tessalon pearls. Pt updated on plan of care. Problem: RESPIRATORY - ADULT  Goal: Achieves optimal ventilation and oxygenation  Description: INTERVENTIONS:  - Assess for changes in respiratory status  - Assess for changes in mentation and behavior  - Position to facilitate oxygenation and minimize respiratory effort  - Oxygen supplementation based on oxygen saturation or ABGs  - Provide Smoking Cessation handout, if applicable  - Encourage broncho-pulmonary hygiene including cough, deep breathe, Incentive Spirometry  - Assess the need for suctioning and perform as needed  - Assess and instruct to report SOB or any respiratory difficulty  - Respiratory Therapy support as indicated  - Manage/alleviate anxiety  - Monitor for signs/symptoms of CO2 retention  Outcome: Progressing     Problem: CARDIOVASCULAR - ADULT  Goal: Maintains optimal cardiac output and hemodynamic stability  Description: INTERVENTIONS:  - Monitor vital signs, rhythm, and trends  - Monitor for bleeding, hypotension and signs of decreased cardiac output  - Evaluate effectiveness of vasoactive medications to optimize hemodynamic stability  - Monitor arterial and/or venous puncture sites for bleeding and/or hematoma  - Assess quality of pulses, skin color and temperature  - Assess for signs of decreased coronary artery perfusion - ex.  Angina  - Evaluate fluid balance, assess for edema, trend weights  Outcome: Progressing  Goal: Absence of cardiac arrhythmias or at baseline  Description: INTERVENTIONS:  - Continuous cardiac monitoring, monitor vital signs, obtain 12 lead EKG if indicated  - Evaluate effectiveness of antiarrhythmic and heart rate control medications as ordered  - Initiate emergency measures for life threatening arrhythmias  - Monitor electrolytes and administer replacement therapy as ordered  Outcome: Progressing     Problem: SKIN/TISSUE INTEGRITY - ADULT  Goal: Skin integrity remains intact  Description: INTERVENTIONS  - Assess and document risk factors for pressure ulcer development  - Assess and document skin integrity  - Monitor for areas of redness and/or skin breakdown  - Initiate interventions, skin care algorithm/standards of care as needed  Outcome: Progressing     Problem: Patient/Family Goals  Goal: Patient/Family Long Term Goal  Description: Patient's Long Term Goal: \"go home\"    Interventions:  - PO antibiotics   - Nebulizer treatments   - Oxygen therapy as needed   - See additional Care Plan goals for specific interventions  Outcome: Progressing  Goal: Patient/Family Short Term Goal  Description: Patient's Short Term Goal: \"No cough\"    Interventions:   - Deep, breathe and cough   - Nebulizer treatments   - Fluids   - See additional Care Plan goals for specific interventions  Outcome: Progressing

## 2023-03-07 NOTE — PLAN OF CARE
Problem: RESPIRATORY - ADULT  Goal: Achieves optimal ventilation and oxygenation  Description: INTERVENTIONS:  - Assess for changes in respiratory status  - Assess for changes in mentation and behavior  - Position to facilitate oxygenation and minimize respiratory effort  - Oxygen supplementation based on oxygen saturation or ABGs  - Provide Smoking Cessation handout, if applicable  - Encourage broncho-pulmonary hygiene including cough, deep breathe, Incentive Spirometry  - Assess the need for suctioning and perform as needed  - Assess and instruct to report SOB or any respiratory difficulty  - Respiratory Therapy support as indicated  - Manage/alleviate anxiety  - Monitor for signs/symptoms of CO2 retention  Outcome: Progressing     Problem: CARDIOVASCULAR - ADULT  Goal: Maintains optimal cardiac output and hemodynamic stability  Description: INTERVENTIONS:  - Monitor vital signs, rhythm, and trends  - Monitor for bleeding, hypotension and signs of decreased cardiac output  - Evaluate effectiveness of vasoactive medications to optimize hemodynamic stability  - Monitor arterial and/or venous puncture sites for bleeding and/or hematoma  - Assess quality of pulses, skin color and temperature  - Assess for signs of decreased coronary artery perfusion - ex.  Angina  - Evaluate fluid balance, assess for edema, trend weights  Outcome: Progressing  Goal: Absence of cardiac arrhythmias or at baseline  Description: INTERVENTIONS:  - Continuous cardiac monitoring, monitor vital signs, obtain 12 lead EKG if indicated  - Evaluate effectiveness of antiarrhythmic and heart rate control medications as ordered  - Initiate emergency measures for life threatening arrhythmias  - Monitor electrolytes and administer replacement therapy as ordered  Outcome: Progressing     Problem: SKIN/TISSUE INTEGRITY - ADULT  Goal: Skin integrity remains intact  Description: INTERVENTIONS  - Assess and document risk factors for pressure ulcer development  - Assess and document skin integrity  - Monitor for areas of redness and/or skin breakdown  - Initiate interventions, skin care algorithm/standards of care as needed  Outcome: Progressing     Problem: Patient/Family Goals  Goal: Patient/Family Long Term Goal  Description: Patient's Long Term Goal: \"go home\"    Interventions:  - PO antibiotics   - Nebulizer treatments   - Oxygen therapy as needed   - See additional Care Plan goals for specific interventions  Outcome: Progressing  Goal: Patient/Family Short Term Goal  Description: Patient's Short Term Goal: \"No cough\"    Interventions:   - Deep, breathe and cough   - Nebulizer treatments   - Fluids   - See additional Care Plan goals for specific interventions  Outcome: Progressing

## 2023-03-08 NOTE — PLAN OF CARE
NURSING DISCHARGE NOTE    Discharged Home via Wheelchair. Accompanied by Support staff  Belongings Taken by patient/family. Tele and IV discontinued without complication. All discharge education and information provided to patient, all questions answered. Pt transported to [facility] with PCT.

## 2023-03-09 ENCOUNTER — PATIENT OUTREACH (OUTPATIENT)
Dept: CASE MANAGEMENT | Age: 73
End: 2023-03-09

## 2023-03-09 NOTE — PROGRESS NOTES
VM received; pt requesting assistance w/scheduling apt (dc 03/07)    Dr Lori Gonzalez, Sleep Disorders, [address]  [place] [id number]  Follow up 2 weeks

## 2023-03-09 NOTE — PROGRESS NOTES
Manuel Rodriguez   PULMONARY DISEASES, Sleep Disorders, Intensivist  [facility]  [address] [facility] [address] [place] [address]  [phone]  Follow up 2 weeks  Apt: March 22 @10:20am     No answer, LVM w/ apt info  Closing encounter

## 2023-03-10 NOTE — PROGRESS NOTES
Pt wants to make apt at the [address] location instead    [place] DISEASES, Sleep Disorders, [address]  [address]  [address]  [phone]  Follow up 2 weeks  Office to call -availability    Confirmed w/ pt   Closing encounter

## 2023-03-10 NOTE — PROGRESS NOTES
VM received; pt called back requesting assistance w/scheduling apt (dc 03/07) - pt advised that if she doesn't answer when you call, to call her right back again (she said it takes her awhile to get to the phone)

## 2024-01-01 NOTE — PLAN OF CARE
Patient here with acute hypoxemic respiratory therapy r/t pna and sarcoidosis per notes.   PO steroids  IV abx  Scheduled nebulizer treatments  Patient still wheezing and coughing  Tessalon pearls for cough  Patient refusing mucinex  On room air at rest, 2L cough, deep breathe, Incentive Spirometry  - Assess the need for suctioning and perform as needed  - Assess and instruct to report SOB or any respiratory difficulty  - Respiratory Therapy support as indicated  - Manage/alleviate anxiety  - Monitor for sign (2) cough or sneeze

## 2024-04-01 ENCOUNTER — APPOINTMENT (OUTPATIENT)
Dept: CT IMAGING | Facility: HOSPITAL | Age: 74
End: 2024-04-01
Attending: EMERGENCY MEDICINE
Payer: MEDICARE

## 2024-04-01 ENCOUNTER — HOSPITAL ENCOUNTER (OUTPATIENT)
Facility: HOSPITAL | Age: 74
Setting detail: OBSERVATION
Discharge: HOME HEALTH | End: 2024-04-02
Attending: EMERGENCY MEDICINE | Admitting: HOSPITALIST
Payer: MEDICARE

## 2024-04-01 DIAGNOSIS — R42 ACUTE ONSET OF SEVERE VERTIGO: Primary | ICD-10-CM

## 2024-04-01 LAB
ALBUMIN SERPL-MCNC: 3.3 G/DL (ref 3.4–5)
ALBUMIN/GLOB SERPL: 0.7 {RATIO} (ref 1–2)
ALP LIVER SERPL-CCNC: 83 U/L
ALT SERPL-CCNC: 15 U/L
ANION GAP SERPL CALC-SCNC: 4 MMOL/L (ref 0–18)
AST SERPL-CCNC: 16 U/L (ref 15–37)
BASOPHILS # BLD AUTO: 0.02 X10(3) UL (ref 0–0.2)
BASOPHILS NFR BLD AUTO: 0.2 %
BILIRUB SERPL-MCNC: 0.7 MG/DL (ref 0.1–2)
BUN BLD-MCNC: 16 MG/DL (ref 9–23)
CALCIUM BLD-MCNC: 10.5 MG/DL (ref 8.5–10.1)
CHLORIDE SERPL-SCNC: 97 MMOL/L (ref 98–112)
CO2 SERPL-SCNC: 37 MMOL/L (ref 21–32)
CREAT BLD-MCNC: 1.04 MG/DL
EGFRCR SERPLBLD CKD-EPI 2021: 56 ML/MIN/1.73M2 (ref 60–?)
EOSINOPHIL # BLD AUTO: 0.04 X10(3) UL (ref 0–0.7)
EOSINOPHIL NFR BLD AUTO: 0.4 %
ERYTHROCYTE [DISTWIDTH] IN BLOOD BY AUTOMATED COUNT: 22.5 %
GLOBULIN PLAS-MCNC: 4.7 G/DL (ref 2.8–4.4)
GLUCOSE BLD-MCNC: 81 MG/DL (ref 70–99)
HCT VFR BLD AUTO: 35.9 %
HGB BLD-MCNC: 10.3 G/DL
IMM GRANULOCYTES # BLD AUTO: 0.1 X10(3) UL (ref 0–1)
IMM GRANULOCYTES NFR BLD: 0.9 %
LYMPHOCYTES # BLD AUTO: 1.2 X10(3) UL (ref 1–4)
LYMPHOCYTES NFR BLD AUTO: 10.7 %
MCH RBC QN AUTO: 21.3 PG (ref 26–34)
MCHC RBC AUTO-ENTMCNC: 28.7 G/DL (ref 31–37)
MCV RBC AUTO: 74.2 FL
MONOCYTES # BLD AUTO: 1.45 X10(3) UL (ref 0.1–1)
MONOCYTES NFR BLD AUTO: 13 %
NEUTROPHILS # BLD AUTO: 8.38 X10 (3) UL (ref 1.5–7.7)
NEUTROPHILS # BLD AUTO: 8.38 X10(3) UL (ref 1.5–7.7)
NEUTROPHILS NFR BLD AUTO: 74.8 %
OSMOLALITY SERPL CALC.SUM OF ELEC: 286 MOSM/KG (ref 275–295)
PLATELET # BLD AUTO: 310 10(3)UL (ref 150–450)
PLATELET MORPHOLOGY: NORMAL
POTASSIUM SERPL-SCNC: 3.5 MMOL/L (ref 3.5–5.1)
PROT SERPL-MCNC: 8 G/DL (ref 6.4–8.2)
RBC # BLD AUTO: 4.84 X10(6)UL
SODIUM SERPL-SCNC: 138 MMOL/L (ref 136–145)
WBC # BLD AUTO: 11.2 X10(3) UL (ref 4–11)

## 2024-04-01 PROCEDURE — 36415 COLL VENOUS BLD VENIPUNCTURE: CPT

## 2024-04-01 PROCEDURE — 80053 COMPREHEN METABOLIC PANEL: CPT

## 2024-04-01 PROCEDURE — 85025 COMPLETE CBC W/AUTO DIFF WBC: CPT | Performed by: EMERGENCY MEDICINE

## 2024-04-01 PROCEDURE — 93010 ELECTROCARDIOGRAM REPORT: CPT

## 2024-04-01 PROCEDURE — 93005 ELECTROCARDIOGRAM TRACING: CPT

## 2024-04-01 PROCEDURE — 80053 COMPREHEN METABOLIC PANEL: CPT | Performed by: EMERGENCY MEDICINE

## 2024-04-01 PROCEDURE — 99285 EMERGENCY DEPT VISIT HI MDM: CPT

## 2024-04-01 PROCEDURE — 85025 COMPLETE CBC W/AUTO DIFF WBC: CPT

## 2024-04-01 PROCEDURE — 70450 CT HEAD/BRAIN W/O DYE: CPT | Performed by: EMERGENCY MEDICINE

## 2024-04-01 RX ORDER — MECLIZINE HYDROCHLORIDE 25 MG/1
25 TABLET ORAL ONCE
Status: COMPLETED | OUTPATIENT
Start: 2024-04-01 | End: 2024-04-01

## 2024-04-02 VITALS
WEIGHT: 245 LBS | SYSTOLIC BLOOD PRESSURE: 100 MMHG | TEMPERATURE: 98 F | OXYGEN SATURATION: 97 % | HEART RATE: 48 BPM | DIASTOLIC BLOOD PRESSURE: 52 MMHG | BODY MASS INDEX: 45.08 KG/M2 | HEIGHT: 62 IN | RESPIRATION RATE: 15 BRPM

## 2024-04-02 LAB
GLUCOSE BLD-MCNC: 119 MG/DL (ref 70–99)
GLUCOSE BLD-MCNC: 167 MG/DL (ref 70–99)
GLUCOSE BLD-MCNC: 89 MG/DL (ref 70–99)

## 2024-04-02 PROCEDURE — 97116 GAIT TRAINING THERAPY: CPT

## 2024-04-02 PROCEDURE — 93010 ELECTROCARDIOGRAM REPORT: CPT | Performed by: INTERNAL MEDICINE

## 2024-04-02 PROCEDURE — 92610 EVALUATE SWALLOWING FUNCTION: CPT

## 2024-04-02 PROCEDURE — 97161 PT EVAL LOW COMPLEX 20 MIN: CPT

## 2024-04-02 PROCEDURE — 82962 GLUCOSE BLOOD TEST: CPT

## 2024-04-02 PROCEDURE — 94660 CPAP INITIATION&MGMT: CPT

## 2024-04-02 PROCEDURE — 94799 UNLISTED PULMONARY SVC/PX: CPT

## 2024-04-02 PROCEDURE — 93005 ELECTROCARDIOGRAM TRACING: CPT

## 2024-04-02 RX ORDER — FLUTICASONE PROPIONATE 50 MCG
2 SPRAY, SUSPENSION (ML) NASAL DAILY
Status: DISCONTINUED | OUTPATIENT
Start: 2024-04-02 | End: 2024-04-02

## 2024-04-02 RX ORDER — PANTOPRAZOLE SODIUM 20 MG/1
20 TABLET, DELAYED RELEASE ORAL
Status: DISCONTINUED | OUTPATIENT
Start: 2024-04-02 | End: 2024-04-02

## 2024-04-02 RX ORDER — ONDANSETRON 2 MG/ML
4 INJECTION INTRAMUSCULAR; INTRAVENOUS EVERY 6 HOURS PRN
Status: DISCONTINUED | OUTPATIENT
Start: 2024-04-02 | End: 2024-04-02

## 2024-04-02 RX ORDER — SUVOREXANT 15 MG/1
15 TABLET, FILM COATED ORAL NIGHTLY
COMMUNITY

## 2024-04-02 RX ORDER — GABAPENTIN 300 MG/1
600 CAPSULE ORAL NIGHTLY
Status: DISCONTINUED | OUTPATIENT
Start: 2024-04-02 | End: 2024-04-02

## 2024-04-02 RX ORDER — FUROSEMIDE 40 MG/1
40 TABLET ORAL DAILY
Status: DISCONTINUED | OUTPATIENT
Start: 2024-04-02 | End: 2024-04-02

## 2024-04-02 RX ORDER — DULOXETIN HYDROCHLORIDE 30 MG/1
60 CAPSULE, DELAYED RELEASE ORAL EVERY MORNING
Status: DISCONTINUED | OUTPATIENT
Start: 2024-04-02 | End: 2024-04-02

## 2024-04-02 RX ORDER — MECLIZINE HCL 12.5 MG/1
25 TABLET ORAL 3 TIMES DAILY PRN
Qty: 30 TABLET | Refills: 0 | Status: SHIPPED | OUTPATIENT
Start: 2024-04-02

## 2024-04-02 RX ORDER — MECLIZINE HCL 12.5 MG/1
12.5 TABLET ORAL 3 TIMES DAILY PRN
Status: DISCONTINUED | OUTPATIENT
Start: 2024-04-02 | End: 2024-04-02

## 2024-04-02 RX ORDER — TRAMADOL HYDROCHLORIDE 50 MG/1
100 TABLET ORAL 2 TIMES DAILY
Status: DISCONTINUED | OUTPATIENT
Start: 2024-04-02 | End: 2024-04-02

## 2024-04-02 RX ORDER — METOPROLOL SUCCINATE 25 MG/1
25 TABLET, EXTENDED RELEASE ORAL
Status: DISCONTINUED | OUTPATIENT
Start: 2024-04-02 | End: 2024-04-02

## 2024-04-02 RX ORDER — ROSUVASTATIN CALCIUM 40 MG/1
40 TABLET, COATED ORAL DAILY
Status: DISCONTINUED | OUTPATIENT
Start: 2024-04-02 | End: 2024-04-02

## 2024-04-02 RX ORDER — TRAZODONE HYDROCHLORIDE 100 MG/1
300 TABLET ORAL NIGHTLY PRN
Status: DISCONTINUED | OUTPATIENT
Start: 2024-04-02 | End: 2024-04-02

## 2024-04-02 NOTE — ED INITIAL ASSESSMENT (HPI)
Patient reports she has been feeling dizzy since 11am yesterday. Reports it feels like the room is spinning and the dizziness is worse with movement.

## 2024-04-02 NOTE — PHYSICAL THERAPY NOTE
PHYSICAL THERAPY EVALUATION - INPATIENT     Room Number: 7613/7613-A  Evaluation Date: 2024  Type of Evaluation: Initial  Physician Order: PT Eval and Treat    Presenting Problem: increased dizziness  Co-Morbidities : afib, CHF, COPD, chronic respiratory failiure on home O2, DM2, stroke with L hemiplegia  Reason for Therapy: Mobility Dysfunction and Discharge Planning    PHYSICAL THERAPY ASSESSMENT   Patient is a 74 year old female admitted 2024 for increased dizziness, symptoms resolved at time of session.   Patient is currently functioning at baseline with bed mobility, transfers, and gait. Prior to admission, patient's baseline is ambulatory with occasional use of RW.     Patient will benefit from continued skilled PT Services at discharge to promote functional independence and safety with additional support and return home with home health PT.    PLAN  Patient has been evaluated and presents with no skilled Physical Therapy needs at this time.  Patient discharged from Physical Therapy services.  Please re-order if a new functional limitation presents during this admission.    GOALS  Patient was able to achieve the following goals ...    Patient was able to transfer At previous, functional level   Patient able to ambulate on level surfaces At previous, functional level         HOME SITUATION  Type of Home: House            Stairs to Bedroom: 0       Lives With: Daughter (MED, 2 grandkids)  Drives: No  Patient Owned Equipment: Rolling walker;Rollator  Patient Regularly Uses: Glasses    Prior Level of [place]: Pt typically indep with ADLs and mobility, has a RW and rollator that she will occasionally use. Does have history of L sided weakness from previous CVA.     SUBJECTIVE  \"Are you [place]?\"      OBJECTIVE  Precautions: Bed/chair alarm  Fall Risk: Standard fall risk    WEIGHT BEARING RESTRICTION  Weight Bearing Restriction: None                PAIN ASSESSMENT  Ratin  Location:  knees  Management Techniques: Activity promotion    COGNITION  Problem Solving:  assistance required to implement solutions    RANGE OF MOTION AND STRENGTH ASSESSMENT  Upper extremity ROM and strength are within functional limits     Lower extremity ROM is within functional limits     Lower extremity strength is within functional limits       BALANCE  Static Sitting: Fair +  Dynamic Sitting: Fair +  Static Standing: Fair  Dynamic Standing: Fair -    ADDITIONAL TESTS                                    ACTIVITY TOLERANCE  Pulse: 56  Heart Rate Source: Monitor                   O2 WALK  Oxygen Therapy  SPO2% on Oxygen at Rest: 92  At rest oxygen flow (liters per minute): 3  Ambulation oxygen flow (liters per minute): 3    NEUROLOGICAL FINDINGS  Neurological Findings: Coordination - Heel to Shin;Coordination - Finger to Nose;Coordination - Rapid Alternating Movement;Sensation  Coordination - Finger to Nose: Symmetrical  Coordination - Heel to Shin: Symmetrical  Coordination - Rapid Alternating Movement: Symmetrical  Sensation: intact to light touch BLE/BUE         AM-PAC '6-Clicks' INPATIENT SHORT FORM - BASIC MOBILITY  How much difficulty does the patient currently have...  Patient Difficulty: Turning over in bed (including adjusting bedclothes, sheets and blankets)?: None   Patient Difficulty: Sitting down on and standing up from a chair with arms (e.g., wheelchair, bedside commode, etc.): None   Patient Difficulty: Moving from lying on back to sitting on the side of the bed?: None   How much help from another person does the patient currently need...   Help from Another: Moving to and from a bed to a chair (including a wheelchair)?: A Little   Help from Another: Need to walk in hospital room?: A Little   Help from Another: Climbing 3-5 steps with a railing?: A Little       AM-PAC Score:  Raw Score: 21   Approx Degree of Impairment: 28.97%   Standardized Score (AM-PAC Scale): 50.25   CMS Modifier (G-Code):  CJ    FUNCTIONAL ABILITY STATUS  Gait Assessment   Functional Mobility/Gait Assessment  Gait Assistance: Supervision  Distance (ft): 100  Assistive Device: Rolling walker  Pattern: Within Functional Limits    Skilled Therapy Provided     Gait Training:   Pt cued on upright standing posture to improve alignment with UEs  Pt cued on gait sequencing with RW - Pt reports she tries not to use it since it's \"not good for her\" - encouraged continued use of RW for improved balance and stability  Pt cued on proper UE placement on RW handles  RW height adjusted to improve gait mechanics    Therapeutic Activity:   Pt cued on placement of UE and LEs for optimal force generation and safe STS transfer.   Pt cued on usage of arm rests for controlled descent into sitting      Bed Mobility:  Rolling: NT  Supine to sit: NT   Sit to supine: NT     Transfer Mobility:  Sit to stand: ind   Stand to sit: ind  Gait = ind-supervision    Therapist's comments:RN cleared for session. Pt agreeable for therapy, received supine. Pt ambulated to/from bathroom, able to toilet independently. 3L O2 throughout session. Instructed to call for nursing staff for any needs and OOB mobility.     Exercise/Education Provided:  Bed mobility  Body mechanics  Energy conservation  Functional activity tolerated  Gait training  Posture  Strengthening  Transfer training    Patient End of Session: Up in chair;Needs met;Call light within reach;RN aware of session/findings;All patient questions and concerns addressed;Alarm set;Discussed recommendations with /    Patient Evaluation Complexity Level:  History High - 3 or more personal factors and/or co-morbidities   Examination of body systems Low - addressing 1-2 elements   Clinical Presentation Low - Stable   Clinical Decision Making Low Complexity       PT Session Time: 20 minutes  Gait Trainin minutes  Therapeutic Activity: 4 minutes

## 2024-04-02 NOTE — SLP NOTE
ADULT SWALLOWING EVALUATION    ASSESSMENT    ASSESSMENT/OVERALL IMPRESSION:  Order received for BSE due too patient c/o dysphagia with pills and PO intake. Patient presented from home with increased dizziness. Head CT negative for acute changes. Patient diagnosed with acute severe vertigo.   Patient received awake, alert, and pleasant. Patient on baseline 3L O2 via NC.  Patient reported she lives alone. Endorsed right facial asymmetry 2/2 [place] Palsy \"years ago.\"  Reported decreased dentition and process for dentures has already been started.  Patient reported occasional choking at home, foood spilling out of her mouth, and pill dysphagia.  Reported she has to eat softer foods.  RN present and patient reported taking all her pills at once.  During this evaluation, RN administered 1 pill at a time with thin liquid.  Patient self administered PO trials.   Patient with suspected mild (chronic) oral dysphagia characterized by decreased mastication efficiency and suspected premature spillage.  Improved bolus control with cued slow rate and controlled bolus volume. No overt clinical s/s aspiration observed or suspected within controlled conditions of this evaluation.  Time spent with patient on education and training in recommended swallow strategies.  Patient expressed understanding however recommend SLP followup for reinforcement and assess for diet texture tolerance. D/W RN as welll who expressed understanding.     RECOMMENDATIONS   Diet Recommendations - Solids: Regular (Patient to select softer textures as able to tolerate given decreased dentition)  Diet Recommendations - Liquids: Thin Liquids (No straws)  Compensatory Strategies Recommended: No straws;Slow rate;Small bites and sips  Aspiration Precautions: Upright position;Slow rate;Small bites and sips;No straw  Medication Administration Recommendations: One pill at a time;Whole in puree  Treatment Plan/Recommendations: Aspiration precautions    HISTORY   MEDICAL  HISTORY  Reason for Referral: R/O aspiration;Other (Comment) (patient c/o premorbid dysphagia)    Problem List  Principal Problem:    Acute onset of severe vertigo      Past Medical History  Past Medical History:   Diagnosis Date    A-fib ([facility]) 2017    Abnormal chest CT 1/9/2013    Anxiety     Arrhythmia     A-fib since 2016    ASHD     Asthma ([facility])     Back problem     Bell's palsy     Right side of face still affected    Cancer ([facility])     thyroid    Chronic fatigue     Community acquired pneumonia of left lower lobe of lung 6/5/2019    Resolved last addressed  3/19/18    Complex tear of lateral meniscus of left knee as current injury 11/19/2019    Congestive heart disease ([facility]) 2018    COPD (chronic obstructive pulmonary disease) ([facility])     Nightly and then 02-2L nc prn at home     DDD (degenerative disc disease), lumbar 4/22/2015    Resolved per note 6/25/20     Depression     Diabetes ([facility])     Disorder of thyroid     Esophageal reflux     Extrinsic asthma, unspecified     Fibromyalgia     GERD      Goiter     HIGH BLOOD PRESSURE     HIGH CHOLESTEROL     Hyponatremia 6/5/2019    Resolved per lab review    Impaired fasting glucose     Lumbar stenosis 4/22/2015    Resolved per note 6/25/20     Macroalbuminuric diabetic nephropathy ([facility]) 1/21/2015    Migraines     none in last 20 years    Muscle weakness     left side    Neuropathy     Left leg from knee down to foot    Obesity, unspecified     ASHLEY (obstructive sleep apnea) SPLIT NIGHT 3-29-16    AHI 51 RDI 53 SaO2 ko 79 % BIPAP 16/11 with O2 entrained @ 2 l/min [employer]    Osteoarthritis     Osteoarthritis knees     Peripheral vascular disease ([facility])     varicose veins    Pneumonia, organism unspecified(486)     Problems with swallowing     Due to nodules on thyroid RESOLVED WITH SURGERY    Reflux     Sleep apnea     cpap    Status post thyroidectomy     Stroke ([facility])     Left side weakness     Thyroid ca ([facility]) 6/27/2013    Thyroid cancer ([facility])     localized, s/p  resection    Trigger finger, right ring finger 11/14/2019    Resolved per note 6/25/20    Visual impairment     glasses       Prior Living Situation: Home alone  Diet Prior to Admission: Regular;Thin liquids       Patient/Family Goals: to get better    SWALLOWING HISTORY  Current Diet Consistency: Regular;Thin liquids  Dysphagia History: previous VFSS in 1/2017 with no oropharyngeal dysphagia appreciated.  Imaging Results: Head CT negative      OBJECTIVE   ORAL MOTOR EXAMINATION  Dentition:  (missing several teeth; to go for dentures soon)  Symmetry: Reduced right facial  Strength: Reduced right facial     Range of Motion: Reduced right facial       Voice Quality: Clear  Respiratory Status: Supplemental O2;Nasal cannula (baseline 3L O2 via NC)  Consistencies Trialed: Thin liquids;Puree;Hard solid  Method of Presentation: Self presentation  Patient Positioning: Upright;Midline;Standard chair    Oral Phase of Swallow: Impaired  Bolus Retrieval: Intact  Bilabial Seal: Intact  Bolus Formation: Intact  Bolus Propulsion: Intact  Mastication: Impaired  Retention: Intact    Pharyngeal Phase of Swallow: Within Functional Limits           (Please note: Silent aspiration cannot be evaluated clinically. Videofluoroscopic Swallow Study is required to rule-out silent aspiration.)    Esophageal Phase of Swallow: No complaints consistent with possible esophageal involvement                GOALS  Goal #1 The patient will tolerate regular consistency and thin liquids without overt signs or symptoms of aspiration with 90 % accuracy over 1-2 session(s).  In Progress   Goal #2 The patient will utilize compensatory strategies as outlined by  BSSE (clinical evaluation) including Slow rate, Small bites, Small sips, No straws, Upright 90 degrees with no assistance 80 % of the time across 1-2 sessions.    In Progress     FOLLOW UP  Treatment Plan/Recommendations: Aspiration precautions     Follow Up Needed (Documentation Required): Yes  SLP  Follow-up Date: 04/04/24    Thank you for your referral.   If you have any questions, please contact GIANNA Anderson, MS CANALES-SLP/L, pager [phone]  Speech-Language Pathologist

## 2024-04-02 NOTE — PLAN OF CARE
Assumed care at 0730  Patient alert, oriented x4  VSS, Weaned to 2.5L NC, Converted from aflutter to SB  Pain controlled by scheduled meds  PT eval done  Up SB assist and walker  Voiding in bathroom  Call light within reach    AVS reviewed with patient and family  All questions answered  Belongings and home med sent w patient/family    NURSING DISCHARGE NOTE    Discharged Home via Wheelchair.  Accompanied by Family member and Support staff  Belongings Taken by patient/family.

## 2024-04-02 NOTE — PLAN OF CARE
NURSING ADMISSION NOTE      Patient admitted via Cart  Oriented to room.  Safety precautions initiated.  Bed in low position.  Call light in reach.    Assumed care around 0200.  A/o x4, forgetful. Atrial flutter on tele. 3-4L NC.  Persistent dizziness. PRN Meclizine.   High fall risk.   Up w/ walker and assistance.   Resting in bed w/ call light within reach & bed alarm on.   Plan for PT & SLT.      Problem: Diabetes/Glucose Control  Goal: Glucose maintained within prescribed range  Description: INTERVENTIONS:  - Monitor Blood Glucose as ordered  - Assess for signs and symptoms of hyperglycemia and hypoglycemia  - Administer ordered medications to maintain glucose within target range  - Assess barriers to adequate nutritional intake and initiate nutrition consult as needed  - Instruct patient on self management of diabetes  Outcome: Progressing     Problem: CARDIOVASCULAR - ADULT  Goal: Maintains optimal cardiac output and hemodynamic stability  Description: INTERVENTIONS:  - Monitor vital signs, rhythm, and trends  - Monitor for bleeding, hypotension and signs of decreased cardiac output  - Evaluate effectiveness of vasoactive medications to optimize hemodynamic stability  - Monitor arterial and/or venous puncture sites for bleeding and/or hematoma  - Assess quality of pulses, skin color and temperature  - Assess for signs of decreased coronary artery perfusion - ex. Angina  - Evaluate fluid balance, assess for edema, trend weights  Outcome: Progressing  Goal: Absence of cardiac arrhythmias or at baseline  Description: INTERVENTIONS:  - Continuous cardiac monitoring, monitor vital signs, obtain 12 lead EKG if indicated  - Evaluate effectiveness of antiarrhythmic and heart rate control medications as ordered  - Initiate emergency measures for life threatening arrhythmias  - Monitor electrolytes and administer replacement therapy as ordered  Outcome: Progressing     Problem: RESPIRATORY - ADULT  Goal: Achieves  optimal ventilation and oxygenation  Description: INTERVENTIONS:  - Assess for changes in respiratory status  - Assess for changes in mentation and behavior  - Position to facilitate oxygenation and minimize respiratory effort  - Oxygen supplementation based on oxygen saturation or ABGs  - Provide Smoking Cessation handout, if applicable  - Encourage broncho-pulmonary hygiene including cough, deep breathe, Incentive Spirometry  - Assess the need for suctioning and perform as needed  - Assess and instruct to report SOB or any respiratory difficulty  - Respiratory Therapy support as indicated  - Manage/alleviate anxiety  - Monitor for signs/symptoms of CO2 retention  Outcome: Progressing     Problem: METABOLIC/FLUID AND ELECTROLYTES - ADULT  Goal: Electrolytes maintained within normal limits  Description: INTERVENTIONS:  - Monitor labs and rhythm and assess patient for signs and symptoms of electrolyte imbalances  - Administer electrolyte replacement as ordered  - Monitor response to electrolyte replacements, including rhythm and repeat lab results as appropriate  - Fluid restriction as ordered  - Instruct patient on fluid and nutrition restrictions as appropriate  Outcome: Progressing     Problem: PAIN - ADULT  Goal: Verbalizes/displays adequate comfort level or patient's stated pain goal  Description: INTERVENTIONS:  - Encourage pt to monitor pain and request assistance  - Assess pain using appropriate pain scale  - Administer analgesics based on type and severity of pain and evaluate response  - Implement non-pharmacological measures as appropriate and evaluate response  - Consider cultural and social influences on pain and pain management  - Manage/alleviate anxiety  - Utilize distraction and/or relaxation techniques  - Monitor for opioid side effects  - Notify MD/LIP if interventions unsuccessful or patient reports new pain  - Anticipate increased pain with activity and pre-medicate as appropriate  Outcome:  Progressing     Problem: SAFETY ADULT - FALL  Goal: Free from fall injury  Description: INTERVENTIONS:  - Assess pt frequently for physical needs  - Identify cognitive and physical deficits and behaviors that affect risk of falls.  - [place] fall precautions as indicated by assessment.  - Educate pt/family on patient safety including physical limitations  - Instruct pt to call for assistance with activity based on assessment  - Modify environment to reduce risk of injury  - Provide assistive devices as appropriate  - Consider OT/PT consult to assist with strengthening/mobility  - Encourage toileting schedule  Outcome: Progressing

## 2024-04-02 NOTE — ED QUICK NOTES
Orders for admission, patient is aware of plan and ready to go upstairs. Any questions, please call ED RN angelito at [facility] nurses station [phone].     Patient Covid vaccination status: Fully vaccinated     COVID Test Ordered in ED: None    COVID Suspicion at Admission: N/A    Running Infusions:  None    Mental Status/LOC at time of transport: a&ox3. Moving all extremities. Walks with walking daniels. C/o dizziness.    Other pertinent information:   CIWA score: N/A   NIH score:  N/A

## 2024-04-02 NOTE — H&P
[facility]   part of [facility]    History and Physical     Radha Núñez Patient Status:  Observation    3/7/1950 MRN [id number]   Location [facility] 7NE-A Attending Gurjit Valdez MD   Hosp Day # 0 PCP Prakash Obando MD     Chief Complaint:   Chief Complaint   Patient presents with    Dizziness       History of Present Illness: Radha Núñez is a 74 year old female with AF, CHF, COPD w/ chronic respiratory failure on home O2, DM2, hx stroke with left hemiplegia who presented to the ED for evaluation of 22 days of dizziness (room spinning) with associated nausea.     On arrival she was afebrile and hemodynamically stable.  Labs were unremarkable from baseline.  EKG/troponin were unremarkable.  CT head was negative.  She was given meclizine without relief so was admitted.      Overnight there were no acute events and she remained afebrile and hemodynamically stable. Her symptoms have resolved and this morning she feels back to baseline.     Past Medical History:  Past Medical History:   Diagnosis Date    A-fib ([facility])     Abnormal chest CT 2013    Anxiety     Arrhythmia     A-fib since 2016    ASHD     Asthma ([facility])     Back problem     Bell's palsy     Right side of face still affected    Cancer ([facility])     thyroid    Chronic fatigue     Community acquired pneumonia of left lower lobe of lung 2019    Resolved last addressed  3/19/18    Complex tear of lateral meniscus of left knee as current injury 2019    Congestive heart disease ([facility]) 2018    COPD (chronic obstructive pulmonary disease) ([facility])     Nightly and then 02-2L nc prn at home     DDD (degenerative disc disease), lumbar 2015    Resolved per note 20     Depression     Diabetes ([facility])     Disorder of thyroid     Esophageal reflux     Extrinsic asthma, unspecified     Fibromyalgia     GERD      Goiter     HIGH BLOOD PRESSURE     HIGH CHOLESTEROL     Hyponatremia 2019    Resolved per lab review     Impaired fasting glucose     Lumbar stenosis 2015    Resolved per note 20     Macroalbuminuric diabetic nephropathy (HCC) 2015    Migraines     none in last 20 years    Muscle weakness     left side    Neuropathy     Left leg from knee down to foot    Obesity, unspecified     ASHLEY (obstructive sleep apnea) SPLIT NIGHT 3-29-16    AHI 51 RDI 53 SaO2 ko 79 % BIPAP  with O2 entrained @ 2 l/min [employer]    Osteoarthritis     Osteoarthritis knees     Peripheral vascular disease (HCC)     varicose veins    Pneumonia, organism unspecified(486)     Problems with swallowing     Due to nodules on thyroid RESOLVED WITH SURGERY    Reflux     Sleep apnea     cpap    Status post thyroidectomy     Stroke (HCC)     Left side weakness     Thyroid ca (HCC) 2013    Thyroid cancer (HCC)     localized, s/p resection    Trigger finger, right ring finger 2019    Resolved per note 20    Visual impairment     glasses        Past Surgical History:   Past Surgical History:   Procedure Laterality Date    APPENDECTOMY      BENIGN BIOPSY LEFT  ?    BIOPSY OF BREAST, INCISIONAL      multiple, all benign    BIOPSY OF BREAST, INCISIONAL  2007    findings: benign; by Dr Yoana Morelos in [place]          COLONOSCOPY N/A 2022    Procedure: COLONOSCOPY with hot snare polypectomy;  Surgeon: Lan Ramirez MD;  Location:  ENDOSCOPY    COLONOSCOPY,BIOPSY  2009    findings: mild non-specific erythema asc colon; bx results unknown; by Dr Dilan Oneal in [place]    DISK SURG,ANTER,CERVICAL,SINGLE LVL  abt     C5-C7 fusion    FLUOR GID & LOCLZJ NDL/CATH SPI DX/THER NJX N/A 2015    Procedure: LUMBAR EPIDURAL;  Surgeon: Jose Adam MD;  Location: [facility]    FLUOR GID & LOCLZJ NDL/CATH SPI DX/THER NJX  2015    Procedure: ;  Surgeon: Jose Adam MD;  Location: [facility]    HERNIA SURGERY  2018    ventral hernia repair, complex     HYSTERECTOMY      for fibroids; complicated by infection/dehiscence, wound was open for healing    INJECTION, W/WO CONTRAST, DX/THERAPEUTIC SUBSTANCE, EPIDURAL/SUBARACHNOID; LUMBAR/SACRAL N/A 8/6/2014    Procedure: CAUDAL EPIDURAL STEROID INJECTION;  Surgeon: Leora Dowell MD;  Location: [facility]    INJECTION, W/WO CONTRAST, DX/THERAPEUTIC SUBSTANCE, EPIDURAL/SUBARACHNOID; LUMBAR/SACRAL N/A 4/1/2015    Procedure: LUMBAR EPIDURAL;  Surgeon: Jose Adam MD;  Location: [facility]    INJECTION, W/WO CONTRAST, DX/THERAPEUTIC SUBSTANCE, EPIDURAL/SUBARACHNOID; LUMBAR/SACRAL N/A 4/22/2015    Procedure: LUMBAR EPIDURAL;  Surgeon: Jose Adam MD;  Location: [facility]    LARYNGOSCOPY,DIRCT,INJ VOCAL CORD  abt 1986    vocal cord polyps    M-SEDAJ BY  PHYS PER[facility] SV 5+ YR N/A 4/1/2015    Procedure: LUMBAR EPIDURAL;  Surgeon: Jose Adam MD;  Location: [facility]    M-SEDAJ BY [facility] 5+ YR  4/22/2015    Procedure: ;  Surgeon: Jose Adam MD;  Location: [facility]    NEEDLE BIOPSY LEFT  ?    NEEDLE BIOPSY RIGHT  ?    OTHER  2008    removed benign tumor lt    PATIENT DOCUMENTED NOT TO HAVE EXPERIENCED ANY OF THE FOLLOWING EVENTS  4/22/2015    Procedure: ;  Surgeon: Jose Adam MD;  Location: [facility]    PATIENT WITHOUGH PREOPERATIVE ORDER FOR IV ANTIBIOTIC SURGICAL SITE INFECTION PROPHYLAXIS.  4/22/2015    Procedure: ;  Surgeon: Jose Adam MD;  Location: [facility]    REMOVAL OF LUNG,LOBECTOMY  abt 1999    VATS right; told has a pseudotumor    REMOVAL OF LUNG,LOBECTOMY  abt 2000    VATS left; possibly sarcoid - bx with vasculitis 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    REPAIR UMBILICAL YANETH,5+Y/O,REDUC  7/9/2007    by Dr Yoana Morelos in [place]    SPINE SURGERY PROCEDURE UNLISTED      C 5-6-7    THYROID LOBECTOMY,UNILAT  abt 1982    left hemithyroidectomy; in  [place]    TONSILLECTOMY      UPPER GI ENDOSCOPY,BIOPSY  8/11/2009    findings: hiatal hernia, mild gastritis; bx results unknown; by Dr Dilan Oneal       Social History:  reports that she quit smoking about 25 years ago. Her smoking use included cigarettes. She started smoking about 63 years ago. She has a 150 pack-year smoking history. She has never used smokeless tobacco. She reports that she does not drink alcohol and does not use drugs.    Family History:   Family History   Problem Relation Age of Onset    Other (Other) Father         Kidney problems    Other (Other[other]) Mother         Sarcoid    Hypertension Mother     Lipids Mother     Obesity Mother     Psychiatric Mother     Cancer Maternal Grandmother         stomach    Cancer Maternal Grandfather         pancreatic       Allergies:   Allergies   Allergen Reactions    Ace Inhibitors ANGIOEDEMA and SWELLING    Codeine HIVES    Iodine (Topical) HIVES, OTHER (SEE COMMENTS) and RASH     KIDNEY FAILURE  Kidney failure      Meloxicam HIVES    Morphine HIVES and RASH     ABLE TO TOLERATE DILAUDID    Radiology Contrast Iodinated Dyes ANAPHYLAXIS     KIDNEY FAILURE    Adhesive Tape OTHER (SEE COMMENTS)     RED SKIN AND BLISTERS  Blister and red skin    Compazine HALLUCINATION    Lipitor [Atorvastatin Calcium] PAIN     LEG PAIN    Prochlorperazine OTHER (SEE COMMENTS) and NAUSEA AND VOMITING     halucinations  hallucination      Atorvastatin OTHER (SEE COMMENTS)     pain    Cephalosporins OTHER (SEE COMMENTS)     Doesn't remember    Hydrocodone NAUSEA AND VOMITING    Latex RASH    Penicillins RASH     ^^^Per [facility] received cefTRIAXone (ROCEPHIN) injection 1,000 mg most recently on 02/28/2020 after allergy date of 02/23/2020^^^       Medications:    No current facility-administered medications on file prior to encounter.     Current Outpatient Medications on File Prior to Encounter   Medication Sig Dispense Refill    Suvorexant (BELSOMRA) 15 MG Oral Tab Take  15 mg by mouth at bedtime.      ibuprofen 100 MG Oral Tab Take 1 tablet (100 mg total) by mouth every 6 (six) hours as needed for Fever.      fluticasone-umeclidin-vilant 100-62.5-25 MCG/ACT Inhalation Aerosol Powder, Breath Activated Inhale 1 puff into the lungs daily. 1 each 1    azithromycin 250 MG Oral Tab Take 1 tablet (250 mg total) by mouth 3 (three) times a week. 36 tablet 3    docusate sodium 100 MG Oral Tab Take 1 tablet (100 mg total) by mouth 2 (two) times daily. 180 tablet 3    traMADol HCl 100 MG Oral Tab Take 2 tablets by mouth nightly. 60 tablet 0    DULoxetine 30 MG Oral Cap DR Particles Take 2 capsules (60 mg total) by mouth every morning. 60 capsule 2    ALENDRONATE 70 MG Oral Tab TAKE 1 TABLET(70 MG) BY MOUTH 1 TIME A WEEK. PATIENT TAKE EVERY MONDAY 12 tablet 1    traZODone 100 MG Oral Tab Take 3 tablets by mouth nightly 270 tablet 0    ELIQUIS 5 MG Oral Tab TAKE 1 TABLET(5 MG) BY MOUTH TWICE DAILY 180 tablet 3    omeprazole 20 MG Oral Capsule Delayed Release Take 1 capsule (20 mg total) by mouth 2 (two) times daily before meals. 180 capsule 3    Blood Glucose Monitoring Suppl (ACCU-CHEK GUIDE) w/Device Does not apply Kit Use to check blood sugar three times daily. Dx: E11.9 1 kit 0    Accu-Chek Softclix Lancets Does not apply Misc Use to check blood sugar three times daily. Dx: E11.9 300 each 3    furosemide 40 MG Oral Tab Take 2 tablets twice daily (Patient taking differently: Take 1 tablet (40 mg total) by mouth daily. Take 2 tablets twice daily) 360 tablet 3    dilTIAZem HCl ER Coated Beads 300 MG Oral Capsule SR 24 Hr Take 1 capsule (300 mg total) by mouth daily. 90 capsule 3    Metoprolol Succinate ER 25 MG Oral Tablet 24 Hr Take 1 tablet (25 mg total) by mouth daily. 90 tablet 3    acetaminophen 500 MG Oral Tab Take 1 tablet (500 mg total) by mouth every 6 (six) hours as needed for Pain.      albuterol 108 (90 Base) MCG/ACT Inhalation Aero Soln Inhale 2 puffs into the lungs every 4 (four)  hours as needed for Wheezing. 1 Inhaler 6    Multiple Vitamins-Minerals (MULTIVITAMIN OR) Take 1 tablet by mouth daily.      Fluticasone Propionate 50 MCG/ACT Nasal Suspension 2 sprays by Nasal route daily. 1 Bottle 3    gabapentin 300 MG Oral Cap Take 2 capsules (600 mg total) by mouth at bedtime.      Tirzepatide (MOUNJARO) 5 MG/0.5ML Subcutaneous Solution Pen-injector Inject 5 mg into the skin once a week.      zolpidem 10 MG Oral Tab Take 1 tablet (10 mg total) by mouth nightly as needed.      QUEtiapine 300 MG Oral Tab Take 1.5 tablets (450 mg total) by mouth nightly. 135 tablet 0    Glucose Blood (ACCU-CHEK GUIDE) In Vitro Strip Use to check blood sugar three times daily. Dx: E11.9 300 strip 3    ROSUVASTATIN CALCIUM 40 MG Oral Tab TAKE 1 TABLET(40 MG) BY MOUTH DAILY 90 tablet 3    aspirin 81 MG Oral Tab EC Take 1 tablet (81 mg total) by mouth daily.         Review of Systems:   A comprehensive 14 point review of systems was completed.    Pertinent positives and negatives noted in the HPI.    Physical Exam:    /61 (BP Location: Left arm)   Pulse 73   Temp 98.4 °F (36.9 °C) (Oral)   Resp 15   Ht 5' 2\" (1.575 m)   Wt 245 lb (111.1 kg)   SpO2 95%   BMI 44.81 kg/m²     General: No acute distress. Comfortable, nontoxic   HEENT: Normocephalic atraumatic. Moist mucous membranes; Sclera anicteric.  Neck: Supple, no JVD  Respiratory: Clear to auscultation bilaterally. Reg resp rate & effort, no wheezes/crackles   Cardiovascular: S1, S2. IRIR, rate controlled. No murmurs appreciable   Chest and Back: No tenderness or deformity.  Abdomen: Soft, nontender, nondistended.  Positive bowel sounds. No rebound, guarding or organomegaly.  Neurologic: No focal neurological deficits. CNII-XII grossly intact.  Musculoskeletal: Moves all extremities.  Extremities: No edema or cyanosis.  Integument: No rashes or lesions.   Psychiatric: Appropriate mood and affect.      Diagnostic Data:      Labs:  Recent Labs   Lab  04/01/24 2033   WBC 11.2*   HGB 10.3*   MCV 74.2*   .0       Recent Labs   Lab 04/01/24 2033   GLU 81   BUN 16   CREATSERUM 1.04*   CA 10.5*   ALB 3.3*      K 3.5   CL 97*   CO2 37.0*   ALKPHO 83   AST 16   ALT 15   BILT 0.7   TP 8.0       Estimated Creatinine Clearance: 37.5 mL/min (A) (based on SCr of 1.04 mg/dL (H)).    No results for input(s): \"PTP\", \"INR\" in the last 168 hours.    No results for input(s): \"TROP\", \"CK\" in the last 168 hours.    Imaging: Imaging data reviewed in Epic.      ASSESSMENT / PLAN:     Radha Núñez Is a a 74 year old female who presented with dizziness and associated nausea, likely vertigo    Problem List / Diagnoses    Dizziness -- resolved  Likely BPPV  COPD  Chronic Respiratory Failure with Hypoxia  HTN  HL  Paroxysmal Afib  DM2  CKD3    Plan    Dizziness -- resolved  Likely BPPV  -- positional dizziness described as spinning sensation c/w BPPV; now resolved  -- continue meclizine prn   -- PT/OT consulted  -- will provide rx for outpatient vestibular tx  -- antiemetics prn     COPD  Chronic Respiratory Failure with Hypoxia  -- stable on baseline O2    HTN  HL  -- stable, resume home metoprolol, crestor     Paroxysmal Afib  -- resume home eliqiuis, diltiazem,metoprolol      DM2  -- hold oral meds while admitted  -- accuchecks, SSI-low    CKD3  -- stable at baseline, monitor  -- renally dose all meds, avoid nephrotoxic agents     DVT Mechanical Prophylaxis:   SCDs,    DVT Pharmacologic Prophylaxis   Medication    apixaban (Eliquis) tab 5 mg              Code Status: Full Code     Dispo: observation; VINCENT today pending PT eval     Plan of care discussed with patient and/or family at bedside.    JOHN Skinner MD  [facility]   [phone]

## 2024-04-02 NOTE — PROGRESS NOTES
Orthostatics (Couldn't tolerate standing set)   04/02/24 0215 04/02/24 0220   Vital Signs   /71 126/71   MAP (mmHg) 86 88   BP Location Left arm Left arm   BP Method Automatic Automatic   Patient Position Lying Sitting

## 2024-04-02 NOTE — ED PROVIDER NOTES
Patient Seen in: [facility] Emergency Department      History     Chief Complaint   Patient presents with    Dizziness     Stated Complaint: dizziness, hx of vertigo    Subjective:   HPI    74-year-old female presenting emerged part for dizziness.  Patient states she has had 48 hours of dizziness came on yesterday morning when she started noticing the room spinning sensation extremely nauseous.  Patient denies chest pain palpitations any sort of abdominal pain and diarrhea black or bloody bowel movements or any other complaints.    Objective:   Past Medical History:   Diagnosis Date    A-fib ([facility]) 2017    Abnormal chest CT 1/9/2013    Anxiety     Arrhythmia     A-fib since 2016    ASHD     Asthma ([facility])     Back problem     Bell's palsy     Right side of face still affected    Cancer ([facility])     thyroid    Chronic fatigue     Community acquired pneumonia of left lower lobe of lung 6/5/2019    Resolved last addressed  3/19/18    Complex tear of lateral meniscus of left knee as current injury 11/19/2019    Congestive heart disease ([facility]) 2018    COPD (chronic obstructive pulmonary disease) ([facility])     Nightly and then 02-2L nc prn at home     DDD (degenerative disc disease), lumbar 4/22/2015    Resolved per note 6/25/20     Depression     Diabetes ([facility])     Disorder of thyroid     Esophageal reflux     Extrinsic asthma, unspecified     Fibromyalgia     GERD      Goiter     HIGH BLOOD PRESSURE     HIGH CHOLESTEROL     Hyponatremia 6/5/2019    Resolved per lab review    Impaired fasting glucose     Lumbar stenosis 4/22/2015    Resolved per note 6/25/20     Macroalbuminuric diabetic nephropathy ([facility]) 1/21/2015    Migraines     none in last 20 years    Muscle weakness     left side    Neuropathy     Left leg from knee down to foot    Obesity, unspecified     ASHLEY (obstructive sleep apnea) SPLIT NIGHT 3-29-16    AHI 51 RDI 53 SaO2 ko 79 % BIPAP 16/11 with O2 entrained @ 2 l/min [employer]    Osteoarthritis     Osteoarthritis  knees     Peripheral vascular disease (HCC)     varicose veins    Pneumonia, organism unspecified(486)     Problems with swallowing     Due to nodules on thyroid RESOLVED WITH SURGERY    Reflux     Sleep apnea     cpap    Status post thyroidectomy     Stroke (HCC)     Left side weakness     Thyroid ca (HCC) 2013    Thyroid cancer (HCC)     localized, s/p resection    Trigger finger, right ring finger 2019    Resolved per note 20    Visual impairment     glasses              Past Surgical History:   Procedure Laterality Date    APPENDECTOMY      BENIGN BIOPSY LEFT  ?    BIOPSY OF BREAST, INCISIONAL      multiple, all benign    BIOPSY OF BREAST, INCISIONAL  2007    findings: benign; by Dr Yoana Morelos in [place]          COLONOSCOPY N/A 2022    Procedure: COLONOSCOPY with hot snare polypectomy;  Surgeon: Lan Ramirez MD;  Location:  ENDOSCOPY    COLONOSCOPY,BIOPSY  2009    findings: mild non-specific erythema asc colon; bx results unknown; by Dr Dilan Oneal in [place]    DISK SURG,ANTER,CERVICAL,SINGLE LVL  abt     C5-C7 fusion    FLUOR GID & LOCLZJ NDL/CATH SPI DX/THER NJX N/A 2015    Procedure: LUMBAR EPIDURAL;  Surgeon: Jose Adam MD;  Location: [facility]    FLUOR GID & LOCLZJ NDL/CATH SPI DX/THER NJX  2015    Procedure: ;  Surgeon: Jose Adam MD;  Location: [facility]    HERNIA SURGERY  2018    ventral hernia repair, complex    HYSTERECTOMY      for fibroids; complicated by infection/dehiscence, wound was open for healing    INJECTION, W/WO CONTRAST, DX/THERAPEUTIC SUBSTANCE, EPIDURAL/SUBARACHNOID; LUMBAR/SACRAL N/A 2014    Procedure: CAUDAL EPIDURAL STEROID INJECTION;  Surgeon: Leora Dowell MD;  Location: [facility]    INJECTION, W/WO CONTRAST, DX/THERAPEUTIC SUBSTANCE, EPIDURAL/SUBARACHNOID; LUMBAR/SACRAL N/A 2015    Procedure: LUMBAR EPIDURAL;  Surgeon: Jose Adam MD;   Location: [facility]    INJECTION, W/WO CONTRAST, DX/THERAPEUTIC SUBSTANCE, EPIDURAL/SUBARACHNOID; LUMBAR/SACRAL N/A 4/22/2015    Procedure: LUMBAR EPIDURAL;  Surgeon: Jose Adam MD;  Location: [facility]    LARYNGOSCOPY,DIRCT,INJ VOCAL CORD  abt 1986    vocal cord polyps    M-SEDAJ BY  PHYS [facility] SVC 5+ YR N/A 4/1/2015    Procedure: LUMBAR EPIDURAL;  Surgeon: Jose Adam MD;  Location: [facility]    M-SEDAJ BY  PHYS [facility] SV 5+ YR  4/22/2015    Procedure: ;  Surgeon: Jose Adam MD;  Location: [facility]    NEEDLE BIOPSY LEFT  ?    NEEDLE BIOPSY RIGHT  ?    OTHER  2008    removed benign tumor lt    PATIENT DOCUMENTED NOT TO HAVE EXPERIENCED ANY OF THE FOLLOWING EVENTS  4/22/2015    Procedure: ;  Surgeon: Jose Adam MD;  Location: [facility]    PATIENT WITHOUGH PREOPERATIVE ORDER FOR IV ANTIBIOTIC SURGICAL SITE INFECTION PROPHYLAXIS.  4/22/2015    Procedure: ;  Surgeon: Jose Aadm MD;  Location: [facility]    REMOVAL OF LUNG,LOBECTOMY  abt 1999    VATS right; told has a pseudotumor    REMOVAL OF LUNG,LOBECTOMY  abt 2000    VATS left; possibly sarcoid - bx with vasculitis 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    REPAIR UMBILICAL YANETH,5+Y/O,REDUC  7/9/2007    by Dr Yoana Morelos in [place]    SPINE SURGERY PROCEDURE UNLISTED      C 5-6-7    THYROID LOBECTOMY,UNILAT  abt 1982    left hemithyroidectomy; in [place]    TONSILLECTOMY      UPPER GI ENDOSCOPY,BIOPSY  8/11/2009    findings: hiatal hernia, mild gastritis; bx results unknown; by Dr Dilan Oneal                Social History     Socioeconomic History    Marital status:     Number of children: 2   Tobacco Use    Smoking status: Former     Packs/day: 3.00     Years: 50.00     Additional pack years: 0.00     Total pack years: 150.00     Types: Cigarettes     Start date: 3/7/1961     Quit date: 1/1/1999     Years  since quittin.2    Smokeless tobacco: Never   Vaping Use    Vaping Use: Never used   Substance and Sexual Activity    Alcohol use: No    Drug use: No              Review of Systems    Positive for stated complaint: dizziness, hx of vertigo  Other systems are as noted in HPI.  Constitutional and vital signs reviewed.      All other systems reviewed and negative except as noted above.    Physical Exam     ED Triage Vitals [24]   /62   Pulse 60   Resp 20   Temp 98.9 °F (37.2 °C)   Temp src    SpO2 100 %   O2 Device None (Room air)       Current:/85   Pulse 68   Temp 98.9 °F (37.2 °C)   Resp 15   Ht 157.5 cm (5' 2\")   Wt 111.1 kg   SpO2 99%   BMI 44.81 kg/m²         Physical Exam    Awake alert patient appears no distress HEENT exam is normal lungs are clear cardiovascular exam regular rhythm abdomen soft nontender extremities no COVID cyanosis or edema no rash back exam is normal skin is nondiaphoretic no focal neurologic deficit    ED Course     Labs Reviewed   COMP METABOLIC PANEL (14) - Abnormal; Notable for the following components:       Result Value    Chloride 97 (*)     CO2 37.0 (*)     Creatinine 1.04 (*)     Calcium, Total 10.5 (*)     eGFR-Cr 56 (*)     Albumin 3.3 (*)     Globulin  4.7 (*)     A/G Ratio 0.7 (*)     All other components within normal limits   RBC MORPHOLOGY SCAN - Abnormal; Notable for the following components:    RBC Morphology See morphology below (*)     All other components within normal limits   CBC W/ DIFFERENTIAL - Abnormal; Notable for the following components:    WBC 11.2 (*)     HGB 10.3 (*)     MCV 74.2 (*)     MCH 21.3 (*)     MCHC 28.7 (*)     Neutrophil Absolute Prelim 8.38 (*)     Neutrophil Absolute 8.38 (*)     Monocyte Absolute 1.45 (*)     All other components within normal limits   CBC WITH DIFFERENTIAL WITH PLATELET    Narrative:     The following orders were created for panel order CBC With Differential With Platelet.  Procedure                                Abnormality         Status                     ---------                               -----------         ------                     CBC W/ DIFFERENTIAL[[id number]]          Abnormal            Final result                 Please view results for these tests on the individual orders.     EKG    Rate, intervals and axes as noted on EKG Report.  Rate: 71  Rhythm: Sinus Rhythm  Reading: No areas of acute ST segment elevation or depression                 Differential diagnosis includes vertigo, acute CVA         MDM                                         Medical Decision Making  74-year-old female presenting with dizziness room spinning sensation.  IV established cardiac monitor shows sinus rhythm pulse ox shows no signs of hypoxia.  CBC shows a stable hemoglobin level metabolic panel shows stable renal function troponin shows no elevation indicative of NSTEMI.  Independent interpretation by ED physician head CT shows no subarachnoid hemorrhage.  Patient continues to remain very dizzy here in the emergency department  70 she will be admitted for further evaluation    Problems Addressed:  Acute onset of severe vertigo: acute illness or injury    Amount and/or Complexity of Data Reviewed  Labs: ordered. Decision-making details documented in ED Course.  Radiology: ordered and independent interpretation performed. Decision-making details documented in ED Course.  ECG/medicine tests: ordered and independent interpretation performed. Decision-making details documented in ED Course.        Disposition and Plan     Clinical Impression:  1. Acute onset of severe vertigo         Disposition:  There is no disposition on file for this visit.  There is no disposition time on file for this visit.    Follow-up:  No follow-up provider specified.        Medications Prescribed:  Current Discharge Medication List

## 2024-04-03 LAB
ATRIAL RATE: 284 BPM
ATRIAL RATE: 71 BPM
P AXIS: 69 DEGREES
P AXIS: 83 DEGREES
P-R INTERVAL: 148 MS
Q-T INTERVAL: 396 MS
Q-T INTERVAL: 432 MS
QRS DURATION: 104 MS
QRS DURATION: 96 MS
QTC CALCULATION (BEZET): 430 MS
QTC CALCULATION (BEZET): 469 MS
R AXIS: -40 DEGREES
R AXIS: -42 DEGREES
T AXIS: -44 DEGREES
T AXIS: -8 DEGREES
VENTRICULAR RATE: 71 BPM
VENTRICULAR RATE: 71 BPM

## (undated) DEVICE — STOCKINETTE HYDROMED 4\" 704066

## (undated) DEVICE — DERMABOND LIQUID ADHESIVE

## (undated) DEVICE — SUTURE VICRYL 3-0

## (undated) DEVICE — CLIP LGT 11MM OPEN 2.8MM 235CM

## (undated) DEVICE — Device

## (undated) DEVICE — SUTURE VLOC 90 2-0 9\" 2145

## (undated) DEVICE — 40580 - THE PINK PAD - ADVANCED TRENDELENBURG POSITIONING KIT: Brand: 40580 - THE PINK PAD - ADVANCED TRENDELENBURG POSITIONING KIT

## (undated) DEVICE — DISPOSABLE BIPOLAR FORCEPS 4" (10.2CM) JEWELERS, STRAIGHT 0.4MM TIP AND 12 FT. (3.6M) CABLE: Brand: KIRWAN

## (undated) DEVICE — LIGASURE IMPACT OPEN DEVICE

## (undated) DEVICE — Device: Brand: DEFENDO AIR/WATER/SUCTION AND BIOPSY VALVE

## (undated) DEVICE — SOL  .9 1000ML BTL

## (undated) DEVICE — SUTURE PDS II 1 ST-21

## (undated) DEVICE — CANNULA SEAL

## (undated) DEVICE — SUTURE VLOC 180 0 12\" 0316

## (undated) DEVICE — STERILE POLYISOPRENE POWDER-FREE SURGICAL GLOVES: Brand: PROTEXIS

## (undated) DEVICE — VISUALIZATION SYSTEM: Brand: CLEARIFY

## (undated) DEVICE — GENERAL LAPAROS CDS-LF: Brand: MEDLINE INDUSTRIES, INC.

## (undated) DEVICE — UPPER EXTREMITY CDS-LF: Brand: MEDLINE INDUSTRIES, INC.

## (undated) DEVICE — TROCAR: Brand: KII FIOS FIRST ENTRY

## (undated) DEVICE — 3M(TM) MICROPORE TAPE DISPENSER 1535-2: Brand: 3M™ MICROPORE™

## (undated) DEVICE — SUTURE ETHILON 4-0 P-3

## (undated) DEVICE — PLUMEPORT ACTIV LAPAROSCOPIC SMOKE FILTRATION DEVICE: Brand: PLUMEPORT ACTIVE

## (undated) DEVICE — INSUFFLATION NEEDLE TO ESTABLISH PNEUMOPERITONEUM.: Brand: INSUFFLATION NEEDLE

## (undated) DEVICE — NON-ADHERENT STRIPS,OIL EMULSION: Brand: CURITY

## (undated) DEVICE — GAUZE SPONGES,USP TYPE VII GAUZE, 12 PLY: Brand: CURITY

## (undated) DEVICE — CHLORAPREP 26ML APPLICATOR

## (undated) DEVICE — FILTERLINE NASAL ADULT O2/CO2

## (undated) DEVICE — ENDOSCOPY PACK - LOWER: Brand: MEDLINE INDUSTRIES, INC.

## (undated) DEVICE — VIOLET BRAIDED (POLYGLACTIN 910), SYNTHETIC ABSORBABLE SUTURE: Brand: COATED VICRYL

## (undated) DEVICE — SUTURE VICRYL 2-0

## (undated) DEVICE — PROGRASP FORCEPS: Brand: ENDOWRIST

## (undated) DEVICE — SYRINGE 20CC LL TIP

## (undated) DEVICE — GOWN,SIRUS,FABRIC-REINFORCED,X-LARGE: Brand: MEDLINE

## (undated) DEVICE — BLADELESS OBTURATOR: Brand: WECK VISTA

## (undated) DEVICE — MEGA SUTURECUT ND: Brand: ENDOWRIST

## (undated) DEVICE — GAMMEX® PI HYBRID SIZE 7, STERILE POWDER-FREE SURGICAL GLOVE, POLYISOPRENE AND NEOPRENE BLEND: Brand: GAMMEX

## (undated) DEVICE — MEDI-VAC NON-CONDUCTIVE SUCTION TUBING: Brand: CARDINAL HEALTH

## (undated) DEVICE — GAMMEX® PI HYBRID SIZE 7.5, STERILE POWDER-FREE SURGICAL GLOVE, POLYISOPRENE AND NEOPRENE BLEND: Brand: GAMMEX

## (undated) DEVICE — 1200CC GUARDIAN II: Brand: GUARDIAN

## (undated) DEVICE — LIGHT HANDLE

## (undated) DEVICE — BLADELESS OBTURATOR, LONG: Brand: WECK VISTA

## (undated) DEVICE — KENDALL SCD EXPRESS SLEEVES, KNEE LENGTH, MEDIUM: Brand: KENDALL SCD

## (undated) DEVICE — 3M™ RED DOT™ MONITORING ELECTRODE WITH FOAM TAPE AND STICKY GEL, 50/BAG, 20/CASE, 72/PLT 2570: Brand: RED DOT™

## (undated) DEVICE — SUTURE VICRYL 0

## (undated) DEVICE — TRAP SPEC REMOVAL ETRAP 15CM

## (undated) DEVICE — PROXIMATE SKIN STAPLERS (35 WIDE) CONTAINS 35 STAINLESS STEEL STAPLES (FIXED HEAD): Brand: PROXIMATE

## (undated) DEVICE — SNARE CAPTIFLEX STD OVAL OLY

## (undated) DEVICE — UNDYED BRAIDED (POLYGLACTIN 910), SYNTHETIC ABSORBABLE SUTURE: Brand: COATED VICRYL

## (undated) DEVICE — REM POLYHESIVE ADULT PATIENT RETURN ELECTRODE: Brand: VALLEYLAB

## (undated) DEVICE — SUTURE VICRYL 2-0 CT-1

## (undated) DEVICE — SUTURE VICRYL 2-0 SH

## (undated) DEVICE — OINTMENT BACITRACIN PKT

## (undated) DEVICE — COLUMN DRAPE

## (undated) DEVICE — LAPAROTOMY CDS: Brand: MEDLINE INDUSTRIES, INC.

## (undated) DEVICE — STANDARD HYPODERMIC NEEDLE,POLYPROPYLENE HUB: Brand: MONOJECT

## (undated) DEVICE — ARM DRAPE

## (undated) DEVICE — ABDOMINAL PAD: Brand: DERMACEA

## (undated) DEVICE — PAD SACRAL SPAN AID

## (undated) DEVICE — COVER,MAYO STAND,STERILE: Brand: MEDLINE

## (undated) DEVICE — TIP COVER ACCESSORY

## (undated) DEVICE — MONOPOLAR CURVED SCISSORS: Brand: ENDOWRIST

## (undated) DEVICE — ZIMMER® STERILE DISPOSABLE TOURNIQUET CUFF WITH PLC, DUAL PORT, SINGLE BLADDER, 18 IN. (46 CM)

## (undated) NOTE — LETTER
[facility] Testing Department  Phone: [phone]  OUTSIDE TESTING RESULT REQUEST      TO:   DR Huey Aase      Today's Date: 4/5/18    FAX #: [phone]     IMPORTANT: FOR YOUR IMMEDIATE ATTENTION  Please FAX all test results listed below to: 6

## (undated) NOTE — LETTER
[facility] Testing Department  Phone: [phone]  OUTSIDE TESTING RESULT REQUEST      TO:   Dr. Malena Rosas Date: 10/22/19    FAX #:  [phone]     IMPORTANT: FOR YOUR IMMEDIATE ATTENTION  Please FAX all test results listed below to: 6

## (undated) NOTE — LETTER
1/21/2022  Nieves Núñez  [address] [place] [id number]    Dear Mandie Doing,       Here are the  biopsy/pathology findings from your recent Colonoscopy :    a hyperplastic polyp(s) which is a benign non-cancerous growth that was removed.

## (undated) NOTE — LETTER
[facility] Testing Department  Phone: [phone]  OUTSIDE TESTING RESULT REQUEST      TO:   Dr. Demarco Orantes.  Kaylyn Today's Date: 3/19/21    FAX #: [phone]     IMPORTANT: FOR YOUR IMMEDIATE ATTENTION  Please FAX all test results listed below

## (undated) NOTE — IP AVS SNAPSHOT
[address]            (For Outpatient Use Only) Initial Admit Date: 4/12/2021   Inpt/Obs Admit Date: Inpt: 4/12/21 / Obs: N/A   Discharge Date:    Monty Marroquin:  [de-identified]   MRN: [de-identified]   CSN: [id number]   CEID: [id number] :    Subscriber ID:  Pt Rel to Subscriber:    Hospital Account Financial Class: Medicare Advantage    2021

## (undated) NOTE — ED AVS SNAPSHOT
Diann Jernigan   MRN: [id number]    Department:  [facility] Emergency Department   Date of Visit:  1/12/2020           Disclosure     Insurance plans vary and the physician(s) referred by the ER may not be covered by your plan.  Please contact your tell this physician (or your personal doctor if your instructions are to return to your personal doctor) about any new or lasting problems. The primary care or specialist physician will see patients referred from the [facility] Emergency Department.  Adalid Ospina

## (undated) NOTE — ED AVS SNAPSHOT
Sruthi Gibbons   MRN: [id number]    Department:  [facility] Emergency Department   Date of Visit:  5/28/2018           Disclosure     Insurance plans vary and the physician(s) referred by the ER may not be covered by your plan.  Please contact your tell this physician (or your personal doctor if your instructions are to return to your personal doctor) about any new or lasting problems. The primary care or specialist physician will see patients referred from the [facility] Emergency Department.  Severo Pastures

## (undated) NOTE — IP AVS SNAPSHOT
[facility] [place]  [address] [place], [address] ~ [phone]                Discharge Summary   1/3/2017    [address]           Admission Information        Provider Department    1/3/2017 Elizabeth Dunbar MD  [id number]-A Take 0.5 tablets (25 mg total) by mouth daily. Leah Trinh                           benzonatate 100 MG Caps   Commonly known as:  TESSALON        Take 1 capsule (100 mg total) by mouth 3 (three) times daily as needed for cough.     Mason CHAPA, D Commonly known as:  SYNTHROID, LEVOTHROID        Take 137 mcg by mouth once daily.     Venkatesh Mcduffie                           lisinopril 5 MG Tabs   Last time this was given:  5 mg on 1/7/2017 10:22 AM   Commonly known as:  MERY PRICESTRIL        Take 5 m Dani Genao                           Vitamin D 2000 UNITS Caps   Last time this was given:  4,000 Units on 1/7/2017 10:20 AM        Take 4,000 Units by mouth daily.                                  Where to Get Your Medications      These medications we MA Super Visit with Gonzalo Jones MD   [facility] [place] IM/IOP)    [facility]  [address] [place], [place] [address]            May 03, 2017  9:15 AM   EXAM-ESTABLISHED with BALAJI/DAVONTE PAEZ OPHTHALMOLOGY 0.70 (11/03/16)  7.3 (11/03/16)  3.6 (01/04/17)  138 (01/04/17)  3.8 (01/04/17)  100 (L)      Radiology Exams     None      Patient Belongings       Most Recent Value    All belongings returned to patient at discharge Pt's bedside belongings    Medications and/or abnormal heart rates/rhythms   Most common side effects: Dizziness or feeling lightheaded (especially with standing), heart rate changes, headaches, nausea/vomiting   What to report to your healthcare team:  Dizziness, nausea, chest pain, weakness, Most common side effects:  Depends on the specific medication but generally include: diarrhea, constipation, headache, allergic reaction (itching, rash, hives)   What to report to your healthcare team: Pain, nausea/vomiting, no bowel movement in 2+ days, d ClonazePAM 2 MG Oral Tab       Use:  Treat conditions such as seizures, headaches, depression, anxiety and other neurologic conditions   Most common side effects:  Dizziness, drowsiness, headache, nausea/vomiting, somnolence   What to report to your healt